# Patient Record
Sex: FEMALE | Race: WHITE | NOT HISPANIC OR LATINO | Employment: FULL TIME | URBAN - METROPOLITAN AREA
[De-identification: names, ages, dates, MRNs, and addresses within clinical notes are randomized per-mention and may not be internally consistent; named-entity substitution may affect disease eponyms.]

---

## 2017-02-01 ENCOUNTER — ALLSCRIPTS OFFICE VISIT (OUTPATIENT)
Dept: OTHER | Facility: OTHER | Age: 52
End: 2017-02-01

## 2017-02-01 LAB
BILIRUB UR QL STRIP: NEGATIVE
CLARITY UR: NORMAL
COLOR UR: YELLOW
GLUCOSE (HISTORICAL): NEGATIVE
HGB UR QL STRIP.AUTO: NEGATIVE
KETONES UR STRIP-MCNC: NORMAL MG/DL
LEUKOCYTE ESTERASE UR QL STRIP: NEGATIVE
NITRITE UR QL STRIP: NEGATIVE
PH UR STRIP.AUTO: 5.5 [PH]
PROT UR STRIP-MCNC: NEGATIVE MG/DL
SP GR UR STRIP.AUTO: 1.01
UROBILINOGEN UR QL STRIP.AUTO: 0.2

## 2017-02-04 LAB — CULTURE RESULT (HISTORICAL): NORMAL

## 2017-02-06 ENCOUNTER — GENERIC CONVERSION - ENCOUNTER (OUTPATIENT)
Dept: OTHER | Facility: OTHER | Age: 52
End: 2017-02-06

## 2017-04-10 ENCOUNTER — ALLSCRIPTS OFFICE VISIT (OUTPATIENT)
Dept: OTHER | Facility: OTHER | Age: 52
End: 2017-04-10

## 2018-01-10 NOTE — RESULT NOTES
Verified Results  (1) CBC/PLT/DIFF [id number] 05:20PM Bobbi Torres Order Number: [id number]     Test Name Result Flag Reference   WBC COUNT 6 20 Thousand/uL  4 31-10 16   RBC COUNT 4 44 Million/uL  3 81-5 12   HEMOGLOBIN 13 4 g/dL  11 5-15 4   HEMATOCRIT 38 9 %  34 8-46  1   MCV 88 fL  82-98   MCH 30 2 pg  26 8-34 3   MCHC 34 4 g/dL  31 4-37 4   RDW 13 7 %  11 6-15 1   MPV 12 0 fL  8 9-12 7   PLATELET COUNT 901 Thousands/uL  149-390   nRBC AUTOMATED 0 /100 WBCs     NEUTROPHILS RELATIVE PERCENT 52 %  43-75   LYMPHOCYTES RELATIVE PERCENT 38 %  14-44   MONOCYTES RELATIVE PERCENT 9 %  4-12   EOSINOPHILS RELATIVE PERCENT 1 %  0-6   BASOPHILS RELATIVE PERCENT 0 %  0-1   NEUTROPHILS ABSOLUTE COUNT 3 23 Thousands/?L  1 85-7 62   LYMPHOCYTES ABSOLUTE COUNT 2 34 Thousands/?L  0 60-4 47   MONOCYTES ABSOLUTE COUNT 0 55 Thousand/?L  0 17-1 22   EOSINOPHILS ABSOLUTE COUNT 0 06 Thousand/?L  0 00-0 61   BASOPHILS ABSOLUTE COUNT 0 02 Thousands/?L  0 00-0 10   - Patient Instructions: This bloodwork is non-fasting  Please drink two glasses of water morning of bloodwork  (1) COMPREHENSIVE METABOLIC PANEL [id number] 37:54CM Bobbi Torres Order Number: [id number]     Test Name Result Flag Reference   GLUCOSE,RANDM 88 mg/dL     If the patient is fasting, the ADA then defines impaired fasting glucose as > 100 mg/dL and diabetes as > or equal to 123 mg/dL     SODIUM 138 mmol/L  136-145   POTASSIUM 3 9 mmol/L  3 5-5 3   CHLORIDE 107 mmol/L  100-108   CARBON DIOXIDE 20 mmol/L L 21-32   ANION GAP (CALC) 11 mmol/L  4-13   BLOOD UREA NITROGEN 13 mg/dL  5-25   CREATININE 0 64 mg/dL  0 60-1 30   Standardized to IDMS reference method   CALCIUM 9 3 mg/dL  8 3-10 1   BILI, TOTAL 1 10 mg/dL H 0 20-1 00   ALK PHOSPHATAS 48 U/L     ALT (SGPT) 20 U/L  12-78   AST(SGOT) 15 U/L  5-45   ALBUMIN 4 1 g/dL  3 5-5 0   TOTAL PROTEIN 7 2 g/dL  6 4-8 2   eGFR Non-African American      >60 0 ml/min/1 73sq m National Kidney Disease Education Program recommendations are as follows:  GFR calculation is accurate only with a steady state creatinine  Chronic Kidney disease less than 60 ml/min/1 73 sq  meters  Kidney failure less than 15 ml/min/1 73 sq  meters

## 2018-01-12 NOTE — RESULT NOTES
Verified Results  (Q) CULTURE, URINE, SPECIAL [id number] 12:00AM Paolo Ayala     Test Name Result Flag Reference   CULTURE, URINE, SPECIAL      CULTURE, URINE, SPECIAL         MICRO NUMBER:      [id number]    TEST STATUS:       FINAL    SPECIMEN SOURCE:   URINE    SPECIMEN QUALITY:  ADEQUATE    RESULT:            No Growth

## 2018-01-13 VITALS
OXYGEN SATURATION: 98 % | DIASTOLIC BLOOD PRESSURE: 62 MMHG | BODY MASS INDEX: 32.09 KG/M2 | RESPIRATION RATE: 16 BRPM | HEART RATE: 79 BPM | TEMPERATURE: 98.1 F | WEIGHT: 178.31 LBS | SYSTOLIC BLOOD PRESSURE: 110 MMHG

## 2018-01-13 VITALS
SYSTOLIC BLOOD PRESSURE: 110 MMHG | TEMPERATURE: 98.8 F | HEART RATE: 67 BPM | BODY MASS INDEX: 32.39 KG/M2 | DIASTOLIC BLOOD PRESSURE: 70 MMHG | WEIGHT: 176 LBS | OXYGEN SATURATION: 99 % | HEIGHT: 62 IN

## 2018-03-08 ENCOUNTER — OFFICE VISIT (OUTPATIENT)
Dept: FAMILY MEDICINE CLINIC | Facility: CLINIC | Age: 53
End: 2018-03-08
Payer: COMMERCIAL

## 2018-03-08 VITALS
HEIGHT: 62 IN | TEMPERATURE: 98 F | WEIGHT: 176.5 LBS | SYSTOLIC BLOOD PRESSURE: 116 MMHG | BODY MASS INDEX: 32.48 KG/M2 | RESPIRATION RATE: 16 BRPM | DIASTOLIC BLOOD PRESSURE: 78 MMHG | OXYGEN SATURATION: 98 % | HEART RATE: 74 BPM

## 2018-03-08 DIAGNOSIS — R10.32 LEFT LOWER QUADRANT PAIN: Primary | ICD-10-CM

## 2018-03-08 DIAGNOSIS — H91.13 PRESBYCUSIS OF BOTH EARS: ICD-10-CM

## 2018-03-08 DIAGNOSIS — J32.9 CHRONIC SINUSITIS, UNSPECIFIED LOCATION: ICD-10-CM

## 2018-03-08 PROBLEM — F43.21 SITUATIONAL DEPRESSION: Status: ACTIVE | Noted: 2017-04-10

## 2018-03-08 PROBLEM — R30.0 DIFFICULT OR PAINFUL URINATION: Status: ACTIVE | Noted: 2017-02-01

## 2018-03-08 PROBLEM — F32.A DEPRESSION: Status: ACTIVE | Noted: 2017-10-25

## 2018-03-08 PROCEDURE — 99214 OFFICE O/P EST MOD 30 MIN: CPT | Performed by: FAMILY MEDICINE

## 2018-03-08 RX ORDER — EPINEPHRINE 0.3 MG/.3ML
INJECTION SUBCUTANEOUS
COMMUNITY
Start: 2014-07-11 | End: 2018-08-15 | Stop reason: SDUPTHER

## 2018-03-08 RX ORDER — SERTRALINE HYDROCHLORIDE 25 MG/1
50 TABLET, FILM COATED ORAL
COMMUNITY
Start: 2011-11-15 | End: 2018-10-25 | Stop reason: SDUPTHER

## 2018-03-08 RX ORDER — LORAZEPAM 0.5 MG/1
0.5 TABLET ORAL AS NEEDED
COMMUNITY
Start: 2015-05-19 | End: 2020-09-02 | Stop reason: SDUPTHER

## 2018-03-08 RX ORDER — TRIAMCINOLONE ACETONIDE 55 UG/1
SPRAY, METERED NASAL
COMMUNITY
Start: 2008-04-17 | End: 2018-03-08 | Stop reason: ALTCHOICE

## 2018-03-08 RX ORDER — MULTIVIT-MIN/IRON/FOLIC ACID/K 18-600-40
CAPSULE ORAL
COMMUNITY
End: 2019-04-29

## 2018-03-08 RX ORDER — SUMATRIPTAN 100 MG/1
1 TABLET, FILM COATED ORAL
COMMUNITY
Start: 2015-05-17 | End: 2018-03-25 | Stop reason: SDUPTHER

## 2018-03-08 RX ORDER — FLUTICASONE PROPIONATE 50 MCG
2 SPRAY, SUSPENSION (ML) NASAL DAILY
COMMUNITY
Start: 2011-10-25 | End: 2018-03-08 | Stop reason: SDUPTHER

## 2018-03-08 RX ORDER — FLUTICASONE PROPIONATE 50 MCG
2 SPRAY, SUSPENSION (ML) NASAL DAILY
Qty: 16 G | Refills: 0 | Status: SHIPPED | OUTPATIENT
Start: 2018-03-08 | End: 2018-03-25 | Stop reason: SDUPTHER

## 2018-03-08 NOTE — PROGRESS NOTES
Assessment/Plan:    No problem-specific Assessment & Plan notes found for this encounter  Diagnoses and all orders for this visit:    Left lower quadrant pain  Comments:  Check CBC and CMP  Check CT abdomen and pelvis with contrast  Orders:  -     CT abdomen pelvis w contrast; Future  -     CBC and differential; Future  -     Comprehensive metabolic panel; Future    Chronic sinusitis, unspecified location  -     fluticasone (FLONASE) 50 mcg/act nasal spray; 2 sprays into each nostril daily    Presbycusis of both ears  Comments:  Mild at this time  Consider hearing evaluation if symptoms get worse    Other orders  -     SUMAtriptan (IMITREX) 100 mg tablet; Take 1 tablet by mouth  -     sertraline (ZOLOFT) 25 mg tablet; Take 50 mg by mouth    -     LORazepam (ATIVAN) 0 5 mg tablet; Take by mouth  -     Discontinue: fluticasone (FLONASE) 50 mcg/act nasal spray; 2 sprays into each nostril daily  -     EPINEPHrine (EPIPEN) 0 3 mg/0 3 mL SOAJ; Inject as directed  -     Cholecalciferol (VITAMIN D) 2000 units CAPS; Take by mouth          Subjective:      Patient ID: Praveen Carroll is a 45 y o  female  Patient presents with 2 complaints  First her primary complaint is abdominal pain  She states that she has had intermittent recurrent left lower quadrant abdominal pain over the past several years  Her most recent bout was last night  She described it as severe at the time and she came close to going to the emergency room  The pain is somewhat improved and almost gone today  She has spoken to her gynecologist in the past about this and had an ultrasound approximately 2 years ago which was negative  She has a remote history of a uterine polyp which was removed during an ablation procedure  She has no change in urine or bowel habit  Her colonoscopy was 4 years ago and was normal except for several benign polyps  Also complains of decreased hearing bilaterally left worse than right    This has been gradually progressive        The following portions of the patient's history were reviewed and updated as appropriate: allergies, current medications, past family history, past medical history, past social history, past surgical history and problem list     Review of Systems   Constitutional: Negative  Respiratory: Negative  Cardiovascular: Negative  Gastrointestinal: Positive for abdominal pain  Genitourinary: Negative  Musculoskeletal: Negative  Psychiatric/Behavioral: Negative  Objective:      /78 (BP Location: Left arm, Patient Position: Sitting, Cuff Size: Large)   Pulse 74   Temp 98 °F (36 7 °C) (Tympanic)   Resp 16   Ht 5' 2 4" (1 585 m)   Wt 80 1 kg (176 lb 8 oz)   LMP 01/15/2018   SpO2 98%   BMI 31 87 kg/m²          Physical Exam   Constitutional: She is oriented to person, place, and time  She appears well-developed and well-nourished  No distress  HENT:   Head: Normocephalic and atraumatic  Eyes: Conjunctivae are normal  Pupils are equal, round, and reactive to light  Right eye exhibits no discharge  Neck: Normal range of motion  No thyromegaly present  Cardiovascular: Normal rate and regular rhythm  Pulmonary/Chest: Effort normal and breath sounds normal  No respiratory distress  Abdominal: Soft  Bowel sounds are normal  There is tenderness  Mild left lower quadrant tenderness with negative rebound negative guarding and negative rigidity   Lymphadenopathy:     She has no cervical adenopathy  Neurological: She is alert and oriented to person, place, and time  Skin: Skin is warm and dry  She is not diaphoretic  Psychiatric: She has a normal mood and affect  Her behavior is normal  Judgment and thought content normal    Nursing note and vitals reviewed

## 2018-03-09 LAB
ALBUMIN SERPL-MCNC: 4.2 G/DL (ref 3.6–5.1)
ALBUMIN/GLOB SERPL: 1.6 (CALC) (ref 1–2.5)
ALP SERPL-CCNC: 44 U/L (ref 33–130)
ALT SERPL-CCNC: 10 U/L (ref 6–29)
AST SERPL-CCNC: 13 U/L (ref 10–35)
BASOPHILS # BLD AUTO: 41 CELLS/UL (ref 0–200)
BASOPHILS NFR BLD AUTO: 0.7 %
BILIRUB SERPL-MCNC: 0.9 MG/DL (ref 0.2–1.2)
BUN SERPL-MCNC: 16 MG/DL (ref 7–25)
BUN/CREAT SERPL: ABNORMAL (CALC) (ref 6–22)
CALCIUM SERPL-MCNC: 9 MG/DL (ref 8.6–10.4)
CHLORIDE SERPL-SCNC: 108 MMOL/L (ref 98–110)
CO2 SERPL-SCNC: 19 MMOL/L (ref 20–31)
CREAT SERPL-MCNC: 0.6 MG/DL (ref 0.5–1.05)
EOSINOPHIL # BLD AUTO: 112 CELLS/UL (ref 15–500)
EOSINOPHIL NFR BLD AUTO: 1.9 %
ERYTHROCYTE [DISTWIDTH] IN BLOOD BY AUTOMATED COUNT: 13 % (ref 11–15)
GLOBULIN SER CALC-MCNC: 2.6 G/DL (CALC) (ref 1.9–3.7)
GLUCOSE SERPL-MCNC: 104 MG/DL (ref 65–99)
HCT VFR BLD AUTO: 38.2 % (ref 35–45)
HGB BLD-MCNC: 12.8 G/DL (ref 11.7–15.5)
LYMPHOCYTES # BLD AUTO: 2159 CELLS/UL (ref 850–3900)
LYMPHOCYTES NFR BLD AUTO: 36.6 %
MCH RBC QN AUTO: 30 PG (ref 27–33)
MCHC RBC AUTO-ENTMCNC: 33.5 G/DL (ref 32–36)
MCV RBC AUTO: 89.5 FL (ref 80–100)
MONOCYTES # BLD AUTO: 537 CELLS/UL (ref 200–950)
MONOCYTES NFR BLD AUTO: 9.1 %
NEUTROPHILS # BLD AUTO: 3050 CELLS/UL (ref 1500–7800)
NEUTROPHILS NFR BLD AUTO: 51.7 %
PLATELET # BLD AUTO: 262 THOUSAND/UL (ref 140–400)
PMV BLD REES-ECKER: 12.8 FL (ref 7.5–12.5)
POTASSIUM SERPL-SCNC: 3.9 MMOL/L (ref 3.5–5.3)
PROT SERPL-MCNC: 6.8 G/DL (ref 6.1–8.1)
RBC # BLD AUTO: 4.27 MILLION/UL (ref 3.8–5.1)
SL AMB EGFR AFRICAN AMERICAN: 120 ML/MIN/1.73M2
SL AMB EGFR NON AFRICAN AMERICAN: 103 ML/MIN/1.73M2
SODIUM SERPL-SCNC: 138 MMOL/L (ref 135–146)
WBC # BLD AUTO: 5.9 THOUSAND/UL (ref 3.8–10.8)

## 2018-03-14 ENCOUNTER — HOSPITAL ENCOUNTER (OUTPATIENT)
Dept: CT IMAGING | Facility: HOSPITAL | Age: 53
Discharge: HOME | End: 2018-03-14
Payer: COMMERCIAL

## 2018-03-14 DIAGNOSIS — R10.32 LEFT LOWER QUADRANT PAIN: ICD-10-CM

## 2018-03-14 PROCEDURE — 74177 CT ABD & PELVIS W/CONTRAST: CPT

## 2018-03-14 RX ADMIN — IOHEXOL 100 ML: 350 INJECTION, SOLUTION INTRAVENOUS at 19:37

## 2018-03-19 ENCOUNTER — TELEPHONE (OUTPATIENT)
Dept: FAMILY MEDICINE CLINIC | Facility: CLINIC | Age: 53
End: 2018-03-19

## 2018-03-19 NOTE — TELEPHONE ENCOUNTER
Pt called inquiring about her CT results pt was informed that they are still in  process and we will call her with her results once the come in

## 2018-03-25 DIAGNOSIS — G43.909 MIGRAINE WITHOUT STATUS MIGRAINOSUS, NOT INTRACTABLE, UNSPECIFIED MIGRAINE TYPE: Primary | ICD-10-CM

## 2018-03-25 DIAGNOSIS — J32.9 CHRONIC SINUSITIS, UNSPECIFIED LOCATION: ICD-10-CM

## 2018-03-26 RX ORDER — SUMATRIPTAN 100 MG/1
TABLET, FILM COATED ORAL
Qty: 27 TABLET | Refills: 1 | Status: SHIPPED | OUTPATIENT
Start: 2018-03-26 | End: 2021-11-26 | Stop reason: SDUPTHER

## 2018-03-26 RX ORDER — FLUTICASONE PROPIONATE 50 MCG
SPRAY, SUSPENSION (ML) NASAL
Qty: 16 G | Refills: 5 | Status: SHIPPED | OUTPATIENT
Start: 2018-03-26 | End: 2019-01-14 | Stop reason: SDUPTHER

## 2018-04-03 ENCOUNTER — OFFICE VISIT (OUTPATIENT)
Dept: FAMILY MEDICINE CLINIC | Facility: CLINIC | Age: 53
End: 2018-04-03
Payer: COMMERCIAL

## 2018-04-03 VITALS
SYSTOLIC BLOOD PRESSURE: 110 MMHG | OXYGEN SATURATION: 98 % | HEIGHT: 62 IN | RESPIRATION RATE: 16 BRPM | BODY MASS INDEX: 32.61 KG/M2 | WEIGHT: 177.2 LBS | DIASTOLIC BLOOD PRESSURE: 64 MMHG | HEART RATE: 74 BPM | TEMPERATURE: 98 F

## 2018-04-03 DIAGNOSIS — R10.9 CHRONIC ABDOMINAL PAIN: Primary | ICD-10-CM

## 2018-04-03 DIAGNOSIS — G89.29 CHRONIC ABDOMINAL PAIN: Primary | ICD-10-CM

## 2018-04-03 PROCEDURE — 99213 OFFICE O/P EST LOW 20 MIN: CPT | Performed by: FAMILY MEDICINE

## 2018-04-04 NOTE — ASSESSMENT & PLAN NOTE
Reviewed past history as well as most recent CT scan  No is evidence of any serious medical pathology  Discussed possibility of irritable bowel syndrome versus ectopic endometriosis  Reassured patient that in either of these 2 cases her health is not at risk and that further workup/diagnostics would be determined by her level of symptoms  At this time she would prefer to just watch her symptoms and call if are getting worse  In that case referral to Gastroenterology may be indicated

## 2018-04-04 NOTE — PROGRESS NOTES
Assessment/Plan:    Chronic abdominal pain  Reviewed past history as well as most recent CT scan  No is evidence of any serious medical pathology  Discussed possibility of irritable bowel syndrome versus ectopic endometriosis  Reassured patient that in either of these 2 cases her health is not at risk and that further workup/diagnostics would be determined by her level of symptoms  At this time she would prefer to just watch her symptoms and call if are getting worse  In that case referral to Gastroenterology may be indicated  Diagnoses and all orders for this visit:    Chronic abdominal pain          Subjective:      Patient ID: Kiko Martinez is a 45 y o  female  Patient return to the office to discuss her chronic abdominal pain and her recent CT scan  The CT scan was unremarkable showed no pathology  Reviewing her past history she has had multiple pelvic ultrasounds and a normal colonoscopy 4 years ago  The symptoms began prior to her colonoscopy  They are intermittent and generally tolerable  Recent episode which prompted her most recent visit was more severe  She has had no symptoms since her last office visit  The following portions of the patient's history were reviewed and updated as appropriate: allergies, current medications, past family history, past medical history, past social history, past surgical history and problem list     Review of Systems   Gastrointestinal: Positive for abdominal pain  Negative for constipation, diarrhea and nausea  Objective:      /64 (BP Location: Left arm, Patient Position: Sitting, Cuff Size: Adult)   Pulse 74   Temp 98 °F (36 7 °C) (Tympanic)   Resp 16   Ht 5' 1 5" (1 562 m)   Wt 80 4 kg (177 lb 3 2 oz)   SpO2 98%   BMI 32 94 kg/m²          Physical Exam   HENT:   Head: Normocephalic and atraumatic  Cardiovascular: Normal rate  Pulmonary/Chest: Effort normal and breath sounds normal    Abdominal: Soft   Bowel sounds are normal    Nursing note and vitals reviewed

## 2018-05-10 ENCOUNTER — TRANSCRIBE ORDERS (OUTPATIENT)
Dept: ADMINISTRATIVE | Facility: HOSPITAL | Age: 53
End: 2018-05-10

## 2018-05-10 DIAGNOSIS — R10.2 ADNEXAL TENDERNESS, RIGHT: Primary | ICD-10-CM

## 2018-05-10 LAB — MISCELLANEOUS LAB TEST RESULT (HISTORICAL): NORMAL

## 2018-05-10 PROCEDURE — 87491 CHLMYD TRACH DNA AMP PROBE: CPT | Performed by: OBSTETRICS & GYNECOLOGY

## 2018-05-10 PROCEDURE — 87591 N.GONORRHOEAE DNA AMP PROB: CPT | Performed by: OBSTETRICS & GYNECOLOGY

## 2018-05-11 LAB
CANDIDA ANTIGEN DETECTION (HISTORICAL): NORMAL
GARDNERELLA VAGINALIS (HISTORICAL): NORMAL
TRICHOMONAS (HISTORICAL): NORMAL

## 2018-05-12 ENCOUNTER — HOSPITAL ENCOUNTER (OUTPATIENT)
Dept: ULTRASOUND IMAGING | Facility: HOSPITAL | Age: 53
Discharge: HOME | End: 2018-05-12
Payer: COMMERCIAL

## 2018-05-12 DIAGNOSIS — R10.2 ADNEXAL TENDERNESS, RIGHT: ICD-10-CM

## 2018-05-12 PROCEDURE — 76830 TRANSVAGINAL US NON-OB: CPT

## 2018-05-12 PROCEDURE — 76856 US EXAM PELVIC COMPLETE: CPT

## 2018-05-15 ENCOUNTER — LAB REQUISITION (OUTPATIENT)
Dept: LAB | Facility: HOSPITAL | Age: 53
End: 2018-05-15
Payer: COMMERCIAL

## 2018-05-15 DIAGNOSIS — N72 INFLAMMATORY DISEASE OF UTERINE CERVIX: ICD-10-CM

## 2018-05-15 LAB — MISCELLANEOUS LAB TEST RESULT (HISTORICAL): NORMAL

## 2018-05-16 LAB
CHLAMYDIA DNA CVX QL NAA+PROBE: NORMAL
N GONORRHOEA DNA GENITAL QL NAA+PROBE: NORMAL

## 2018-08-15 ENCOUNTER — OFFICE VISIT (OUTPATIENT)
Dept: FAMILY MEDICINE CLINIC | Facility: CLINIC | Age: 53
End: 2018-08-15
Payer: COMMERCIAL

## 2018-08-15 ENCOUNTER — TRANSCRIBE ORDERS (OUTPATIENT)
Dept: ADMINISTRATIVE | Facility: HOSPITAL | Age: 53
End: 2018-08-15

## 2018-08-15 ENCOUNTER — APPOINTMENT (OUTPATIENT)
Dept: RADIOLOGY | Facility: MEDICAL CENTER | Age: 53
End: 2018-08-15
Payer: COMMERCIAL

## 2018-08-15 ENCOUNTER — TELEPHONE (OUTPATIENT)
Dept: FAMILY MEDICINE CLINIC | Facility: CLINIC | Age: 53
End: 2018-08-15

## 2018-08-15 VITALS
DIASTOLIC BLOOD PRESSURE: 80 MMHG | BODY MASS INDEX: 32.44 KG/M2 | OXYGEN SATURATION: 99 % | WEIGHT: 176.3 LBS | RESPIRATION RATE: 18 BRPM | HEIGHT: 62 IN | SYSTOLIC BLOOD PRESSURE: 130 MMHG | HEART RATE: 92 BPM | TEMPERATURE: 98.6 F

## 2018-08-15 DIAGNOSIS — T78.2XXD ANAPHYLAXIS, SUBSEQUENT ENCOUNTER: ICD-10-CM
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
## 2018-08-15 DIAGNOSIS — M54.42 LEFT-SIDED LOW BACK PAIN WITH LEFT-SIDED SCIATICA, UNSPECIFIED CHRONICITY: Primary | ICD-10-CM

## 2018-08-15 DIAGNOSIS — M54.42 LEFT-SIDED LOW BACK PAIN WITH LEFT-SIDED SCIATICA, UNSPECIFIED CHRONICITY: ICD-10-CM

## 2018-08-15 PROCEDURE — 72100 X-RAY EXAM L-S SPINE 2/3 VWS: CPT

## 2018-08-15 PROCEDURE — 99214 OFFICE O/P EST MOD 30 MIN: CPT | Performed by: PHYSICIAN ASSISTANT

## 2018-08-15 RX ORDER — CYCLOBENZAPRINE HCL 10 MG
10 TABLET ORAL
Qty: 30 TABLET | Refills: 0 | Status: SHIPPED | OUTPATIENT
Start: 2018-08-15 | End: 2018-10-25

## 2018-08-15 RX ORDER — EPINEPHRINE 0.3 MG/.3ML
0.3 INJECTION SUBCUTANEOUS ONCE
Qty: 2 EACH | Refills: 1 | Status: SHIPPED | OUTPATIENT
Start: 2018-08-15 | End: 2021-10-25

## 2018-08-15 RX ORDER — MELOXICAM 15 MG/1
15 TABLET ORAL DAILY
Qty: 30 TABLET | Refills: 0 | Status: SHIPPED | OUTPATIENT
Start: 2018-08-15 | End: 2018-10-25

## 2018-08-15 NOTE — TELEPHONE ENCOUNTER
Calling the pharmacy to verify all pt's rx's were received  I called and spoke directly to [facility]   gave a verbal for al l3 of pt's rx's and reviewed them as Stefan Emmanuel prescribed  If epi pen is not cover for insurance will  call to ask if can dispense generic  I called and informed pt I gave a verbal for all three of pt's rx's

## 2018-08-15 NOTE — PROGRESS NOTES
Assessment/Plan:      Diagnoses and all orders for this visit:    Left-sided low back pain with left-sided sciatica, unspecified chronicity  -     Cancel: XR spine lumbar 2 or 3 views injury; Future  -     meloxicam (MOBIC) 15 mg tablet; Take 1 tablet (15 mg total) by mouth daily For low back pain  -     Ambulatory referral to Physical Therapy; Future  -     XR spine lumbar 2 or 3 views injury; Future  -     cyclobenzaprine (FLEXERIL) 10 mg tablet; Take 1 tablet (10 mg total) by mouth daily at bedtime To relax back muscles    Fall from one level to another as cause of accidental injury  -     Ambulatory referral to Physical Therapy; Future  -     XR spine lumbar 2 or 3 views injury; Future  -     cyclobenzaprine (FLEXERIL) 10 mg tablet; Take 1 tablet (10 mg total) by mouth daily at bedtime To relax back muscles    Anaphylaxis, subsequent encounter  -     EPINEPHrine (EPIPEN) 0 3 mg/0 3 mL SOAJ; Inject 0 3 mL (0 3 mg total) into a muscle once for 1 dose        20-year-old female here today for new complaint of left-sided lower back pain radiating into her left leg for 3 weeks  She states that she actually fell while vacationing a few weeks ago accidentally falling into a beach chair and through the straps hitting her left lower back  She states that the pain persisted and actually worsened and now for the past 3 days is radiating into her left lower extremity  She has had no prior back issues  Due to injury itself I have advised that we send her for an x-ray of the lumbar spine to rule out acute injury  I will start her on meloxicam and cyclobenzaprine muscle relaxer  She will take meloxicam in the morning with food and cyclobenzaprine at night before bed with side effects discussed of each  I would like her to consider starting PT sooner than later and she will schedule  We will call her with x-ray results when available and follow up with her at that time        EpiPen also refilled at her  Request     Chief Complaint   Patient presents with    lower back pain     patient states that she felt when she was at the beach for vacation back on july the 22th       Subjective:     Patient ID: Shayy Ford is a 52 y o  female     54y/o female here today for left low back pain going into left leg x 2-3 weeks  States pain started when she fell through beach chair hitting her back  Since then pain persists, just got back yesterday from her vacation, had 8 hour car ride  No hx of back issues  Starts in left low back, goes into left lateral hip into left anterior thigh down into toes  Some tingling in toes when sitting a while  She put heating pad on once  Advil intermittent  Shooting pain into leg start 3 days  She also needs epi pen refilled - bee sting anaphylaxis  Review of Systems   Constitutional: Negative  Gastrointestinal: Negative  Genitourinary: Negative  Musculoskeletal:        As in HPI   Skin: Negative  Neurological:        As in HPI         The following portions of the patient's history were reviewed and updated as appropriate: allergies, current medications, past family history, past medical history, past social history, past surgical history and problem list       Objective:     Physical Exam   Constitutional: She is oriented to person, place, and time  She appears well-developed and well-nourished  No distress  Musculoskeletal:     Gait normal     Spine and lower extremities appear normal to inspection without redness, swelling, ecchymosis or rash  There is some tenderness to palpation along the left lower lumbar spine with some hypertonicity into the upper buttock region  No hip or lower extremity tenderness  Full range of motion of hip on left as well as her cervical spine  Forward flexion provokes most of the spinal pain otherwise relatively normal range of motion of lumbar spine in all directions  Strength of lower extremities is intact and symmetric     Neurological: She is alert and oriented to person, place, and time  No sensory deficit  Coordination and gait normal    Reflex Scores:       Patellar reflexes are 1+ on the right side and 1+ on the left side  Skin: Skin is intact  Psychiatric: She has a normal mood and affect  Vitals reviewed        Vitals:    08/15/18 1056   BP: 130/80   BP Location: Left arm   Patient Position: Sitting   Cuff Size: Large   Pulse: 92   Resp: 18   Temp: 98 6 °F (37 °C)   TempSrc: Tympanic   SpO2: 99%   Weight: 80 kg (176 lb 4 8 oz)   Height: 5' 2 21" (1 58 m)

## 2018-08-15 NOTE — TELEPHONE ENCOUNTER
Pt left a message at 2:07 pm on the message line  She was at the pharmacy and her rx 's were not yet received  It looks like they are still transmittign messaged LK

## 2018-08-18 ENCOUNTER — TELEPHONE (OUTPATIENT)
Dept: FAMILY MEDICINE CLINIC | Facility: CLINIC | Age: 53
End: 2018-08-18

## 2018-08-18 NOTE — TELEPHONE ENCOUNTER
Patient called back, Details given about xray  I closed task by mistake  She states that she is still having some pain but it is not as bad with the medication  Patient also states that she starts physical therapy on Wednesday the 22nd

## 2018-08-22 ENCOUNTER — EVALUATION (OUTPATIENT)
Dept: PHYSICAL THERAPY | Facility: CLINIC | Age: 53
End: 2018-08-22
Payer: COMMERCIAL
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
## 2018-08-22 DIAGNOSIS — M54.42 LEFT-SIDED LOW BACK PAIN WITH LEFT-SIDED SCIATICA, UNSPECIFIED CHRONICITY: Primary | ICD-10-CM

## 2018-08-22 PROCEDURE — G8991 OTHER PT/OT GOAL STATUS: HCPCS

## 2018-08-22 PROCEDURE — G8990 OTHER PT/OT CURRENT STATUS: HCPCS

## 2018-08-22 PROCEDURE — 97161 PT EVAL LOW COMPLEX 20 MIN: CPT

## 2018-08-22 NOTE — PROGRESS NOTES
PT Evaluation     Today's date: 2018  Patient name: Flora Sosa  : 1965  MRN: [id number]  Referring provider: Jay Gowers  Dx:   Encounter Diagnosis     ICD-10-CM    1  Left-sided low back pain with left-sided sciatica, unspecified chronicity M54 42 Ambulatory referral to Physical Therapy   2  Fall from one level to another as cause of accidental injury [address] Ambulatory referral to Physical Therapy                  ADDENDUM: Patient called to cancel remaining appointments, stating she is feeling better and would like to continue with HEP  Discharge from PT at this time  -AN, PT 18  Assessment  Impairments: abnormal or restricted ROM, activity intolerance, impaired physical strength, lacks appropriate home exercise program and pain with function  Functional limitations: pain with walking community distances and sitting for long periods of time, difficulty/pain with sleeping  Assessment details: Flora Sosa is a 52 y o  female presenting to PT with pain, radicular symptoms, decreased hip range of motion, decreased strength, and decreased tolerance to activity  Patient would benefit from skilled PT services to address these impairments and to maximize function in order to improve quality of life  Thank you for the referral   Understanding of Dx/Px/POC: good   Prognosis: good    Goals  STG  1) B/L hip ROM will improve 50% in 4 weeks  2) B/L hip strength will improve 1/2 grade in 4 weeks  LTG  1) B/L hip strength will improve to 5/5 in 8 weeks  2) Lumbar and hip ROM will improve to WNL in 8 weeks  3) Patient will not have radicular symptoms in 8 weeks  4) Patient will be independent in HEP within 8 weeks      Plan  Patient would benefit from: skilled physical therapy  Planned modality interventions: TENS and thermotherapy: hydrocollator packs  Planned therapy interventions: abdominal trunk stabilization, home exercise program, flexibility, functional ROM exercises, therapeutic exercise, therapeutic activities, stretching, strengthening, patient education, neuromuscular re-education, manual therapy, joint mobilization and postural training  Frequency: 2x week  Duration in weeks: 8  Treatment plan discussed with: patient        Subjective Evaluation    History of Present Illness  Date of onset: 2018  Mechanism of injury: Patient presents with c/o LBP which began a few weeks ago when she was attempting to sit down into a low beach chair, and missed the chair, landing on the metal part of the seat  Last week she went to [place] (8 hour drive), and when she woke up the following day she had a shooting pain down her LLE, and was unable to bear weight on that side  She has been taking a muscle relaxant at night and an anti-inflammatory medication, which seem to help the pain  This morning she states was the first time she was able to get up out of bed without having to stretch and felt fine walking around right away  She has a desk job, and has a [place], so she alternates between standing and sitting, however since this pain began she has spent less time standing as it is more painful  Quality of life: good    Pain  Current pain ratin  At best pain ratin  At worst pain rating: 10  Quality: needle-like, radiating and dull ache  Relieving factors: medications, change in position, heat and support  Aggravating factors: sitting, standing and walking  Progression: no change      Diagnostic Tests  Abnormal x-ray: DJD L4-L5, L5-S1    Treatments  Current treatment: medication  Patient Goals  Patient goals for therapy: decreased pain, increased motion, return to sport/leisure activities and increased strength          Objective     Special Questions  Negative for bladder dysfunction, bowel dysfunction and saddle (S4) numbness    Postural Observations  Seated posture: fair  Standing posture: fair  Correction of posture: makes symptoms better        Palpation   Left   No palpable tenderness to the erector spinae, lumbar paraspinals and quadratus lumborum  Right   No palpable tenderness to the erector spinae, lumbar paraspinals and quadratus lumborum  Tenderness     Left Hip   No tenderness in the PSIS  Right Hip   No tenderness in the PSIS  Neurological Testing     Additional Neurological Details  Normal LE neuro screen    Active Range of Motion     Lumbar   Normal active range of motion    Additional Active Range of Motion Details  Increased localized discomfort with repeated extension    Strength/Myotome Testing     Left Hip   Planes of Motion   Flexion: 4-  Extension: 4  Abduction: 4-  Adduction: 4    Right Hip   Planes of Motion   Flexion: 4+  Extension: 4  Abduction: 4  Adduction: 4    Left Knee   Flexion: 4+  Extension: 4+    Right Knee   Flexion: 4+  Extension: 4+    Left Ankle/Foot   Dorsiflexion: 4+  Plantar flexion: 4+    Right Ankle/Foot   Dorsiflexion: 4+  Plantar flexion: 4+    Muscle Activation     Additional Muscle Activation Details  Good TA activation    Tests       Thoracic   Positive slump  Lumbar   Positive prone instability  and slumped  Negative repeated flexion and repeated extension  Left   Positive crossed SLR  Negative passive SLR         Flowsheet Rows      Most Recent Value   PT/OT G-Codes   Current Score  52   Projected Score  69   FOTO information reviewed  Yes   Assessment Type  Evaluation   G code set  Other PT/OT Primary   Other PT Primary Current Status ()  CK   Other PT Primary Goal Status ()  CJ        Precautions: Iodine allergy, bee allergy    Daily Treatment Diary       Manuals             B/L piriformis stretch             L hip distraction                                       Exercise Diary              Cardio warmup             LB stretch pball rollout             HS stretch             Piriformis stretch             LTR                          SKTC             PPT             PPT w/ march             Supine ball crush             Glute bridges             Clam shells             Supine SLR                          Mini squats                                                                                                        Modalities             MHP prn

## 2018-08-29 ENCOUNTER — APPOINTMENT (OUTPATIENT)
Dept: PHYSICAL THERAPY | Facility: CLINIC | Age: 53
End: 2018-08-29
Payer: COMMERCIAL

## 2018-08-30 ENCOUNTER — APPOINTMENT (OUTPATIENT)
Dept: PHYSICAL THERAPY | Facility: CLINIC | Age: 53
End: 2018-08-30
Payer: COMMERCIAL

## 2018-09-18 ENCOUNTER — TRANSCRIBE ORDERS (OUTPATIENT)
Dept: ADMINISTRATIVE | Facility: HOSPITAL | Age: 53
End: 2018-09-18

## 2018-09-18 DIAGNOSIS — R10.2 PELVIC PAIN: Primary | ICD-10-CM

## 2018-09-19 ENCOUNTER — HOSPITAL ENCOUNTER (OUTPATIENT)
Dept: ULTRASOUND IMAGING | Facility: HOSPITAL | Age: 53
Discharge: HOME | End: 2018-09-19
Payer: COMMERCIAL

## 2018-09-19 DIAGNOSIS — R10.2 PELVIC PAIN: ICD-10-CM

## 2018-09-19 PROCEDURE — 76856 US EXAM PELVIC COMPLETE: CPT

## 2018-09-19 PROCEDURE — 76830 TRANSVAGINAL US NON-OB: CPT

## 2018-09-20 ENCOUNTER — TELEPHONE (OUTPATIENT)
Dept: GYNECOLOGY | Facility: CLINIC | Age: 53
End: 2018-09-20

## 2018-09-20 DIAGNOSIS — F32.9 REACTIVE DEPRESSION: Primary | ICD-10-CM

## 2018-09-20 RX ORDER — SERTRALINE HYDROCHLORIDE 25 MG/1
25 TABLET, FILM COATED ORAL DAILY
Qty: 90 TABLET | Refills: 0 | Status: SHIPPED | OUTPATIENT
Start: 2018-09-20 | End: 2018-10-25

## 2018-09-20 RX ORDER — SERTRALINE HYDROCHLORIDE 25 MG/1
25 TABLET, FILM COATED ORAL DAILY
Qty: 30 TABLET | Refills: 0 | Status: SHIPPED | OUTPATIENT
Start: 2018-09-20 | End: 2018-10-25

## 2018-09-20 NOTE — TELEPHONE ENCOUNTER
Tory Marshall calling to request a RF of Zoloft  Dr Andreina Valencia was the original prescribing provider  Pt is out of medication and [facility]  I advised pt to also reach out to her PCP in case Dr Cathy Contreras is not comfortable filling this medication

## 2018-10-19 ENCOUNTER — OFFICE VISIT (OUTPATIENT)
Dept: FAMILY MEDICINE CLINIC | Facility: CLINIC | Age: 53
End: 2018-10-19
Payer: COMMERCIAL

## 2018-10-19 VITALS
BODY MASS INDEX: 32.85 KG/M2 | HEIGHT: 62 IN | DIASTOLIC BLOOD PRESSURE: 92 MMHG | RESPIRATION RATE: 17 BRPM | HEART RATE: 73 BPM | WEIGHT: 178.5 LBS | OXYGEN SATURATION: 98 % | TEMPERATURE: 98.8 F | SYSTOLIC BLOOD PRESSURE: 134 MMHG

## 2018-10-19 DIAGNOSIS — T78.40XA ACUTE ALLERGIC REACTION, INITIAL ENCOUNTER: Primary | ICD-10-CM

## 2018-10-19 PROCEDURE — 3008F BODY MASS INDEX DOCD: CPT | Performed by: PHYSICIAN ASSISTANT

## 2018-10-19 PROCEDURE — 96372 THER/PROPH/DIAG INJ SC/IM: CPT | Performed by: PHYSICIAN ASSISTANT

## 2018-10-19 PROCEDURE — 99214 OFFICE O/P EST MOD 30 MIN: CPT | Performed by: PHYSICIAN ASSISTANT

## 2018-10-19 RX ORDER — DIPHENHYDRAMINE HCL 25 MG
25 CAPSULE ORAL EVERY 6 HOURS
COMMUNITY
Start: 2018-10-18 | End: 2019-02-28

## 2018-10-19 RX ORDER — TRIAMCINOLONE ACETONIDE 40 MG/ML
60 INJECTION, SUSPENSION INTRA-ARTICULAR; INTRAMUSCULAR ONCE
Status: COMPLETED | OUTPATIENT
Start: 2018-10-19 | End: 2018-10-19

## 2018-10-19 RX ORDER — PREDNISONE 20 MG/1
TABLET ORAL
Qty: 18 TABLET | Refills: 0 | Status: SHIPPED | OUTPATIENT
Start: 2018-10-19 | End: 2019-02-28

## 2018-10-19 RX ADMIN — TRIAMCINOLONE ACETONIDE 60 MG: 40 INJECTION, SUSPENSION INTRA-ARTICULAR; INTRAMUSCULAR at 17:21

## 2018-10-19 NOTE — PROGRESS NOTES
Assessment/Plan:      Diagnoses and all orders for this visit:    Acute allergic reaction, initial encounter  -     predniSONE 20 mg tablet; Take 3 tabs PO daily x 3 days, then 2 tabs PO daily x 3 days, then 1 tab PO daily x 3 days  -     triamcinolone acetonide (KENALOG-40) 40 mg/mL injection 60 mg; Inject 1 5 mL (60 mg total) into a muscle once     Other orders  -     diphenhydrAMINE (BENADRYL) 25 mg capsule; Take 25 mg by mouth every 6 (six) hours        Patient is a 57-year-old female presenting today for ER follow-up to allergic reaction to the flu vaccine she received through her employer yesterday  She states after receiving immunization, about 30-45 minutes later she started feeling heaviness in her chest, lip swelling and feeling like her throat was closing  She also started with a blotchy rash on bilateral arms  She was given oral Benadryl at [address] then EpiPen but then was taken by ambulance to [facility] for further evaluation when she did not respond  They gave her IV Benadryl which her symptoms slowly reversed  She has been taking oral Benadryl x2 since being discharged yesterday and states that she is starting to feel little tight in her chest and her lip still feel a little swollen  Her exam today is unremarkable, airway patent and oxygen level normal   However with patient's symptoms I will have her complete a 9 day prednisone taper to ensure the reaction   Resolves  Patient was advised to call or follow up with any concerns, ER precautions were discussed should symptoms continue to get worse and is non responsive to the prednisone  She also can continue Benadryl at night as I explained to patient potential side effects of prednisone as it may cause jitteriness or insomnia  She also was advised to take it with food  She will start this tomorrow, intramuscular Kenalog was administered today 60 mg with patient's verbal permission      I discussed case with Dr Kyle Gonzalez As it is difficult to tell what exactly she had the reaction to  She received Fluzone and she states that she has received the flu vaccine in years past as well without any issues  Dr Kiki Shen  Had suggested I consider reaching out to infectious Disease to determine how to assess this reaction and if she can have any other type of flu immunization in the future  I told patient once I hear something back from Infectious Disease I will let her know  Chief Complaint   Patient presents with    Follow-up     Er follow from alergic reaction from flu shot, heavyness in chest, lips and face swell       Subjective:     Patient ID: Omar Ward is a 52 y o  female     54y/o female here today for f/u to ER visit for allergic reaction to flu vaccine  She received fluzone through her employer at [facility] [employer] [facility] 57 and about a half hour later started with chest tightness, then progressed to feeling like someone choking her, lips swelling  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 and at that time started with hives, was given 25mg benadryl and then epi pen  Ambulance took her to ER since not responding  Given IV benadryl  D/c improvement  Had taken benadryl orallly x 2 since yesterday episode  States started feeling like someone choking her at 2pm today  Sick last night vomiting and diarrhea this AM      No nausea currently  Still some diarrhea  She does feel some lip swelling and some chest tightness  She states she had flu vaccine in past and was fine, never had reaction  Review of Systems   Constitutional: Negative  Respiratory:        As in HPI   Gastrointestinal:        As in HPI   Skin:        As in HPI   Neurological: Negative            The following portions of the patient's history were reviewed and updated as appropriate: allergies, current medications, past family history, past medical history, past social history, past surgical history and problem list       Objective:     Physical Exam Constitutional: She is oriented to person, place, and time  She appears well-developed and well-nourished  HENT:   Head: Normocephalic  Mouth/Throat: Oropharynx is clear and moist and mucous membranes are normal  No oral lesions  No uvula swelling  No posterior oropharyngeal edema or posterior oropharyngeal erythema  Airway patent  No tongue swelling   Neck: Trachea normal and phonation normal  Neck supple  Cardiovascular: Normal rate, regular rhythm and normal heart sounds  Pulmonary/Chest: Effort normal and breath sounds normal    Lymphadenopathy:     She has no cervical adenopathy  Neurological: She is alert and oriented to person, place, and time  Skin: Skin is intact  No rash noted  Psychiatric: She has a normal mood and affect  Vitals reviewed        Vitals:    10/19/18 1625   BP: 134/92   BP Location: Left arm   Patient Position: Sitting   Cuff Size: Adult   Pulse: 73   Resp: 17   Temp: 98 8 °F (37 1 °C)   TempSrc: Tympanic   SpO2: 98%   Weight: 81 kg (178 lb 8 oz)   Height: 5' 2" (1 575 m)

## 2018-10-20 ENCOUNTER — TELEPHONE (OUTPATIENT)
Dept: FAMILY MEDICINE CLINIC | Facility: CLINIC | Age: 53
End: 2018-10-20

## 2018-10-20 NOTE — TELEPHONE ENCOUNTER
Pt was here yesterday and put on pred, can she take ibuprofen or imitrex with it for a headache   Please call back at [phone]

## 2018-10-25 ENCOUNTER — OFFICE VISIT (OUTPATIENT)
Dept: GYNECOLOGY | Facility: CLINIC | Age: 53
End: 2018-10-25
Payer: COMMERCIAL

## 2018-10-25 VITALS
WEIGHT: 180 LBS | HEART RATE: 80 BPM | BODY MASS INDEX: 33.13 KG/M2 | SYSTOLIC BLOOD PRESSURE: 120 MMHG | HEIGHT: 62 IN | RESPIRATION RATE: 15 BRPM | DIASTOLIC BLOOD PRESSURE: 84 MMHG

## 2018-10-25 DIAGNOSIS — Z11.51 SCREENING FOR HUMAN PAPILLOMAVIRUS: ICD-10-CM

## 2018-10-25 DIAGNOSIS — N92.6 IRREGULAR PERIODS: ICD-10-CM

## 2018-10-25 DIAGNOSIS — Z12.31 ENCOUNTER FOR SCREENING MAMMOGRAM FOR MALIGNANT NEOPLASM OF BREAST: ICD-10-CM

## 2018-10-25 DIAGNOSIS — F32.9 REACTIVE DEPRESSION: ICD-10-CM

## 2018-10-25 DIAGNOSIS — Z01.419 ENCOUNTER FOR ANNUAL ROUTINE GYNECOLOGICAL EXAMINATION: Primary | ICD-10-CM

## 2018-10-25 PROCEDURE — 99396 PREV VISIT EST AGE 40-64: CPT | Performed by: OBSTETRICS & GYNECOLOGY

## 2018-10-25 PROCEDURE — 87624 HPV HI-RISK TYP POOLED RSLT: CPT | Performed by: OBSTETRICS & GYNECOLOGY

## 2018-10-25 PROCEDURE — G0145 SCR C/V CYTO,THINLAYER,RESCR: HCPCS | Performed by: OBSTETRICS & GYNECOLOGY

## 2018-10-25 RX ORDER — SERTRALINE HYDROCHLORIDE 25 MG/1
25 TABLET, FILM COATED ORAL 2 TIMES DAILY
Qty: 180 TABLET | Refills: 4 | Status: SHIPPED | OUTPATIENT
Start: 2018-10-25 | End: 2019-02-05 | Stop reason: SDUPTHER

## 2018-10-29 ENCOUNTER — TELEPHONE (OUTPATIENT)
Dept: FAMILY MEDICINE CLINIC | Facility: CLINIC | Age: 53
End: 2018-10-29

## 2018-10-29 DIAGNOSIS — Z88.7 ALLERGY TO INFLUENZA VACCINE: Primary | ICD-10-CM

## 2018-10-29 LAB
HPV HR 12 DNA CVX QL NAA+PROBE: POSITIVE
HPV16 DNA CVX QL NAA+PROBE: NEGATIVE
HPV18 DNA CVX QL NAA+PROBE: NEGATIVE

## 2018-10-29 NOTE — TELEPHONE ENCOUNTER
I called and spoke to Yoanna Jameson she was given Sandhya's message  She is feeling much better yesterday was her last days of the steroids so she did finish them  Yes, She will call her family allergist whom her daughter see's  I  Did advise pt I would ask if you can please enter the physician referral  Pt stated the doctor is off of [place] the number is [phone] so we can fax it to their office so they are aware you referred them to their office

## 2018-10-29 NOTE — PROGRESS NOTES
Assessment/Plan:    1  Annual GYN exam    2  Perimenopause -- pt will call if she has any IMB  She will have a f/u u/f and return to the office for review of menstrual calendar in 6 months  3   Mild Stress incontinence -- not bad enough to treat  Diagnoses and all orders for this visit:    Encounter for annual routine gynecological examination  -     Liquid-based pap, screening  -     HPV High Risk; Future  -     HPV High Risk    Screening for human papillomavirus  -     Liquid-based pap, screening  -     HPV High Risk; Future  -     HPV High Risk    Reactive depression  -     sertraline (ZOLOFT) 25 mg tablet; Take 1 tablet (25 mg total) by mouth 2 (two) times a day    Encounter for screening mammogram for malignant neoplasm of breast  -     Mammo screening bilateral w 3d & cad; Future    Irregular periods  -     US pelvis complete w transvaginal; Future          Subjective:      Patient ID: Jessika Ivan is a 52 y o  female  The patient is a 52-year-old who presents for an annual exam   She brought in her menstrual calendar for review  In the past year her periods have varied from 28-89 days in length  They are averaging around 45 to 50 days  The periods are light  She had an ultrasound of the pelvis last month which showed an endometrial thickness of 3 mm  She reports that she has night sweats that occur just before her period  She has no bleeding in between the cycles  The night sweats are tolerable  We discussed hormone replacement in detail  If her symptoms become worse, she will consider this  She denies any breast problems  She has some mild stress incontinence when she does jumping jacks  She does not feel these are bad enough to treat  She does not wear a pad  The patient denies any vaginal dryness or dyspareunia  The patient has been on Zoloft and would like to continue  She has been taking 25 mg b i d           The following portions of the patient's history were reviewed and updated as appropriate: allergies, current medications, past family history, past medical history, past social history, past surgical history and problem list     Review of Systems   Constitutional: Negative  Respiratory: Negative for shortness of breath  Cardiovascular: Negative for chest pain  Gastrointestinal: Negative  Genitourinary: Negative  Skin: Negative  Psychiatric/Behavioral: Negative for agitation, behavioral problems and confusion  Objective:      /84 (Cuff Size: Adult)   Pulse 80   Resp 15   Ht 5' 2" (1 575 m)   Wt 81 6 kg (180 lb)   LMP 09/28/2018 Comment: irregular  BMI 32 92 kg/m²          Physical Exam   Constitutional: She appears well-developed and well-nourished  No distress  HENT:   Head: Normocephalic  Pulmonary/Chest: Effort normal  No respiratory distress  Abdominal: She exhibits no distension and no mass  There is no tenderness  There is no rebound and no guarding  Genitourinary: Rectum normal and uterus normal  No breast swelling, tenderness, discharge or bleeding  No labial fusion  There is no rash, tenderness, lesion or injury on the right labia  There is no rash, tenderness, lesion or injury on the left labia  Uterus is not deviated, not enlarged, not fixed and not tender  Cervix exhibits no motion tenderness, no discharge and no friability  Right adnexum displays no mass, no tenderness and no fullness  Left adnexum displays no mass, no tenderness and no fullness  No erythema, tenderness or bleeding in the vagina  No foreign body in the vagina  No signs of injury around the vagina  No vaginal discharge found  Lymphadenopathy:        Right axillary: No pectoral and no lateral adenopathy present  Left axillary: No pectoral and no lateral adenopathy present  Skin: Skin is warm, dry and intact  She is not diaphoretic  No cyanosis  Nails show no clubbing  Psychiatric: She has a normal mood and affect   Her speech is normal and behavior is normal

## 2018-10-30 NOTE — TELEPHONE ENCOUNTER
I did fax [facility] doctor referral to allergist  Michael Kidd  Today 10/30/2018 at 8:55am to their number [phone]

## 2018-10-31 ENCOUNTER — TELEPHONE (OUTPATIENT)
Dept: FAMILY MEDICINE CLINIC | Facility: CLINIC | Age: 53
End: 2018-10-31

## 2018-10-31 NOTE — TELEPHONE ENCOUNTER
Pt called into the office, and stated that She had reaction to the flu shot, and today she came home having flu sxs with vomiting, and migraine headache pt did state its 2 weeks since she had the flu shot  Please advise  Thank you!

## 2018-10-31 NOTE — TELEPHONE ENCOUNTER
Vomiting is not a flu-like symptom  Is she having a high fever above 101? Severe body aches, fatigue? Runny nose, sneezing, sore throat, cough? If she has concerns she should be evaluated

## 2018-11-01 LAB
LAB AP GYN PRIMARY INTERPRETATION: NORMAL
Lab: NORMAL

## 2018-11-01 NOTE — TELEPHONE ENCOUNTER
I did offer her an appt, that was my first advice with her, she stated she didn't wanted to be seen

## 2018-11-01 NOTE — TELEPHONE ENCOUNTER
Called pt chavo, asking her if she had any other sxs besides her migraine headaches, and vomiting  Thank you!

## 2018-11-01 NOTE — TELEPHONE ENCOUNTER
Pt  called back here symptoms are the following:  Fever 99 9 , vomiting , nausea, sore throat, runny nose, congested

## 2018-11-01 NOTE — TELEPHONE ENCOUNTER
Spoke with pt  Sounds viral  Discussed her questions and concerns  Will see how she does over next few days with rest, fluids, BRAT, antihistamine, mucinex  I told her to f/u with me in office or call with concerns or persistent sxs

## 2018-11-01 NOTE — TELEPHONE ENCOUNTER
Ok, difficult to tell with sxs if it is viral, bacterial, strep, etc? If pt would like, please offer her an appt for evaluation if pt concerned  Typically, influenza is a very high fever 102-103, severe body aches and fatigue too   Nausea/vomiting not typically sxs of influenza

## 2019-01-14 DIAGNOSIS — J32.9 CHRONIC SINUSITIS, UNSPECIFIED LOCATION: ICD-10-CM

## 2019-01-15 RX ORDER — FLUTICASONE PROPIONATE 50 MCG
SPRAY, SUSPENSION (ML) NASAL
Qty: 16 G | Refills: 5 | Status: SHIPPED | OUTPATIENT
Start: 2019-01-15 | End: 2021-01-19

## 2019-02-04 DIAGNOSIS — F32.9 REACTIVE DEPRESSION: ICD-10-CM

## 2019-02-04 RX ORDER — SERTRALINE HYDROCHLORIDE 25 MG/1
25 TABLET, FILM COATED ORAL 2 TIMES DAILY
Qty: 180 TABLET | Refills: 0 | Status: CANCELLED | OUTPATIENT
Start: 2019-02-04

## 2019-02-04 NOTE — TELEPHONE ENCOUNTER
Patient called and requested a medication refill for sertraline (ZOLOFT) 25 mg tablet-take 1 tablet (25 mg) by mouth 2 times a day #180    She would like this called into [facility]

## 2019-02-05 NOTE — TELEPHONE ENCOUNTER
From: Gabe Pedro  Sent: 2/4/2019 8:47 PM EST  Subject: Medication Renewal Request    Manisha Velazquez would like a refill of the following medications:     sertraline (ZOLOFT) 25 mg tablet Delia Esposito MD]    [facility]    Comment:

## 2019-02-06 RX ORDER — SERTRALINE HYDROCHLORIDE 25 MG/1
25 TABLET, FILM COATED ORAL 2 TIMES DAILY
Qty: 180 TABLET | Refills: 4 | Status: SHIPPED | OUTPATIENT
Start: 2019-02-06 | End: 2020-08-24 | Stop reason: SDUPTHER

## 2019-02-06 RX ORDER — SERTRALINE HYDROCHLORIDE 25 MG/1
25 TABLET, FILM COATED ORAL 2 TIMES DAILY
Qty: 180 TABLET | Refills: 4 | Status: SHIPPED | OUTPATIENT
Start: 2019-02-06 | End: 2019-02-28

## 2019-02-28 ENCOUNTER — OFFICE VISIT (OUTPATIENT)
Dept: FAMILY MEDICINE CLINIC | Facility: CLINIC | Age: 54
End: 2019-02-28
Payer: COMMERCIAL

## 2019-02-28 VITALS
SYSTOLIC BLOOD PRESSURE: 120 MMHG | BODY MASS INDEX: 33.86 KG/M2 | DIASTOLIC BLOOD PRESSURE: 80 MMHG | TEMPERATURE: 98.3 F | OXYGEN SATURATION: 98 % | HEIGHT: 62 IN | HEART RATE: 72 BPM | RESPIRATION RATE: 16 BRPM | WEIGHT: 184 LBS

## 2019-02-28 DIAGNOSIS — J20.9 ACUTE BRONCHITIS, UNSPECIFIED ORGANISM: ICD-10-CM

## 2019-02-28 DIAGNOSIS — J01.90 ACUTE NON-RECURRENT SINUSITIS, UNSPECIFIED LOCATION: Primary | ICD-10-CM

## 2019-02-28 DIAGNOSIS — R63.5 WEIGHT GAIN: ICD-10-CM

## 2019-02-28 DIAGNOSIS — E55.9 VITAMIN D DEFICIENCY: ICD-10-CM

## 2019-02-28 DIAGNOSIS — E66.9 OBESITY (BMI 30.0-34.9): ICD-10-CM

## 2019-02-28 DIAGNOSIS — R53.83 FATIGUE, UNSPECIFIED TYPE: ICD-10-CM

## 2019-02-28 PROBLEM — R30.0 DIFFICULT OR PAINFUL URINATION: Status: RESOLVED | Noted: 2017-02-01 | Resolved: 2019-02-28

## 2019-02-28 PROBLEM — F43.21 SITUATIONAL DEPRESSION: Status: RESOLVED | Noted: 2017-04-10 | Resolved: 2019-02-28

## 2019-02-28 PROCEDURE — 3008F BODY MASS INDEX DOCD: CPT | Performed by: PHYSICIAN ASSISTANT

## 2019-02-28 PROCEDURE — 1036F TOBACCO NON-USER: CPT | Performed by: PHYSICIAN ASSISTANT

## 2019-02-28 PROCEDURE — 99214 OFFICE O/P EST MOD 30 MIN: CPT | Performed by: PHYSICIAN ASSISTANT

## 2019-02-28 RX ORDER — LEVOFLOXACIN 500 MG/1
500 TABLET, FILM COATED ORAL EVERY 24 HOURS
Qty: 10 TABLET | Refills: 0 | Status: SHIPPED | OUTPATIENT
Start: 2019-02-28 | End: 2019-03-10

## 2019-02-28 NOTE — PROGRESS NOTES
Assessment/Plan:      Diagnoses and all orders for this visit:    Acute non-recurrent sinusitis, unspecified location  -     levofloxacin (LEVAQUIN) 500 mg tablet; Take 1 tablet (500 mg total) by mouth every 24 hours for 10 days    Acute bronchitis, unspecified organism  -     levofloxacin (LEVAQUIN) 500 mg tablet; Take 1 tablet (500 mg total) by mouth every 24 hours for 10 days    Fatigue, unspecified type  -     CBC and differential  -     Comprehensive metabolic panel  -     TSH, 3rd generation with Free T4 reflex  -     Vitamin D 25 hydroxy  -     Vitamin B12  -     Lyme Antibody Profile with reflex to WB  -     Demetrius Barr Virus Antibody Panel    Vitamin D deficiency  -     CBC and differential  -     Comprehensive metabolic panel  -     TSH, 3rd generation with Free T4 reflex  -     Vitamin D 25 hydroxy  -     Vitamin B12  -     Lyme Antibody Profile with reflex to WB  -     Demetrius Barr Virus Antibody Panel    Obesity (BMI 30 0-34 9)  -     CBC and differential  -     Comprehensive metabolic panel  -     TSH, 3rd generation with Free T4 reflex  -     Vitamin D 25 hydroxy  -     Vitamin B12  -     Lyme Antibody Profile with reflex to WB  -     Demetrius Barr Virus Antibody Panel    Weight gain  -     CBC and differential  -     Comprehensive metabolic panel  -     TSH, 3rd generation with Free T4 reflex      patient is a 57-year-old female presenting today for persistent respiratory symptoms as described in HPI  I will treat her with a course of Levaquin once a day  She will continue Flonase and Mucinex  She declines an inhaler at this time  Rest and hydration, salt water gargles and humidification or steam advised  Patient also discussed today that she is still not feeling well since she had the flu vaccine reaction back in October  She states that she has been feeling off, extremely fatigued, at time sleeping more than what she would as well as weight gain and sluggish    We will check blood work today including CBC, CMP and thyroid as well as vitamin-D, vitamin B12, Lyme and Demetrius-Rojo panel  We will call her with this result  Healthy diet, hydration with at least 60 oz of water a day as well as multivitamin, vitamin-D and vitamin-B complex were all recommended  We will determine if any further evaluation or treatment is necessary based on test results  Chief Complaint   Patient presents with    Cold Like Symptoms     congested,cough x 12 days/Pt wants to do BW       Subjective:     Patient ID: Tariq Sumner is a 52 y o  female     52y/o female here today for acute URI sxs past 10 days  States she hasnt been feeling well since her flu vaccine and reaction back in October  She states she is sleeping more and drained during the day  She currently reports dry cough, laryngitis, PND, chest congestion, slight SOB with stairs  She has been taking mucinex  No fevers, no night sweats  No abd pain, no N/V  Review of Systems   Constitutional: Positive for activity change, chills and fatigue  Negative for fever  HENT: Negative for congestion, ear pain, sinus pressure and sore throat  Respiratory:        As in HPI   Cardiovascular: Negative  Gastrointestinal: Negative  Genitourinary: Negative  Neurological: Negative for dizziness, light-headedness and headaches  Psychiatric/Behavioral: Negative  The following portions of the patient's history were reviewed and updated as appropriate: allergies, current medications, past family history, past medical history, past social history, past surgical history and problem list       Objective:     Physical Exam   Constitutional: She is oriented to person, place, and time  She appears well-developed  No distress  Appearing mildly ill   HENT:   Head: Normocephalic  Right Ear: Tympanic membrane and ear canal normal    Left Ear: Tympanic membrane and ear canal normal    Nose: Mucosal edema present     Mouth/Throat: Oropharynx is clear and moist    Minimal PND   Neck: Neck supple  Cardiovascular: Normal rate, regular rhythm, normal heart sounds and normal pulses  No LE edema   Pulmonary/Chest: Effort normal and breath sounds normal    Intermittent dry cough throughout appt   Lymphadenopathy:     She has no cervical adenopathy  Neurological: She is alert and oriented to person, place, and time  Psychiatric: She has a normal mood and affect  Her speech is normal and behavior is normal    Vitals reviewed        Vitals:    02/28/19 1422   BP: 120/80   BP Location: Left arm   Patient Position: Sitting   Cuff Size: Adult   Pulse: 72   Resp: 16   Temp: 98 3 °F (36 8 °C)   TempSrc: Tympanic   SpO2: 98%   Weight: 83 5 kg (184 lb)   Height: 5' 2 21" (1 58 m)

## 2019-03-01 LAB
25(OH)D3 SERPL-MCNC: 22 NG/ML (ref 30–100)
ALBUMIN SERPL-MCNC: 4.3 G/DL (ref 3.6–5.1)
ALBUMIN/GLOB SERPL: 1.7 (CALC) (ref 1–2.5)
ALP SERPL-CCNC: 52 U/L (ref 33–130)
ALT SERPL-CCNC: 15 U/L (ref 6–29)
AST SERPL-CCNC: 16 U/L (ref 10–35)
B BURGDOR AB SER IA-ACNC: <0.9 INDEX
BASOPHILS # BLD AUTO: 41 CELLS/UL (ref 0–200)
BASOPHILS NFR BLD AUTO: 0.6 %
BILIRUB SERPL-MCNC: 0.6 MG/DL (ref 0.2–1.2)
BUN SERPL-MCNC: 13 MG/DL (ref 7–25)
BUN/CREAT SERPL: NORMAL (CALC) (ref 6–22)
CALCIUM SERPL-MCNC: 9.5 MG/DL (ref 8.6–10.4)
CHLORIDE SERPL-SCNC: 104 MMOL/L (ref 98–110)
CO2 SERPL-SCNC: 28 MMOL/L (ref 20–32)
CREAT SERPL-MCNC: 0.75 MG/DL (ref 0.5–1.05)
EBV NA IGG SER IA-ACNC: 184 U/ML
EBV VCA IGG SER IA-ACNC: 23.7 U/ML
EBV VCA IGM SER IA-ACNC: <36 U/ML
EOSINOPHIL # BLD AUTO: 69 CELLS/UL (ref 15–500)
EOSINOPHIL NFR BLD AUTO: 1 %
ERYTHROCYTE [DISTWIDTH] IN BLOOD BY AUTOMATED COUNT: 12.6 % (ref 11–15)
GLOBULIN SER CALC-MCNC: 2.6 G/DL (CALC) (ref 1.9–3.7)
GLUCOSE SERPL-MCNC: 88 MG/DL (ref 65–99)
HCT VFR BLD AUTO: 39.1 % (ref 35–45)
HGB BLD-MCNC: 13 G/DL (ref 11.7–15.5)
LYMPHOCYTES # BLD AUTO: 2056 CELLS/UL (ref 850–3900)
LYMPHOCYTES NFR BLD AUTO: 29.8 %
MCH RBC QN AUTO: 30.2 PG (ref 27–33)
MCHC RBC AUTO-ENTMCNC: 33.2 G/DL (ref 32–36)
MCV RBC AUTO: 90.7 FL (ref 80–100)
MONOCYTES # BLD AUTO: 607 CELLS/UL (ref 200–950)
MONOCYTES NFR BLD AUTO: 8.8 %
NEUTROPHILS # BLD AUTO: 4126 CELLS/UL (ref 1500–7800)
NEUTROPHILS NFR BLD AUTO: 59.8 %
PLATELET # BLD AUTO: 301 THOUSAND/UL (ref 140–400)
PMV BLD REES-ECKER: 11.9 FL (ref 7.5–12.5)
POTASSIUM SERPL-SCNC: 5 MMOL/L (ref 3.5–5.3)
PROT SERPL-MCNC: 6.9 G/DL (ref 6.1–8.1)
RBC # BLD AUTO: 4.31 MILLION/UL (ref 3.8–5.1)
SL AMB EGFR AFRICAN AMERICAN: 104 ML/MIN/1.73M2
SL AMB EGFR NON AFRICAN AMERICAN: 90 ML/MIN/1.73M2
SODIUM SERPL-SCNC: 139 MMOL/L (ref 135–146)
TSH SERPL-ACNC: 2.66 MIU/L
VIT B12 SERPL-MCNC: 372 PG/ML (ref 200–1100)
WBC # BLD AUTO: 6.9 THOUSAND/UL (ref 3.8–10.8)

## 2019-03-07 ENCOUNTER — TELEPHONE (OUTPATIENT)
Dept: FAMILY MEDICINE CLINIC | Facility: CLINIC | Age: 54
End: 2019-03-07

## 2019-03-07 NOTE — TELEPHONE ENCOUNTER
Pt called office and details and recommendations were given  Pt states she already has been taking a Vitamin D 2000 units and is uncertain why it's low  Pt states she will start a multi vitamin but would like to know if you want her to increase her Vitamin D intake  Pt states we are able to leave detailed messages on her cellphone if she doesn't answer

## 2019-03-07 NOTE — TELEPHONE ENCOUNTER
----- Message from Janet Rouse PA-C sent at 3/6/2019  1:25 PM EST -----  Please call patient and let her know that overall her blood work looked good  Possibly an exposure to mono virus in the past but no active disease  No Lyme disease no anemia  The only abnormal was that her vitamin-D was little low at 22 and goal should be above 30 this could certainly explain some of the fatigue  I would recommend she start 2000 units of vitamin-D a day in addition to a daily multivitamin

## 2019-03-14 ENCOUNTER — HOSPITAL ENCOUNTER (OUTPATIENT)
Dept: ULTRASOUND IMAGING | Facility: HOSPITAL | Age: 54
Discharge: HOME | End: 2019-03-14
Payer: COMMERCIAL

## 2019-03-14 DIAGNOSIS — N92.6 IRREGULAR PERIODS: ICD-10-CM

## 2019-03-14 PROCEDURE — 76830 TRANSVAGINAL US NON-OB: CPT

## 2019-03-14 PROCEDURE — 76856 US EXAM PELVIC COMPLETE: CPT

## 2019-03-20 ENCOUNTER — TELEPHONE (OUTPATIENT)
Dept: GYNECOLOGY | Facility: CLINIC | Age: 54
End: 2019-03-20

## 2019-03-20 NOTE — TELEPHONE ENCOUNTER
----- Message from Keon Abbott MD sent at 3/20/2019  3:06 PM EDT -----  Ask patient to come in to discuss her u/s results  Reassure her that there is nothing bad here, but I need to see her menstrual calendar before I can comment on the u/s

## 2019-03-27 ENCOUNTER — ANNUAL EXAM (OUTPATIENT)
Dept: GYNECOLOGY | Facility: CLINIC | Age: 54
End: 2019-03-27
Payer: COMMERCIAL

## 2019-03-27 VITALS
BODY MASS INDEX: 33.38 KG/M2 | RESPIRATION RATE: 17 BRPM | TEMPERATURE: 98.8 F | HEART RATE: 68 BPM | HEIGHT: 62 IN | WEIGHT: 181.4 LBS

## 2019-03-27 DIAGNOSIS — R10.2 PELVIC PAIN: Primary | ICD-10-CM

## 2019-03-27 DIAGNOSIS — R93.5 ABNORMAL ULTRASOUND OF UTERUS: ICD-10-CM

## 2019-03-27 PROCEDURE — 99213 OFFICE O/P EST LOW 20 MIN: CPT | Performed by: OBSTETRICS & GYNECOLOGY

## 2019-03-27 RX ORDER — LEVOCETIRIZINE DIHYDROCHLORIDE 5 MG/1
5 TABLET, FILM COATED ORAL EVERY EVENING
COMMUNITY

## 2019-03-27 NOTE — PROGRESS NOTES
Assessment/Plan:       Diagnoses and all orders for this visit:    Pelvic pain    Abnormal ultrasound of uterus    Other orders  -     levocetirizine (XYZAL) 5 MG tablet; Take 5 mg by mouth every evening          Subjective:      Patient ID: Praveen Carroll is a 52 y o  female  The patient is a 17-year-old who was seen 6 months ago for an annual exam   At that time she reported that her periods were irregular averaging about 45 days apart  The longest she has ever been without a period is 3 months  Blood work at that time showed that she was not premenopausal   She had an ultrasound of the pelvis which was normal except for the presence of a small fibroid  Her endometrium measured 3 mm  Since then the patient has had no vaginal bleeding at all  She is, however, having a lot of pelvic cramping, as if she was going to have a period  Her ultrasound now shows an ill-defined endometrium, small fibroids, and adenomyosis  The patient has had an endometrial ablation, which could explain the ill-defined endometrium, but this is a definite change from 6 months ago  She states she is having cramping constantly  It is severe enough that she needs to sleep with a heating pad  She had a colonoscopy last week and this was normal except for the presence of 3 polyps  She is having an occasional night sweat, but certainly not every day  There is a family history of esophageal cancer and breast cancer that metastasized to the ovaries  There is no family history of colon or endometrial cancer  The patient is concerned about the possibility of endometrial cancer developing get some time, but not being picked up in the early stages because of her ablation  She is questioning the possibility of a hysterectomy  The patient is aware that I do not do surgery, but will refer her to Dr Betsy Rivera for 2nd opinion  She is familiar with him, as he delivered her last child        The following portions of the patient's history were reviewed and updated as appropriate: allergies, current medications, past family history, past medical history, past social history, past surgical history and problem list     Review of Systems   Constitutional: Negative  Respiratory: Negative for shortness of breath  Cardiovascular: Negative for chest pain  Genitourinary: Positive for pelvic pain  Skin: Negative  Psychiatric/Behavioral: Negative for agitation, behavioral problems and confusion  Objective:      Pulse 68   Temp 98 8 °F (37 1 °C) (Tympanic)   Resp 17   Ht 5' 1 5" (1 562 m)   Wt 82 3 kg (181 lb 6 4 oz)   BMI 33 72 kg/m²          Physical Exam   Constitutional: She appears well-developed and well-nourished  No distress  HENT:   Head: Normocephalic  Eyes: No scleral icterus  Neck: Normal range of motion  Pulmonary/Chest: Effort normal    Skin: Skin is warm  No rash noted  She is not diaphoretic  No erythema  No pallor  Psychiatric: She has a normal mood and affect   Her behavior is normal  Judgment and thought content normal

## 2019-03-29 ENCOUNTER — TELEPHONE (OUTPATIENT)
Dept: GYNECOLOGY | Facility: CLINIC | Age: 54
End: 2019-03-29

## 2019-03-29 NOTE — TELEPHONE ENCOUNTER
----- Message from Manohar Lopez MD sent at 3/20/2019  3:06 PM EDT -----  Ask patient to come in to discuss her u/s results  Reassure her that there is nothing bad here, but I need to see her menstrual calendar before I can comment on the u/s

## 2019-03-29 NOTE — TELEPHONE ENCOUNTER
Zaid Edwards called because she has appt with Dr Becca Wakefield and they the to see the hormone bw that she done in may 2018  Zaid Edwards states that she can no longer see them in my chart so she thought that the office could not see them I let her know that they can be seen by dr Marychuy Garcia office

## 2019-04-04 ENCOUNTER — CONSULT (OUTPATIENT)
Dept: GYNECOLOGY | Facility: CLINIC | Age: 54
End: 2019-04-04
Payer: COMMERCIAL

## 2019-04-04 DIAGNOSIS — N80.0 ADENOMYOSIS: Primary | ICD-10-CM

## 2019-04-04 DIAGNOSIS — R10.2 PELVIC PAIN: ICD-10-CM

## 2019-04-04 DIAGNOSIS — N93.9 ABNORMAL UTERINE BLEEDING (AUB): ICD-10-CM

## 2019-04-04 PROCEDURE — 99215 OFFICE O/P EST HI 40 MIN: CPT | Performed by: OBSTETRICS & GYNECOLOGY

## 2019-04-24 PROBLEM — N80.0 ADENOMYOSIS: Status: ACTIVE | Noted: 2019-04-24

## 2019-04-24 PROBLEM — N80.03 ADENOMYOSIS: Status: ACTIVE | Noted: 2019-04-24

## 2019-04-29 RX ORDER — IBUPROFEN 200 MG
400 TABLET ORAL EVERY 6 HOURS PRN
COMMUNITY
End: 2019-05-13 | Stop reason: HOSPADM

## 2019-04-30 ENCOUNTER — APPOINTMENT (OUTPATIENT)
Dept: LAB | Facility: HOSPITAL | Age: 54
End: 2019-04-30
Attending: OBSTETRICS & GYNECOLOGY
Payer: COMMERCIAL

## 2019-04-30 ENCOUNTER — TRANSCRIBE ORDERS (OUTPATIENT)
Dept: ADMINISTRATIVE | Facility: HOSPITAL | Age: 54
End: 2019-04-30

## 2019-04-30 DIAGNOSIS — N80.0 ADENOMYOSIS: ICD-10-CM

## 2019-04-30 LAB
ABO GROUP BLD: NORMAL
BASOPHILS # BLD AUTO: 0.04 THOUSANDS/ΜL (ref 0–0.1)
BASOPHILS NFR BLD AUTO: 1 % (ref 0–1)
BLD GP AB SCN SERPL QL: NEGATIVE
EOSINOPHIL # BLD AUTO: 0.1 THOUSAND/ΜL (ref 0–0.61)
EOSINOPHIL NFR BLD AUTO: 2 % (ref 0–6)
ERYTHROCYTE [DISTWIDTH] IN BLOOD BY AUTOMATED COUNT: 13.2 % (ref 11.6–15.1)
EST. AVERAGE GLUCOSE BLD GHB EST-MCNC: 111 MG/DL
HBA1C MFR BLD: 5.5 % (ref 4.2–6.3)
HCT VFR BLD AUTO: 41.8 % (ref 34.8–46.1)
HGB BLD-MCNC: 13.6 G/DL (ref 11.5–15.4)
IMM GRANULOCYTES # BLD AUTO: 0.01 THOUSAND/UL (ref 0–0.2)
IMM GRANULOCYTES NFR BLD AUTO: 0 % (ref 0–2)
LYMPHOCYTES # BLD AUTO: 1.94 THOUSANDS/ΜL (ref 0.6–4.47)
LYMPHOCYTES NFR BLD AUTO: 35 % (ref 14–44)
MCH RBC QN AUTO: 30 PG (ref 26.8–34.3)
MCHC RBC AUTO-ENTMCNC: 32.5 G/DL (ref 31.4–37.4)
MCV RBC AUTO: 92 FL (ref 82–98)
MONOCYTES # BLD AUTO: 0.45 THOUSAND/ΜL (ref 0.17–1.22)
MONOCYTES NFR BLD AUTO: 8 % (ref 4–12)
NEUTROPHILS # BLD AUTO: 2.94 THOUSANDS/ΜL (ref 1.85–7.62)
NEUTS SEG NFR BLD AUTO: 54 % (ref 43–75)
NRBC BLD AUTO-RTO: 0 /100 WBCS
PLATELET # BLD AUTO: 240 THOUSANDS/UL (ref 149–390)
PMV BLD AUTO: 11.6 FL (ref 8.9–12.7)
RBC # BLD AUTO: 4.53 MILLION/UL (ref 3.81–5.12)
RH BLD: NEGATIVE
SPECIMEN EXPIRATION DATE: NORMAL
WBC # BLD AUTO: 5.48 THOUSAND/UL (ref 4.31–10.16)

## 2019-04-30 PROCEDURE — 86900 BLOOD TYPING SEROLOGIC ABO: CPT

## 2019-04-30 PROCEDURE — 86850 RBC ANTIBODY SCREEN: CPT

## 2019-04-30 PROCEDURE — 36415 COLL VENOUS BLD VENIPUNCTURE: CPT

## 2019-04-30 PROCEDURE — 83036 HEMOGLOBIN GLYCOSYLATED A1C: CPT

## 2019-04-30 PROCEDURE — 86901 BLOOD TYPING SEROLOGIC RH(D): CPT

## 2019-04-30 PROCEDURE — 85025 COMPLETE CBC W/AUTO DIFF WBC: CPT

## 2019-05-01 ENCOUNTER — PREP FOR PROCEDURE (OUTPATIENT)
Dept: GYNECOLOGY | Facility: CLINIC | Age: 54
End: 2019-05-01

## 2019-05-01 RX ORDER — CEFAZOLIN SODIUM 1 G/50ML
1000 SOLUTION INTRAVENOUS ONCE
Status: CANCELLED | OUTPATIENT
Start: 2019-05-01 | End: 2019-05-01

## 2019-05-08 PROCEDURE — NC001 PR NO CHARGE: Performed by: OBSTETRICS & GYNECOLOGY

## 2019-05-10 ENCOUNTER — ANESTHESIA EVENT (OUTPATIENT)
Dept: PERIOP | Facility: HOSPITAL | Age: 54
End: 2019-05-10
Payer: COMMERCIAL

## 2019-05-12 ENCOUNTER — TELEPHONE (OUTPATIENT)
Dept: OTHER | Facility: OTHER | Age: 54
End: 2019-05-12

## 2019-05-13 ENCOUNTER — ANESTHESIA (OUTPATIENT)
Dept: PERIOP | Facility: HOSPITAL | Age: 54
End: 2019-05-13
Payer: COMMERCIAL

## 2019-05-13 ENCOUNTER — HOSPITAL ENCOUNTER (OUTPATIENT)
Facility: HOSPITAL | Age: 54
Setting detail: OUTPATIENT SURGERY
Discharge: HOME | End: 2019-05-13
Attending: OBSTETRICS & GYNECOLOGY | Admitting: OBSTETRICS & GYNECOLOGY
Payer: COMMERCIAL

## 2019-05-13 VITALS
RESPIRATION RATE: 14 BRPM | WEIGHT: 178 LBS | HEIGHT: 62 IN | DIASTOLIC BLOOD PRESSURE: 60 MMHG | BODY MASS INDEX: 32.76 KG/M2 | HEART RATE: 72 BPM | OXYGEN SATURATION: 97 % | SYSTOLIC BLOOD PRESSURE: 98 MMHG | TEMPERATURE: 98.4 F

## 2019-05-13 DIAGNOSIS — N80.0 ADENOMYOSIS: ICD-10-CM

## 2019-05-13 DIAGNOSIS — Z90.710 S/P HYSTERECTOMY: Primary | ICD-10-CM

## 2019-05-13 LAB — EXT PREGNANCY TEST URINE: NEGATIVE

## 2019-05-13 PROCEDURE — 88307 TISSUE EXAM BY PATHOLOGIST: CPT | Performed by: PATHOLOGY

## 2019-05-13 PROCEDURE — 58571 TLH W/T/O 250 G OR LESS: CPT | Performed by: OBSTETRICS & GYNECOLOGY

## 2019-05-13 PROCEDURE — 81025 URINE PREGNANCY TEST: CPT | Performed by: ANESTHESIOLOGY

## 2019-05-13 RX ORDER — IBUPROFEN 200 MG
600 TABLET ORAL EVERY 6 HOURS SCHEDULED
Refills: 0
Start: 2019-05-13 | End: 2021-01-19

## 2019-05-13 RX ORDER — ALBUTEROL SULFATE 2.5 MG/3ML
2.5 SOLUTION RESPIRATORY (INHALATION) ONCE AS NEEDED
Status: DISCONTINUED | OUTPATIENT
Start: 2019-05-13 | End: 2019-05-13 | Stop reason: HOSPADM

## 2019-05-13 RX ORDER — MAGNESIUM HYDROXIDE 1200 MG/15ML
LIQUID ORAL AS NEEDED
Status: DISCONTINUED | OUTPATIENT
Start: 2019-05-13 | End: 2019-05-13 | Stop reason: HOSPADM

## 2019-05-13 RX ORDER — OXYCODONE HYDROCHLORIDE 5 MG/1
5 TABLET ORAL EVERY 6 HOURS PRN
Qty: 10 TABLET | Refills: 0 | Status: SHIPPED | OUTPATIENT
Start: 2019-05-13 | End: 2019-05-24 | Stop reason: ALTCHOICE

## 2019-05-13 RX ORDER — DEXAMETHASONE SODIUM PHOSPHATE 10 MG/ML
INJECTION, SOLUTION INTRAMUSCULAR; INTRAVENOUS AS NEEDED
Status: DISCONTINUED | OUTPATIENT
Start: 2019-05-13 | End: 2019-05-13 | Stop reason: SURG

## 2019-05-13 RX ORDER — FENTANYL CITRATE/PF 50 MCG/ML
25 SYRINGE (ML) INJECTION
Status: DISCONTINUED | OUTPATIENT
Start: 2019-05-13 | End: 2019-05-13 | Stop reason: HOSPADM

## 2019-05-13 RX ORDER — SCOLOPAMINE TRANSDERMAL SYSTEM 1 MG/1
1 PATCH, EXTENDED RELEASE TRANSDERMAL ONCE
Status: DISCONTINUED | OUTPATIENT
Start: 2019-05-13 | End: 2019-05-13 | Stop reason: HOSPADM

## 2019-05-13 RX ORDER — SODIUM CHLORIDE 9 MG/ML
125 INJECTION, SOLUTION INTRAVENOUS CONTINUOUS
Status: DISCONTINUED | OUTPATIENT
Start: 2019-05-13 | End: 2019-05-13 | Stop reason: HOSPADM

## 2019-05-13 RX ORDER — ACETAMINOPHEN 325 MG/1
650 TABLET ORAL EVERY 6 HOURS SCHEDULED
Qty: 30 TABLET | Refills: 0
Start: 2019-05-13 | End: 2019-05-24 | Stop reason: ALTCHOICE

## 2019-05-13 RX ORDER — ONDANSETRON 2 MG/ML
4 INJECTION INTRAMUSCULAR; INTRAVENOUS ONCE AS NEEDED
Status: DISCONTINUED | OUTPATIENT
Start: 2019-05-13 | End: 2019-05-13 | Stop reason: HOSPADM

## 2019-05-13 RX ORDER — ONDANSETRON 2 MG/ML
INJECTION INTRAMUSCULAR; INTRAVENOUS AS NEEDED
Status: DISCONTINUED | OUTPATIENT
Start: 2019-05-13 | End: 2019-05-13 | Stop reason: SURG

## 2019-05-13 RX ORDER — ACETAMINOPHEN 325 MG/1
650 TABLET ORAL EVERY 6 HOURS
Status: DISCONTINUED | OUTPATIENT
Start: 2019-05-13 | End: 2019-05-13 | Stop reason: HOSPADM

## 2019-05-13 RX ORDER — NEOSTIGMINE METHYLSULFATE 1 MG/ML
INJECTION INTRAVENOUS AS NEEDED
Status: DISCONTINUED | OUTPATIENT
Start: 2019-05-13 | End: 2019-05-13 | Stop reason: SURG

## 2019-05-13 RX ORDER — GLYCOPYRROLATE 0.2 MG/ML
INJECTION INTRAMUSCULAR; INTRAVENOUS AS NEEDED
Status: DISCONTINUED | OUTPATIENT
Start: 2019-05-13 | End: 2019-05-13 | Stop reason: SURG

## 2019-05-13 RX ORDER — MIDAZOLAM HYDROCHLORIDE 1 MG/ML
INJECTION INTRAMUSCULAR; INTRAVENOUS AS NEEDED
Status: DISCONTINUED | OUTPATIENT
Start: 2019-05-13 | End: 2019-05-13 | Stop reason: SURG

## 2019-05-13 RX ORDER — LIDOCAINE HYDROCHLORIDE 20 MG/ML
INJECTION, SOLUTION INFILTRATION; PERINEURAL AS NEEDED
Status: DISCONTINUED | OUTPATIENT
Start: 2019-05-13 | End: 2019-05-13 | Stop reason: SURG

## 2019-05-13 RX ORDER — ROCURONIUM BROMIDE 10 MG/ML
INJECTION, SOLUTION INTRAVENOUS AS NEEDED
Status: DISCONTINUED | OUTPATIENT
Start: 2019-05-13 | End: 2019-05-13 | Stop reason: SURG

## 2019-05-13 RX ORDER — OXYCODONE HYDROCHLORIDE 5 MG/1
5 TABLET ORAL EVERY 6 HOURS PRN
Status: DISCONTINUED | OUTPATIENT
Start: 2019-05-13 | End: 2019-05-13 | Stop reason: HOSPADM

## 2019-05-13 RX ORDER — KETOROLAC TROMETHAMINE 30 MG/ML
INJECTION, SOLUTION INTRAMUSCULAR; INTRAVENOUS AS NEEDED
Status: DISCONTINUED | OUTPATIENT
Start: 2019-05-13 | End: 2019-05-13 | Stop reason: SURG

## 2019-05-13 RX ORDER — HYDROMORPHONE HYDROCHLORIDE 2 MG/ML
INJECTION, SOLUTION INTRAMUSCULAR; INTRAVENOUS; SUBCUTANEOUS AS NEEDED
Status: DISCONTINUED | OUTPATIENT
Start: 2019-05-13 | End: 2019-05-13 | Stop reason: SURG

## 2019-05-13 RX ORDER — FENTANYL CITRATE 50 UG/ML
INJECTION, SOLUTION INTRAMUSCULAR; INTRAVENOUS AS NEEDED
Status: DISCONTINUED | OUTPATIENT
Start: 2019-05-13 | End: 2019-05-13 | Stop reason: SURG

## 2019-05-13 RX ORDER — CEFAZOLIN SODIUM 2 G/50ML
SOLUTION INTRAVENOUS AS NEEDED
Status: DISCONTINUED | OUTPATIENT
Start: 2019-05-13 | End: 2019-05-13 | Stop reason: SURG

## 2019-05-13 RX ORDER — PROPOFOL 10 MG/ML
INJECTION, EMULSION INTRAVENOUS AS NEEDED
Status: DISCONTINUED | OUTPATIENT
Start: 2019-05-13 | End: 2019-05-13 | Stop reason: SURG

## 2019-05-13 RX ORDER — ATROPINE SULFATE 1 MG/ML
INJECTION, SOLUTION INTRAMUSCULAR; INTRAVENOUS; SUBCUTANEOUS AS NEEDED
Status: DISCONTINUED | OUTPATIENT
Start: 2019-05-13 | End: 2019-05-13 | Stop reason: SURG

## 2019-05-13 RX ADMIN — LIDOCAINE HYDROCHLORIDE 2 ML: 20 INJECTION, SOLUTION INFILTRATION; PERINEURAL at 07:32

## 2019-05-13 RX ADMIN — CEFAZOLIN SODIUM 2000 MG: 2 SOLUTION INTRAVENOUS at 07:28

## 2019-05-13 RX ADMIN — HYDROMORPHONE HYDROCHLORIDE 1 MG: 2 INJECTION, SOLUTION INTRAMUSCULAR; INTRAVENOUS; SUBCUTANEOUS at 10:12

## 2019-05-13 RX ADMIN — SODIUM CHLORIDE 125 ML/HR: 0.9 INJECTION, SOLUTION INTRAVENOUS at 10:28

## 2019-05-13 RX ADMIN — FENTANYL CITRATE 50 MCG: 50 INJECTION, SOLUTION INTRAMUSCULAR; INTRAVENOUS at 07:49

## 2019-05-13 RX ADMIN — ATROPINE SULFATE 0.5 MG: 1 INJECTION, SOLUTION INTRAMUSCULAR; INTRAVENOUS; SUBCUTANEOUS at 07:47

## 2019-05-13 RX ADMIN — MIDAZOLAM 2 MG: 1 INJECTION INTRAMUSCULAR; INTRAVENOUS at 07:28

## 2019-05-13 RX ADMIN — NEOSTIGMINE METHYLSULFATE 3 MG: 1 INJECTION, SOLUTION INTRAVENOUS at 09:53

## 2019-05-13 RX ADMIN — KETOROLAC TROMETHAMINE 30 MG: 30 INJECTION, SOLUTION INTRAMUSCULAR at 10:06

## 2019-05-13 RX ADMIN — SODIUM CHLORIDE: 0.9 INJECTION, SOLUTION INTRAVENOUS at 08:33

## 2019-05-13 RX ADMIN — SCOPALAMINE 1 PATCH: 1 PATCH, EXTENDED RELEASE TRANSDERMAL at 06:40

## 2019-05-13 RX ADMIN — HYDROMORPHONE HYDROCHLORIDE 0.5 MG: 2 INJECTION, SOLUTION INTRAMUSCULAR; INTRAVENOUS; SUBCUTANEOUS at 09:18

## 2019-05-13 RX ADMIN — FENTANYL CITRATE 150 MCG: 50 INJECTION, SOLUTION INTRAMUSCULAR; INTRAVENOUS at 07:32

## 2019-05-13 RX ADMIN — DEXAMETHASONE SODIUM PHOSPHATE 8 MG: 10 INJECTION, SOLUTION INTRAMUSCULAR; INTRAVENOUS at 07:42

## 2019-05-13 RX ADMIN — HYDROMORPHONE HYDROCHLORIDE 0.5 MG: 2 INJECTION, SOLUTION INTRAMUSCULAR; INTRAVENOUS; SUBCUTANEOUS at 09:44

## 2019-05-13 RX ADMIN — GLYCOPYRROLATE 0.4 MG: 0.2 INJECTION INTRAMUSCULAR; INTRAVENOUS at 09:53

## 2019-05-13 RX ADMIN — PROPOFOL 200 MG: 10 INJECTION, EMULSION INTRAVENOUS at 07:32

## 2019-05-13 RX ADMIN — ROCURONIUM BROMIDE 20 MG: 10 INJECTION, SOLUTION INTRAVENOUS at 08:49

## 2019-05-13 RX ADMIN — SODIUM CHLORIDE 125 ML/HR: 0.9 INJECTION, SOLUTION INTRAVENOUS at 07:02

## 2019-05-13 RX ADMIN — ONDANSETRON HYDROCHLORIDE 4 MG: 2 INJECTION, SOLUTION INTRAVENOUS at 09:45

## 2019-05-13 RX ADMIN — ROCURONIUM BROMIDE 50 MG: 10 INJECTION, SOLUTION INTRAVENOUS at 07:32

## 2019-05-15 ENCOUNTER — DOCUMENTATION (OUTPATIENT)
Dept: GYNECOLOGY | Facility: CLINIC | Age: 54
End: 2019-05-15

## 2019-05-24 ENCOUNTER — OFFICE VISIT (OUTPATIENT)
Dept: GYNECOLOGY | Facility: CLINIC | Age: 54
End: 2019-05-24

## 2019-05-24 VITALS
WEIGHT: 181 LBS | DIASTOLIC BLOOD PRESSURE: 80 MMHG | HEIGHT: 62 IN | SYSTOLIC BLOOD PRESSURE: 118 MMHG | BODY MASS INDEX: 33.31 KG/M2

## 2019-05-24 DIAGNOSIS — Z48.89 POSTOPERATIVE VISIT: Primary | ICD-10-CM

## 2019-05-24 PROCEDURE — 99024 POSTOP FOLLOW-UP VISIT: CPT | Performed by: OBSTETRICS & GYNECOLOGY

## 2019-05-28 DIAGNOSIS — T81.49XA WOUND INFECTION AFTER SURGERY: Primary | ICD-10-CM

## 2019-05-28 RX ORDER — CEPHALEXIN 500 MG/1
500 CAPSULE ORAL 4 TIMES DAILY
Qty: 28 CAPSULE | Refills: 0 | Status: SHIPPED | OUTPATIENT
Start: 2019-05-28 | End: 2019-06-04

## 2019-06-11 ENCOUNTER — OFFICE VISIT (OUTPATIENT)
Dept: GYNECOLOGY | Facility: CLINIC | Age: 54
End: 2019-06-11

## 2019-06-11 VITALS
SYSTOLIC BLOOD PRESSURE: 122 MMHG | HEART RATE: 83 BPM | DIASTOLIC BLOOD PRESSURE: 82 MMHG | WEIGHT: 183.5 LBS | BODY MASS INDEX: 33.77 KG/M2 | HEIGHT: 62 IN

## 2019-06-11 DIAGNOSIS — Z48.89 POSTOPERATIVE VISIT: Primary | ICD-10-CM

## 2019-06-11 PROCEDURE — 99024 POSTOP FOLLOW-UP VISIT: CPT | Performed by: OBSTETRICS & GYNECOLOGY

## 2019-06-21 ENCOUNTER — OFFICE VISIT (OUTPATIENT)
Dept: GYNECOLOGY | Facility: CLINIC | Age: 54
End: 2019-06-21

## 2019-06-21 VITALS
HEART RATE: 65 BPM | SYSTOLIC BLOOD PRESSURE: 110 MMHG | BODY MASS INDEX: 34.23 KG/M2 | WEIGHT: 186 LBS | DIASTOLIC BLOOD PRESSURE: 74 MMHG | HEIGHT: 62 IN

## 2019-06-21 DIAGNOSIS — Z48.89 POSTOPERATIVE VISIT: Primary | ICD-10-CM

## 2019-06-21 PROCEDURE — 99024 POSTOP FOLLOW-UP VISIT: CPT | Performed by: OBSTETRICS & GYNECOLOGY

## 2019-09-20 ENCOUNTER — HOSPITAL ENCOUNTER (EMERGENCY)
Facility: HOSPITAL | Age: 54
Discharge: HOME | End: 2019-09-20
Attending: EMERGENCY MEDICINE | Admitting: EMERGENCY MEDICINE
Payer: COMMERCIAL

## 2019-09-20 ENCOUNTER — APPOINTMENT (EMERGENCY)
Dept: CT IMAGING | Facility: HOSPITAL | Age: 54
End: 2019-09-20
Payer: COMMERCIAL

## 2019-09-20 VITALS
WEIGHT: 182.98 LBS | HEART RATE: 69 BPM | BODY MASS INDEX: 33.47 KG/M2 | RESPIRATION RATE: 18 BRPM | SYSTOLIC BLOOD PRESSURE: 132 MMHG | DIASTOLIC BLOOD PRESSURE: 76 MMHG | TEMPERATURE: 97.7 F | OXYGEN SATURATION: 99 %

## 2019-09-20 DIAGNOSIS — R10.9 ABDOMINAL PAIN: Primary | ICD-10-CM

## 2019-09-20 LAB
ALBUMIN SERPL BCP-MCNC: 3.7 G/DL (ref 3.5–5)
ALP SERPL-CCNC: 48 U/L (ref 46–116)
ALT SERPL W P-5'-P-CCNC: 21 U/L (ref 12–78)
ANION GAP SERPL CALCULATED.3IONS-SCNC: 8 MMOL/L (ref 4–13)
AST SERPL W P-5'-P-CCNC: 30 U/L (ref 5–45)
BACTERIA UR QL AUTO: ABNORMAL /HPF
BASOPHILS # BLD AUTO: 0.01 THOUSANDS/ΜL (ref 0–0.1)
BASOPHILS NFR BLD AUTO: 0 % (ref 0–1)
BILIRUB SERPL-MCNC: 0.94 MG/DL (ref 0.2–1)
BILIRUB UR QL STRIP: NEGATIVE
BUN SERPL-MCNC: 15 MG/DL (ref 5–25)
CALCIUM SERPL-MCNC: 9.1 MG/DL (ref 8.3–10.1)
CHLORIDE SERPL-SCNC: 103 MMOL/L (ref 100–108)
CLARITY UR: ABNORMAL
CLARITY, POC: ABNORMAL
CO2 SERPL-SCNC: 26 MMOL/L (ref 21–32)
COLOR UR: YELLOW
COLOR, POC: YELLOW
CREAT SERPL-MCNC: 0.67 MG/DL (ref 0.6–1.3)
EOSINOPHIL # BLD AUTO: 0.01 THOUSAND/ΜL (ref 0–0.61)
EOSINOPHIL NFR BLD AUTO: 0 % (ref 0–6)
ERYTHROCYTE [DISTWIDTH] IN BLOOD BY AUTOMATED COUNT: 13.6 % (ref 11.6–15.1)
GFR SERPL CREATININE-BSD FRML MDRD: 99 ML/MIN/1.73SQ M
GLUCOSE SERPL-MCNC: 96 MG/DL (ref 65–140)
GLUCOSE UR STRIP-MCNC: NEGATIVE MG/DL
HCT VFR BLD AUTO: 40.8 % (ref 34.8–46.1)
HGB BLD-MCNC: 13.5 G/DL (ref 11.5–15.4)
HGB UR QL STRIP.AUTO: NEGATIVE
IMM GRANULOCYTES # BLD AUTO: 0.02 THOUSAND/UL (ref 0–0.2)
IMM GRANULOCYTES NFR BLD AUTO: 0 % (ref 0–2)
KETONES UR STRIP-MCNC: NEGATIVE MG/DL
LEUKOCYTE ESTERASE UR QL STRIP: NEGATIVE
LIPASE SERPL-CCNC: 131 U/L (ref 73–393)
LYMPHOCYTES # BLD AUTO: 1.48 THOUSANDS/ΜL (ref 0.6–4.47)
LYMPHOCYTES NFR BLD AUTO: 22 % (ref 14–44)
MCH RBC QN AUTO: 30.2 PG (ref 26.8–34.3)
MCHC RBC AUTO-ENTMCNC: 33.1 G/DL (ref 31.4–37.4)
MCV RBC AUTO: 91 FL (ref 82–98)
MONOCYTES # BLD AUTO: 0.59 THOUSAND/ΜL (ref 0.17–1.22)
MONOCYTES NFR BLD AUTO: 9 % (ref 4–12)
NEUTROPHILS # BLD AUTO: 4.71 THOUSANDS/ΜL (ref 1.85–7.62)
NEUTS SEG NFR BLD AUTO: 69 % (ref 43–75)
NITRITE UR QL STRIP: NEGATIVE
NON-SQ EPI CELLS URNS QL MICRO: ABNORMAL /HPF
NRBC BLD AUTO-RTO: 0 /100 WBCS
PH UR STRIP.AUTO: 8 [PH] (ref 4.5–8)
PLATELET # BLD AUTO: 262 THOUSANDS/UL (ref 149–390)
PMV BLD AUTO: 11.6 FL (ref 8.9–12.7)
POTASSIUM SERPL-SCNC: 4.4 MMOL/L (ref 3.5–5.3)
PROT SERPL-MCNC: 7.5 G/DL (ref 6.4–8.2)
PROT UR STRIP-MCNC: ABNORMAL MG/DL
RBC # BLD AUTO: 4.47 MILLION/UL (ref 3.81–5.12)
RBC #/AREA URNS AUTO: ABNORMAL /HPF
SODIUM SERPL-SCNC: 137 MMOL/L (ref 136–145)
SP GR UR STRIP.AUTO: 1.01 (ref 1–1.03)
UROBILINOGEN UR QL STRIP.AUTO: 0.2 E.U./DL
WBC # BLD AUTO: 6.82 THOUSAND/UL (ref 4.31–10.16)
WBC #/AREA URNS AUTO: ABNORMAL /HPF

## 2019-09-20 PROCEDURE — 99284 EMERGENCY DEPT VISIT MOD MDM: CPT

## 2019-09-20 PROCEDURE — 85025 COMPLETE CBC W/AUTO DIFF WBC: CPT | Performed by: EMERGENCY MEDICINE

## 2019-09-20 PROCEDURE — 99284 EMERGENCY DEPT VISIT MOD MDM: CPT | Performed by: EMERGENCY MEDICINE

## 2019-09-20 PROCEDURE — 96374 THER/PROPH/DIAG INJ IV PUSH: CPT

## 2019-09-20 PROCEDURE — 83690 ASSAY OF LIPASE: CPT | Performed by: EMERGENCY MEDICINE

## 2019-09-20 PROCEDURE — NS001 PR NO SIGNATURE OR ATTESTATION: Performed by: OBSTETRICS & GYNECOLOGY

## 2019-09-20 PROCEDURE — 74177 CT ABD & PELVIS W/CONTRAST: CPT

## 2019-09-20 PROCEDURE — 80053 COMPREHEN METABOLIC PANEL: CPT | Performed by: EMERGENCY MEDICINE

## 2019-09-20 PROCEDURE — 96361 HYDRATE IV INFUSION ADD-ON: CPT

## 2019-09-20 PROCEDURE — 36415 COLL VENOUS BLD VENIPUNCTURE: CPT | Performed by: EMERGENCY MEDICINE

## 2019-09-20 PROCEDURE — 81001 URINALYSIS AUTO W/SCOPE: CPT

## 2019-09-20 RX ORDER — ONDANSETRON 4 MG/1
4 TABLET, ORALLY DISINTEGRATING ORAL EVERY 6 HOURS PRN
Qty: 10 TABLET | Refills: 0 | Status: SHIPPED | OUTPATIENT
Start: 2019-09-20 | End: 2020-02-06

## 2019-09-20 RX ORDER — ONDANSETRON 2 MG/ML
4 INJECTION INTRAMUSCULAR; INTRAVENOUS ONCE
Status: COMPLETED | OUTPATIENT
Start: 2019-09-20 | End: 2019-09-20

## 2019-09-20 RX ADMIN — IOHEXOL 100 ML: 350 INJECTION, SOLUTION INTRAVENOUS at 11:58

## 2019-09-20 RX ADMIN — SODIUM CHLORIDE 1000 ML: 0.9 INJECTION, SOLUTION INTRAVENOUS at 11:03

## 2019-09-20 RX ADMIN — ONDANSETRON 4 MG: 2 INJECTION INTRAMUSCULAR; INTRAVENOUS at 11:08
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
## 2019-09-25 ENCOUNTER — OFFICE VISIT (OUTPATIENT)
Dept: FAMILY MEDICINE CLINIC | Facility: CLINIC | Age: 54
End: 2019-09-25
Payer: COMMERCIAL

## 2019-09-25 VITALS
BODY MASS INDEX: 34.23 KG/M2 | TEMPERATURE: 97.8 F | DIASTOLIC BLOOD PRESSURE: 82 MMHG | RESPIRATION RATE: 17 BRPM | HEIGHT: 62 IN | SYSTOLIC BLOOD PRESSURE: 128 MMHG | HEART RATE: 67 BPM | WEIGHT: 186 LBS | OXYGEN SATURATION: 98 %

## 2019-09-25 DIAGNOSIS — R10.12 LEFT UPPER QUADRANT PAIN: Primary | ICD-10-CM

## 2019-09-25 PROCEDURE — 99213 OFFICE O/P EST LOW 20 MIN: CPT | Performed by: PHYSICIAN ASSISTANT

## 2019-09-25 PROCEDURE — 3008F BODY MASS INDEX DOCD: CPT | Performed by: PHYSICIAN ASSISTANT

## 2019-09-25 NOTE — PROGRESS NOTES
Assessment/Plan:    1  Left upper quadrant pain  Reviewed lab work which was unremarkable  Reviewed CT scan  Other than a hiatal hernia it was unremarkable  Discussed possible causes of abdominal pain including the hiatal hernia, gastritis, reflux, viral syndrome  Discussed treating empirically with PPI  Patient was agreeable  Start omeprazole once daily  Recommended bland/brat diet  Avoid greasy and fried foods  Avoid acidic food  Monitor for triggers and irritants  Plenty of fluids, stay hydrated  Avoid caffeine and alcohol  Avoid NSAIDs  Tylenol as needed  If no improvement recommended seeing a gastroenterologist   Patient was agreeable  ER precaution  Follow up here as needed  No problem-specific Assessment & Plan notes found for this encounter  Patient Active Problem List   Diagnosis    Anxiety    Migraine headache    Depression    Vitamin D deficiency    Chronic abdominal pain    Adenomyosis     Subjective:      Patient ID: Christina Sutton is a 62 y o  female  Patient is here to follow-up on an ER visit from 09/20  States she developed abdominal pain that day  States he came out of nowhere and was severe  It has been constant since  Has varied from a 4/10 to a 10/10 pain  States it is generalized over her abdomen, but worse over her left upper quadrant  Describes it as a sharp and achy pain  Denies radiation  Denies provocation  She states it started out of the blue at work  Nothing makes it better or worse  She has tried taking antacids and Tylenol with no relief  States she was also nauseous the 1st day and vomited several times  She was nauseous over the weekend as well, but no vomiting  Admits to bloating, and abdomen feeling full  She states the ER did give her Zofran which helped  Denies hematemesis  Denies constipation or diarrhea  Denies hematochezia or melena  Denies fever  Denies cold or flu-like symptoms    She does admit to getting reflux occasionally  Denies indigestion  Patient was concerned and what brought her to the ER was her recent abdominal surgery  She states she had a hysterectomy in May  In the 1980s she did have an appendectomy  Several months after she had a small bowel obstruction  She states her surgeon for the hysterectomy did tell her she had a lot of adhesions in her abdomen  She states the ER told her her abdominal pain was due to a viral infection  The following portions of the patient's history were reviewed and updated as appropriate: allergies, current medications, past family history, past medical history, past social history, past surgical history and problem list     Review of Systems   Constitutional: Positive for appetite change (Decreased)  Negative for chills, fatigue, fever and unexpected weight change  HENT: Negative for congestion, ear pain, postnasal drip, rhinorrhea, sinus pressure, sinus pain and sore throat  Respiratory: Negative for cough, chest tightness, shortness of breath and wheezing  Cardiovascular: Negative for chest pain, palpitations and leg swelling  Gastrointestinal: Positive for abdominal distention, abdominal pain, nausea and vomiting  Negative for anal bleeding, blood in stool, constipation, diarrhea and rectal pain  Endocrine: Negative for cold intolerance, heat intolerance, polydipsia, polyphagia and polyuria  Genitourinary: Negative for difficulty urinating, dysuria, frequency and urgency  Musculoskeletal: Negative for arthralgias and myalgias  Skin: Negative for color change, pallor and rash  Allergic/Immunologic: Negative for environmental allergies  Neurological: Negative for dizziness, light-headedness and headaches  Hematological: Negative for adenopathy           Objective:      /82 (BP Location: Left arm, Patient Position: Sitting, Cuff Size: Standard)   Pulse 67   Temp 97 8 °F (36 6 °C) (Tympanic)   Resp 17   Ht 5' 2" (1 575 m)   Wt 84 4 kg (186 lb)   SpO2 98%   BMI 34 02 kg/m²          Physical Exam   Constitutional: She is oriented to person, place, and time  She appears well-developed and well-nourished  HENT:   Head: Normocephalic and atraumatic  Right Ear: Hearing, tympanic membrane, external ear and ear canal normal    Left Ear: Hearing, tympanic membrane, external ear and ear canal normal    Nose: Nose normal    Mouth/Throat: Uvula is midline, oropharynx is clear and moist and mucous membranes are normal    Eyes: Pupils are equal, round, and reactive to light  Conjunctivae and EOM are normal    Neck: Normal range of motion and full passive range of motion without pain  Neck supple  No thyroid mass and no thyromegaly present  Cardiovascular: Normal rate, regular rhythm, S1 normal, S2 normal, normal heart sounds, intact distal pulses and normal pulses  Pulmonary/Chest: Effort normal and breath sounds normal    Abdominal: Soft  Normal appearance and bowel sounds are normal  She exhibits no mass  There is no hepatosplenomegaly  There is generalized tenderness and tenderness in the left upper quadrant  There is no CVA tenderness  No hernia  Well-healed large abdominal scar right lower quadrant  Five small well-healed scars scattered over abdomen  Musculoskeletal: Normal range of motion  Lymphadenopathy:     She has no cervical adenopathy  Neurological: She is alert and oriented to person, place, and time  She has normal strength and normal reflexes  Skin: Skin is warm, dry and intact  No rash noted  Psychiatric: She has a normal mood and affect  Her speech is normal and behavior is normal  Judgment and thought content normal  Cognition and memory are normal    Nursing note and vitals reviewed        Current Outpatient Medications on File Prior to Visit   Medication Sig Dispense Refill    Cholecalciferol (VITAMIN D PO) Take 2,000 Units by mouth daily      fluticasone (FLONASE) 50 mcg/act nasal spray USE 2 SPRAYS IN EACH NOSTRIL DAILY (Patient taking differently: USE 2 SPRAYS IN EACH NOSTRIL DAILY IN AM) 16 g 5    ibuprofen (MOTRIN) 200 mg tablet Take 3 tablets (600 mg total) by mouth every 6 (six) hours For 3 days, then as needed  0    levocetirizine (XYZAL) 5 MG tablet Take 5 mg by mouth every evening      LORazepam (ATIVAN) 0 5 mg tablet Take 0 5 mg by mouth as needed       ondansetron (ZOFRAN-ODT) 4 mg disintegrating tablet Take 1 tablet (4 mg total) by mouth every 6 (six) hours as needed for nausea or vomiting 10 tablet 0    sertraline (ZOLOFT) 25 mg tablet Take 1 tablet (25 mg total) by mouth 2 (two) times a day (Patient taking differently: Take 50 mg by mouth daily at bedtime ) 180 tablet 4    SUMAtriptan (IMITREX) 100 mg tablet TAKE 1 TABLET AT ONSET OF MIGRAINE HEADACHE  MAY REPEAT IN 2 HOURS IF NEEDED 27 tablet 1    EPINEPHrine (EPIPEN) 0 3 mg/0 3 mL SOAJ Inject 0 3 mL (0 3 mg total) into a muscle once for 1 dose 2 each 1     No current facility-administered medications on file prior to visit

## 2019-11-15 ENCOUNTER — OFFICE VISIT (OUTPATIENT)
Dept: FAMILY MEDICINE CLINIC | Facility: CLINIC | Age: 54
End: 2019-11-15
Payer: COMMERCIAL

## 2019-11-15 VITALS
TEMPERATURE: 98 F | WEIGHT: 188 LBS | BODY MASS INDEX: 34.6 KG/M2 | RESPIRATION RATE: 16 BRPM | DIASTOLIC BLOOD PRESSURE: 90 MMHG | SYSTOLIC BLOOD PRESSURE: 130 MMHG | HEIGHT: 62 IN | OXYGEN SATURATION: 97 % | HEART RATE: 74 BPM

## 2019-11-15 DIAGNOSIS — E04.1 THYROID NODULE: Primary | ICD-10-CM

## 2019-11-15 PROCEDURE — 1036F TOBACCO NON-USER: CPT | Performed by: FAMILY MEDICINE

## 2019-11-15 PROCEDURE — 99214 OFFICE O/P EST MOD 30 MIN: CPT | Performed by: FAMILY MEDICINE

## 2019-11-15 NOTE — PROGRESS NOTES
[facility]      NAME: Gege Funes  AGE: 62 y o  SEX: female  : 1965   MRN: [id number]    DATE: 11/15/2019  TIME: 4:21 PM    Assessment and Plan     Problem List Items Addressed This Visit     None      Visit Diagnoses     Thyroid nodule    -  Primary    Relevant Orders    US thyroid              Return to office in:  P r n  Chief Complaint     Chief Complaint   Patient presents with    Follow-up     discuss US for stroke and thyroid       History of Present Illness     Patient had an ultrasound screening done at work for thyroid and carotid arteries  Carotid arteries were normal but did note a 7 mm x 10 mm thyroid nodule on the left lobe of the thyroid  Patient is asymptomatic  Thyroid blood work done within the last year was normal       The following portions of the patient's history were reviewed and updated as appropriate: allergies, current medications, past family history, past medical history, past social history, past surgical history and problem list     Review of Systems   Review of Systems   Constitutional: Negative  HENT: Negative  Negative for congestion, ear pain, hearing loss, nosebleeds, sore throat and trouble swallowing  Eyes: Negative  Respiratory: Negative for apnea, cough, chest tightness, shortness of breath and wheezing  Cardiovascular: Negative  Gastrointestinal: Negative for abdominal pain, blood in stool, constipation, diarrhea, nausea and vomiting  Endocrine: Negative  Genitourinary: Negative for difficulty urinating, dysuria, frequency, hematuria and urgency  Musculoskeletal: Negative for arthralgias, joint swelling and myalgias  Skin: Negative for rash  Neurological: Negative for dizziness, syncope, light-headedness, numbness and headaches  Hematological: Negative  Psychiatric/Behavioral: Negative for confusion, dysphoric mood and sleep disturbance  The patient is not nervous/anxious          Active Problem List Patient Active Problem List   Diagnosis    Anxiety    Migraine headache    Depression    Vitamin D deficiency    Chronic abdominal pain    Adenomyosis       Objective   /90 (BP Location: Left arm, Patient Position: Sitting, Cuff Size: Adult)   Pulse 74   Temp 98 °F (36 7 °C) (Tympanic)   Resp 16   Ht 5' 1 81" (1 57 m)   Wt 85 3 kg (188 lb)   SpO2 97%   BMI 34 60 kg/m²     Physical Exam   Neck: No thyromegaly (Small left thyroid nodule) present  Current Medications     Current Outpatient Medications:     Cholecalciferol (VITAMIN D PO), Take 2,000 Units by mouth daily, Disp: , Rfl:     fluticasone (FLONASE) 50 mcg/act nasal spray, USE 2 SPRAYS IN EACH NOSTRIL DAILY (Patient taking differently: USE 2 SPRAYS IN EACH NOSTRIL DAILY IN AM), Disp: 16 g, Rfl: 5    ibuprofen (MOTRIN) 200 mg tablet, Take 3 tablets (600 mg total) by mouth every 6 (six) hours For 3 days, then as needed, Disp: , Rfl: 0    levocetirizine (XYZAL) 5 MG tablet, Take 5 mg by mouth every evening, Disp: , Rfl:     LORazepam (ATIVAN) 0 5 mg tablet, Take 0 5 mg by mouth as needed , Disp: , Rfl:     ondansetron (ZOFRAN-ODT) 4 mg disintegrating tablet, Take 1 tablet (4 mg total) by mouth every 6 (six) hours as needed for nausea or vomiting, Disp: 10 tablet, Rfl: 0    sertraline (ZOLOFT) 25 mg tablet, Take 1 tablet (25 mg total) by mouth 2 (two) times a day (Patient taking differently: Take 50 mg by mouth daily at bedtime ), Disp: 180 tablet, Rfl: 4    SUMAtriptan (IMITREX) 100 mg tablet, TAKE 1 TABLET AT ONSET OF MIGRAINE HEADACHE   MAY REPEAT IN 2 HOURS IF NEEDED, Disp: 27 tablet, Rfl: 1    EPINEPHrine (EPIPEN) 0 3 mg/0 3 mL SOAJ, Inject 0 3 mL (0 3 mg total) into a muscle once for 1 dose, Disp: 2 each, Rfl: 1    Health Maintenance     Health Maintenance   Topic Date Due    Hepatitis C Screening  1965    HIV Screening  07/19/1978    BMI: Followup Plan  07/19/1981    DTaP,Tdap,and Td Vaccines (2 - Td) 07/02/2018    MAMMOGRAM  12/01/2018    Influenza Vaccine  07/01/2019    BMI: Adult  09/25/2020    CRC Screening: Colonoscopy  03/19/2024    Pneumococcal Vaccine: 65+ Years (1 of 2 - PCV13) 07/19/2028    Pneumococcal Vaccine: Pediatrics (0 to 5 Years) and At-Risk Patients (6 to 59 Years)  Aged Out    HIB Vaccine  Aged Out    Hepatitis B Vaccine  Aged Out    IPV Vaccine  Aged Out    Hepatitis A Vaccine  Aged Out    Meningococcal ACWY Vaccine  Aged Out    HPV Vaccine  Aged [employer] History   Administered Date(s) Administered    INFLUENZA 01/01/2007, 11/01/2015, 10/18/2018    Influenza Quadrivalent, 6-35 Months IM 11/01/2015    Influenza TIV (IM) 11/01/2016    Tdap 07/02/2008       Reji Gusman DO  [facility]

## 2019-11-19 ENCOUNTER — HOSPITAL ENCOUNTER (OUTPATIENT)
Dept: ULTRASOUND IMAGING | Facility: MEDICAL CENTER | Age: 54
Discharge: HOME | End: 2019-11-19
Payer: COMMERCIAL

## 2019-11-19 DIAGNOSIS — E04.1 THYROID NODULE: ICD-10-CM

## 2019-11-19 PROCEDURE — 76536 US EXAM OF HEAD AND NECK: CPT

## 2020-02-06 ENCOUNTER — TELEPHONE (OUTPATIENT)
Dept: FAMILY MEDICINE CLINIC | Facility: CLINIC | Age: 55
End: 2020-02-06

## 2020-02-06 ENCOUNTER — OFFICE VISIT (OUTPATIENT)
Dept: URGENT CARE | Facility: CLINIC | Age: 55
End: 2020-02-06
Payer: COMMERCIAL

## 2020-02-06 VITALS
WEIGHT: 180 LBS | TEMPERATURE: 98.6 F | BODY MASS INDEX: 33.13 KG/M2 | RESPIRATION RATE: 17 BRPM | HEART RATE: 97 BPM | OXYGEN SATURATION: 97 % | HEIGHT: 62 IN

## 2020-02-06 DIAGNOSIS — K52.9 NONINFECTIOUS GASTROENTERITIS, UNSPECIFIED TYPE: Primary | ICD-10-CM

## 2020-02-06 DIAGNOSIS — R11.0 NAUSEA: ICD-10-CM

## 2020-02-06 PROCEDURE — G0382 LEV 3 HOSP TYPE B ED VISIT: HCPCS | Performed by: NURSE PRACTITIONER

## 2020-02-06 RX ORDER — ONDANSETRON 4 MG/1
4 TABLET, ORALLY DISINTEGRATING ORAL EVERY 8 HOURS PRN
Qty: 12 TABLET | Refills: 0 | Status: SHIPPED | OUTPATIENT
Start: 2020-02-06 | End: 2021-01-14

## 2020-02-06 NOTE — TELEPHONE ENCOUNTER
Patient called this morning complaining about diarrhea an d vomiting I told her she can come and see a doctor but she refused I told her to drink a lot of fluid

## 2020-02-07 NOTE — TELEPHONE ENCOUNTER
Pt called back wanting to know if we could call something into the pharmacy for her she can't vomiting she can't even keep ice chips down  She has had diarrhea since Tuesday and vomiting since yesterday  I did tell her we may not called anything without her being seen first  Selvin Rojas she is too sick to even come in

## 2020-02-07 NOTE — PATIENT INSTRUCTIONS
Go to the ER if worse,   Take zofran as directed  Increase fluids if unable to keep food and fluids down in the next 24 hours go to the ER  Take immodium over the counter as directed  Pains worse go to the ER

## 2020-02-07 NOTE — PROGRESS NOTES
NAME: Winston Hansen is a 62 y o  female  : 1965    MRN: [id number]    Pulse 97   Temp 98 6 °F (37 °C) (Tympanic)   Resp 17   Ht 5' 2" (1 575 m)   Wt 81 6 kg (180 lb)   SpO2 97%   BMI 32 92 kg/m²     Assessment and Plan   Noninfectious gastroenteritis, unspecified type [K52 9]  1  Noninfectious gastroenteritis, unspecified type  ondansetron (ZOFRAN-ODT) 4 mg disintegrating tablet   2  Nausea  ondansetron (ZOFRAN-ODT) 4 mg disintegrating tablet       Curly Ledezma was seen today for nausea  Diagnoses and all orders for this visit:    Noninfectious gastroenteritis, unspecified type  -     ondansetron (ZOFRAN-ODT) 4 mg disintegrating tablet; Take 1 tablet (4 mg total) by mouth every 8 (eight) hours as needed for nausea or vomiting for up to 12 doses    Nausea  -     ondansetron (ZOFRAN-ODT) 4 mg disintegrating tablet; Take 1 tablet (4 mg total) by mouth every 8 (eight) hours as needed for nausea or vomiting for up to 12 doses        Patient Instructions   Patient Instructions   Go to the ER if worse,   Take zofran as directed  Increase fluids if unable to keep food and fluids down in the next 24 hours go to the ER  Take immodium over the counter as directed  Pains worse go to the ER  Proceed to ER if symptoms worsen  Chief Complaint     Chief Complaint   Patient presents with    Nausea     Patient started on Tuesday with a low graded fever, nauses and diarrhea, Patient did take Advil for the fever  Has not been able to keep any fluids or food down  History of Present Illness     Patient here today with nausea and has upset stomach for the past few days and has had low grade fevers, she feels nauseated and feels worse, today she has had vomiting and diarrhea and has upset stomach at this time, epigastric pain intermittent, had chicken soup from [facility]  She has had a flu shot this season       Nausea   Associated symptoms include chest pain, congestion, coughing, fatigue, a fever and nausea  Pertinent negatives include no abdominal pain or sore throat  Review of Systems   Review of Systems   Constitutional: Positive for fatigue and fever  HENT: Positive for congestion and postnasal drip  Negative for sore throat  Respiratory: Positive for cough  Cardiovascular: Positive for chest pain  Gastrointestinal: Positive for nausea  Negative for abdominal pain  Current Medications       Current Outpatient Medications:     Cholecalciferol (VITAMIN D PO), Take 2,000 Units by mouth daily, Disp: , Rfl:     fluticasone (FLONASE) 50 mcg/act nasal spray, USE 2 SPRAYS IN EACH NOSTRIL DAILY (Patient taking differently: USE 2 SPRAYS IN EACH NOSTRIL DAILY IN AM), Disp: 16 g, Rfl: 5    levocetirizine (XYZAL) 5 MG tablet, Take 5 mg by mouth every evening, Disp: , Rfl:     LORazepam (ATIVAN) 0 5 mg tablet, Take 0 5 mg by mouth as needed , Disp: , Rfl:     sertraline (ZOLOFT) 25 mg tablet, Take 1 tablet (25 mg total) by mouth 2 (two) times a day (Patient taking differently: Take 50 mg by mouth daily at bedtime ), Disp: 180 tablet, Rfl: 4    SUMAtriptan (IMITREX) 100 mg tablet, TAKE 1 TABLET AT ONSET OF MIGRAINE HEADACHE   MAY REPEAT IN 2 HOURS IF NEEDED, Disp: 27 tablet, Rfl: 1    EPINEPHrine (EPIPEN) 0 3 mg/0 3 mL SOAJ, Inject 0 3 mL (0 3 mg total) into a muscle once for 1 dose, Disp: 2 each, Rfl: 1    ibuprofen (MOTRIN) 200 mg tablet, Take 3 tablets (600 mg total) by mouth every 6 (six) hours For 3 days, then as needed, Disp: , Rfl: 0    ondansetron (ZOFRAN-ODT) 4 mg disintegrating tablet, Take 1 tablet (4 mg total) by mouth every 8 (eight) hours as needed for nausea or vomiting for up to 12 doses, Disp: 12 tablet, Rfl: 0    Current Allergies     Allergies as of 02/06/2020 - Reviewed 02/06/2020   Allergen Reaction Noted    Bee venom Anaphylaxis 08/11/2015    Eggs or egg-derived products Anaphylaxis 04/29/2019    Fluzone [flu virus vaccine] Anaphylaxis, Shortness Of Breath, Diarrhea, and Throat Swelling 10/19/2018    Iodine Tachycardia 09/16/2002    Shellfish-derived products Diarrhea and Vomiting 08/11/2015    Ciprofloxacin  06/11/2019              Past Medical History:   Diagnosis Date    Abnormal bleeding in menstrual cycle     Adenomyosis     Allergic reaction     LAST ASSESSED: 11/20/14    Allergic rhinitis     LAST ASSESSED: 11/20/14    Anxiety     Burn     Left upper, inner arm--from cooking  Almost healed    Depression     Endometriosis     Fibroid uterus     [place] (hard of hearing)     Slightly    Irregular heart beat     Irritable bowel syndrome     Migraines     MVP (mitral valve prolapse)     Pelvic pain     PONV (postoperative nausea and vomiting)     Snores     Stress incontinence     Occasional       Past Surgical History:   Procedure Laterality Date    ABDOMINAL SURGERY      laparotomy secondary to SBO age 21   [facility] APPENDECTOMY      COLONOSCOPY      DILATION AND CURETTAGE OF UTERUS      EGD      ENDOMETRIAL ABLATION      KNEE ARTHROSCOPY Right     NASAL SEPTUM SURGERY      [place] LAPAROSCOPY W TOT HYSTERECTUTERUS <=250 GRAM  W TUBE/OVARY N/A 5/13/2019    Procedure: LAP TOTAL HYSTERECTOMY, B/L SALPINGECTOMY, EXTENSIVE ADHESIOLYSIS, CYSTOSCOPY;  Surgeon: Amalia Escamilla DO;  Location: [facility];  Service: Gynecology    WISDOM TOOTH EXTRACTION         Family History   Problem Relation Age of Onset    Valvular heart disease Mother     Heart murmur Mother     Anxiety disorder Mother     Heart attack Maternal Grandmother     Hypertension Paternal Grandfather          Medications have been verified      The following portions of the patient's history were reviewed and updated as appropriate: allergies, current medications, past family history, past medical history, past social history, past surgical history and problem list     Objective   Pulse 97   Temp 98 6 °F (37 °C) (Tympanic)   Resp 17   Ht 5' 2" (1 575 m)   Wt 81 6 kg (180 lb)   SpO2 97%   BMI 32 92 kg/m²      Physical Exam     Physical Exam   Constitutional: She appears well-developed and well-nourished  No distress  Cardiovascular: Normal rate, regular rhythm, normal heart sounds and normal pulses  Pulmonary/Chest: Effort normal and breath sounds normal  No stridor  She has no decreased breath sounds  Abdominal: Soft  Normal appearance and bowel sounds are normal  There is no tenderness  No hernia         Deejay Cleverly, CRNP

## 2020-03-27 ENCOUNTER — TELEPHONE (OUTPATIENT)
Dept: FAMILY MEDICINE CLINIC | Facility: CLINIC | Age: 55
End: 2020-03-27

## 2020-03-28 ENCOUNTER — TELEMEDICINE (OUTPATIENT)
Dept: FAMILY MEDICINE CLINIC | Facility: CLINIC | Age: 55
End: 2020-03-28
Payer: COMMERCIAL

## 2020-03-28 DIAGNOSIS — J06.9 VIRAL UPPER RESPIRATORY TRACT INFECTION: Primary | ICD-10-CM

## 2020-03-28 PROCEDURE — 99213 OFFICE O/P EST LOW 20 MIN: CPT | Performed by: FAMILY MEDICINE

## 2020-03-28 NOTE — PROGRESS NOTES
Virtual Regular Visit    Problem List Items Addressed This Visit     None        1) URI: 3 day hx of fever (tmax 101,  Currently 100)  No cough  Pt is congested  No SOB  No recent travel  Patient states she does not feel sick,  Just a little "blah"  Recommend self quarantine ( for at least 1 week) , rest, fluids  Should symptoms worsen, please call Back         Reason for visit is uri sxs    Encounter provider Kalpesh Garcia DO    Provider located at [place] [id number] [facility] [address] [facility] [address] [phone]      Recent Visits  Date Type Provider Dept   03/27/20 Telephone Debo Wade 12 recent visits within past 7 days and meeting all other requirements     Today's Visits  Date Type Provider Dept   03/28/20 [place]medicine [place] [id number] [address] today's visits and meeting all other requirements     Future Appointments  Date Type Provider Dept   03/28/20 Telemedicine Kalpesh Garcia DO [facility]   Showing future appointments within next 150 days and meeting all other requirements        The patient was identified by name and date of birth  Nikita Covarrubias was informed that this is a telemedicine visit and that the visit is being conducted through [address] [place] [address] and patient was informed that this is not a secure, HIPAA-complaint platform  she agrees to proceed     My office door was closed  No one else was in the room  She acknowledged consent and understanding of privacy and security of the video platform  The patient has agreed to participate and understands they can discontinue the visit at any time  Patient is aware this is a billable service  Subjective  Nikita Covarrubias is a 62 y o  female 3 day hx of fever (tmax 101,  Currently 100)  No cough  Pt is congested  No SOB  No recent travel         Past Medical History:   Diagnosis Date    Abnormal bleeding in menstrual cycle     Adenomyosis  Allergic reaction     LAST ASSESSED: 11/20/14    Allergic rhinitis     LAST ASSESSED: 11/20/14    Anxiety     Burn     Left upper, inner arm--from cooking   Almost healed    Depression     Endometriosis     Fibroid uterus     [place] (hard of hearing[place]     Slightly    Irregular heart beat     Irritable bowel syndrome     Migraines     MVP (mitral valve prolapse)     Pelvic pain     PONV (postoperative nausea and vomiting)     Snores     Stress incontinence     Occasional       Past Surgical History:   Procedure Laterality Date    ABDOMINAL SURGERY      laparotomy secondary to SBO age 21   [facility] APPENDE[facility]OMY      COLONOSCOPY      DILATION AND CURETTAGE OF UTERUS      EGD      ENDOMETRIAL ABLATION      KNEE ARTHROSCOPY Right     NASAL SEPTUM SURGERY      [place] LAPAROSCOPY W TOT HYSTERECTUTERUS <=250 GRAM  W TUBE/OVARY N/A 5/13/2019    Procedure: LAP TOTAL HYSTERECTOMY, B/L SALPINGECTOMY, EXTENSIVE ADHESIOLYSIS, CYSTOSCOPY;  Surgeon: Geovany Moncada DO;  Location: [facility];  Service: Gynecology    WISDOM TOOTH EXTRACTION         Current Outpatient Medications   Medication Sig Dispense Refill    Cholecalciferol (VITAMIN D PO) Take 2,000 Units by mouth daily      EPINEPHrine (EPIPEN) 0 3 mg/0 3 mL SOAJ Inject 0 3 mL (0 3 mg total) into a muscle once for 1 dose 2 each 1    fluticasone (FLONASE) 50 mcg/act nasal spray USE 2 SPRAYS IN EACH NOSTRIL DAILY (Patient taking differently: USE 2 SPRAYS IN EACH NOSTRIL DAILY IN AM) 16 g 5    ibuprofen (MOTRIN) 200 mg tablet Take 3 tablets (600 mg total) by mouth every 6 (six) hours For 3 days, then as needed  0    levocetirizine (XYZAL) 5 MG tablet Take 5 mg by mouth every evening      LORazepam (ATIVAN) 0 5 mg tablet Take 0 5 mg by mouth as needed       ondansetron (ZOFRAN-ODT) 4 mg disintegrating tablet Take 1 tablet (4 mg total) by mouth every 8 (eight) hours as needed for nausea or vomiting for up to 12 doses 12 tablet 0    sertraline (ZOLOFT) 25 mg tablet Take 1 tablet (25 mg total) by mouth 2 (two) times a day (Patient taking differently: Take 50 mg by mouth daily at bedtime ) 180 tablet 4    SUMAtriptan (IMITREX) 100 mg tablet TAKE 1 TABLET AT ONSET OF MIGRAINE HEADACHE  MAY REPEAT IN 2 HOURS IF NEEDED 27 tablet 1     No current facility-administered medications for this visit  Allergies   Allergen Reactions    Bee Venom Anaphylaxis    Eggs Or Egg-Derived Products Anaphylaxis     Egg whites- tested at allergist    Fluzone [Flu Virus Vaccine] Anaphylaxis, Shortness Of Breath, Diarrhea and Throat Swelling     10/18/18 given at employer    Iodine Tachycardia     IV contrast dye caused tachycardia    Shellfish-Derived Products Diarrhea and Vomiting    Ciprofloxacin        Review of Systems   Constitutional: Negative for chills and fever  HENT: Positive for congestion and postnasal drip  Respiratory: Positive for cough  Negative for shortness of breath  Cardiovascular: Negative  Physical Exam     I spent  12 minutes with the patient during this visit

## 2020-03-30 ENCOUNTER — TELEPHONE (OUTPATIENT)
Dept: FAMILY MEDICINE CLINIC | Facility: CLINIC | Age: 55
End: 2020-03-30

## 2020-03-30 DIAGNOSIS — J01.00 ACUTE NON-RECURRENT MAXILLARY SINUSITIS: Primary | ICD-10-CM

## 2020-03-30 RX ORDER — AMOXICILLIN 875 MG/1
875 TABLET, COATED ORAL 2 TIMES DAILY
Qty: 20 TABLET | Refills: 0 | Status: SHIPPED | OUTPATIENT
Start: 2020-03-30 | End: 2020-04-09

## 2020-03-30 NOTE — TELEPHONE ENCOUNTER
Call patient  I will send a prescription for an antibiotic to [facility] for her     Call back if further problems

## 2020-03-30 NOTE — TELEPHONE ENCOUNTER
Pt had a virtual visit on Saturday with you called back this am with left side ear pain nasal congestion  Fever 100 3 for 6 days now wondering if she now has a sinus infection please advise

## 2020-06-12 ENCOUNTER — ANNUAL EXAM (OUTPATIENT)
Dept: GYNECOLOGY | Facility: CLINIC | Age: 55
End: 2020-06-12
Payer: COMMERCIAL

## 2020-06-12 VITALS
BODY MASS INDEX: 33.68 KG/M2 | HEART RATE: 71 BPM | WEIGHT: 183 LBS | HEIGHT: 62 IN | DIASTOLIC BLOOD PRESSURE: 86 MMHG | SYSTOLIC BLOOD PRESSURE: 128 MMHG

## 2020-06-12 DIAGNOSIS — Z01.419 ENCOUNTER FOR GYNECOLOGICAL EXAMINATION WITHOUT ABNORMAL FINDING: ICD-10-CM

## 2020-06-12 DIAGNOSIS — N95.2 ATROPHIC VAGINITIS: Primary | ICD-10-CM

## 2020-06-12 DIAGNOSIS — Z13.820 SCREENING FOR OSTEOPOROSIS: ICD-10-CM

## 2020-06-12 PROCEDURE — 3008F BODY MASS INDEX DOCD: CPT | Performed by: FAMILY MEDICINE

## 2020-06-12 PROCEDURE — S0612 ANNUAL GYNECOLOGICAL EXAMINA: HCPCS | Performed by: OBSTETRICS & GYNECOLOGY

## 2020-06-12 PROCEDURE — 3008F BODY MASS INDEX DOCD: CPT | Performed by: OBSTETRICS & GYNECOLOGY

## 2020-06-12 PROCEDURE — 76977 US BONE DENSITY MEASURE: CPT | Performed by: OBSTETRICS & GYNECOLOGY

## 2020-06-12 RX ORDER — ESTRADIOL 0.1 MG/G
CREAM VAGINAL
Qty: 42.5 G | Refills: 3 | Status: SHIPPED | OUTPATIENT
Start: 2020-06-12 | End: 2021-01-14

## 2020-08-07 ENCOUNTER — APPOINTMENT (OUTPATIENT)
Dept: RADIOLOGY | Facility: CLINIC | Age: 55
End: 2020-08-07
Payer: COMMERCIAL

## 2020-08-07 ENCOUNTER — OFFICE VISIT (OUTPATIENT)
Dept: URGENT CARE | Facility: CLINIC | Age: 55
End: 2020-08-07
Payer: COMMERCIAL

## 2020-08-07 VITALS
DIASTOLIC BLOOD PRESSURE: 88 MMHG | SYSTOLIC BLOOD PRESSURE: 142 MMHG | HEART RATE: 65 BPM | TEMPERATURE: 97 F | RESPIRATION RATE: 18 BRPM | OXYGEN SATURATION: 98 %

## 2020-08-07 DIAGNOSIS — S65.500A: ICD-10-CM

## 2020-08-07 DIAGNOSIS — S61.210A LACERATION OF RIGHT INDEX FINGER WITHOUT FOREIGN BODY, NAIL DAMAGE STATUS UNSPECIFIED, INITIAL ENCOUNTER: ICD-10-CM

## 2020-08-07 DIAGNOSIS — S67.190A CRUSHING INJURY OF RIGHT INDEX FINGER, INITIAL ENCOUNTER: Primary | ICD-10-CM

## 2020-08-07 PROCEDURE — G0381 LEV 2 HOSP TYPE B ED VISIT: HCPCS | Performed by: FAMILY MEDICINE

## 2020-08-07 PROCEDURE — 90471 IMMUNIZATION ADMIN: CPT | Performed by: FAMILY MEDICINE

## 2020-08-07 PROCEDURE — 90715 TDAP VACCINE 7 YRS/> IM: CPT

## 2020-08-07 PROCEDURE — 12001 RPR S/N/AX/GEN/TRNK 2.5CM/<: CPT | Performed by: PHYSICIAN ASSISTANT

## 2020-08-07 PROCEDURE — 73140 X-RAY EXAM OF FINGER(S): CPT

## 2020-08-07 NOTE — PATIENT INSTRUCTIONS
Elevate, cold compresses over dressing, Tylenol and / or Ibuprofen as needed for pain  Skin Adhesive Care   WHAT YOU NEED TO KNOW:   Skin adhesive is medical glue used to close wounds  It is a substitute for staples and stitches  Skin adhesive wound closures take less time and do not require anesthesia  You have less pain and a lower risk of infection than with staples or stitches  Skin adhesive will fall off after the wound is healed  DISCHARGE INSTRUCTIONS:   Self-care:   · Keep your wound clean and dry  for 1 to 5 days  You can shower 24 hours after the skin adhesive is applied  Lightly pat your wound dry after you shower  · Do not soak  your wound in water, such as in a bath or hot tub  · Do not scrub  your wound or pick at the adhesive  This can make your wound reopen  · Do not apply ointments  to your wound  These include antibiotic and other ointments that contain petroleum jelly  These products will remove skin adhesive and reopen your wound  Follow up with your healthcare provider as directed:  Write down your questions so you remember to ask them during your visits  Contact your healthcare provider if:   · You have a fever  · Your wound is red and warm to touch  · You have questions or concerns about your condition or care  Return to the emergency department if:   · Your wound has fluid draining from it  · Your wound opens  © 2017 [address] Information is for End User's use only and may not be sold, redistributed or otherwise used for commercial purposes  All illustrations and images included in CareNotes® are the copyrighted property of A D A M , Inc  or [employer] Luke  The above information is an  only  It is not intended as medical advice for individual conditions or treatments  Talk to your doctor, nurse or pharmacist before following any medical regimen to see if it is safe and effective for you

## 2020-08-07 NOTE — PROGRESS NOTES
[facility]    NAME: Praveen Carroll is a 60 y o  female  : 1965    MRN: [id number]  DATE: 2020  TIME: 3:16 PM    Assessment and Plan   Crushing injury of right index finger, initial encounter [S67 190A]  1  Crushing injury of right index finger, initial encounter  XR finger right second digit-index   2  Laceration of right index finger without foreign body, nail damage status unspecified, initial encounter  TDAP VACCINE GREATER THAN OR EQUAL TO 8YO IM    Splint     Nurse administered Tdap vaccine  X-ray right index finger:  No acute bony changes  Nurse applied static stop finger splint and clean dressing  Patient Instructions   Patient Instructions   Elevate, cold compresses over dressing, Tylenol and / or Ibuprofen as needed for pain  Skin Adhesive Care   WHAT YOU NEED TO KNOW:   Skin adhesive is medical glue used to close wounds  It is a substitute for staples and stitches  Skin adhesive wound closures take less time and do not require anesthesia  You have less pain and a lower risk of infection than with staples or stitches  Skin adhesive will fall off after the wound is healed  DISCHARGE INSTRUCTIONS:   Self-care:   · Keep your wound clean and dry  for 1 to 5 days  You can shower 24 hours after the skin adhesive is applied  Lightly pat your wound dry after you shower  · Do not soak  your wound in water, such as in a bath or hot tub  · Do not scrub  your wound or pick at the adhesive  This can make your wound reopen  · Do not apply ointments  to your wound  These include antibiotic and other ointments that contain petroleum jelly  These products will remove skin adhesive and reopen your wound  Follow up with your healthcare provider as directed:  Write down your questions so you remember to ask them during your visits  Contact your healthcare provider if:   · You have a fever  · Your wound is red and warm to touch       · You have questions or concerns about your condition or care  Return to the emergency department if:   · Your wound has fluid draining from it  · Your wound opens  © 2017 [address]  Information is for End User's use only and may not be sold, redistributed or otherwise used for commercial purposes  All illustrations and images included in CareNotes® are the copyrighted property of A D A M , Inc  or [employer]igo Luke  The above information is an  only  It is not intended as medical advice for individual conditions or treatments  Talk to your doctor, nurse or pharmacist before following any medical regimen to see if it is safe and effective for you  Chief Complaint     Chief Complaint   Patient presents with    Laceration     Pt closed car door on right index finger, laceration present  Needs tetanus  History of Present Illness   Tariq Sumner presents to the clinic c/o  52-year-old right-hand-dominant female comes in with injury to right index finger  She and her daughter were shopping at [employer] and she smashed her finger in the car door  Open wound that has been bleeding  She wrapped it and did some pressure  Review of Systems   Review of Systems   Constitutional: Positive for activity change  Negative for appetite change, chills and fever  Respiratory: Negative  Cardiovascular: Negative  Musculoskeletal: Positive for arthralgias  Skin: Positive for color change and wound         Current Medications     Long-Term Medications   Medication Sig Dispense Refill    EPINEPHrine (EPIPEN) 0 3 mg/0 3 mL SOAJ Inject 0 3 mL (0 3 mg total) into a muscle once for 1 dose 2 each 1    estradiol (ESTRACE) 0 1 mg/g vaginal cream 1 gram vaginally M,W,F x 3 weeks then once weekly 42 5 g 3    fluticasone (FLONASE) 50 mcg/act nasal spray USE 2 SPRAYS IN EACH NOSTRIL DAILY (Patient taking differently: USE 2 SPRAYS IN EACH NOSTRIL DAILY IN AM) 16 g 5    ibuprofen (MOTRIN) 200 mg tablet Take 3 tablets (600 mg total) by mouth every 6 (six) hours For 3 days, then as needed  0    levocetirizine (XYZAL) 5 MG tablet Take 5 mg by mouth every evening      LORazepam (ATIVAN) 0 5 mg tablet Take 0 5 mg by mouth as needed       ondansetron (ZOFRAN-ODT) 4 mg disintegrating tablet Take 1 tablet (4 mg total) by mouth every 8 (eight) hours as needed for nausea or vomiting for up to 12 doses 12 tablet 0    sertraline (ZOLOFT) 25 mg tablet Take 1 tablet (25 mg total) by mouth 2 (two) times a day (Patient taking differently: Take 50 mg by mouth daily at bedtime ) 180 tablet 4    SUMAtriptan (IMITREX) 100 mg tablet TAKE 1 TABLET AT ONSET OF MIGRAINE HEADACHE  MAY REPEAT IN 2 HOURS IF NEEDED 27 tablet 1       Current Allergies     Allergies as of 08/07/2020 - Reviewed 08/07/2020   Allergen Reaction Noted    Bee venom Anaphylaxis 08/11/2015    Eggs or egg-derived products Anaphylaxis 04/29/2019    Fluzone [flu virus vaccine] Anaphylaxis, Shortness Of Breath, Diarrhea, and Throat Swelling 10/19/2018    Iodine Tachycardia 09/16/2002    Shellfish-derived products Diarrhea and Vomiting 08/11/2015    Ciprofloxacin  06/11/2019          The following portions of the patient's history were reviewed and updated as appropriate: allergies, current medications, past family history, past medical history, past social history, past surgical history and problem list   Past Medical History:   Diagnosis Date    Abnormal bleeding in menstrual cycle     Adenomyosis     Allergic reaction     LAST ASSESSED: 11/20/14    Allergic rhinitis     LAST ASSESSED: 11/20/14    Anxiety     Burn     Left upper, inner arm--from cooking   Almost healed    Depression     Diffuse cystic mastopathy     Diffuse cystic mastopathy     Endometriosis     Fibroid uterus     [place] (hard of hearing)     Slightly    HPV (human papilloma virus) infection October 2018    Irregular heart beat     Irregular menses 2017    Irritable bowel syndrome  Migraines     MVP (mitral valve prolapse)     Ovarian cyst March 2019    Pelvic pain     PONV (postoperative nausea and vomiting)     Snores     Stress incontinence     Occasional     Past Surgical History:   Procedure Laterality Date    ABDOMINAL SURGERY      laparotomy secondary to SBO age 21   [facility] APPENDECTOMY      COLONOSCOPY      DILATION AND CURETTAGE OF UTERUS      EGD      ENDOMETRIAL ABLATION      HYSTERECTOMY  May 13, 2019    HYSTEROSCOPY  2012    I had 2 completed in 2012    KNEE ARTHROSCOPY Right     NASAL SEPTUM SURGERY      [place] LAPAROSCOPY W TOT HYSTERECTUTERUS <=250 GRAM  W TUBE/OVARY N/A 5/13/2019    Procedure: LAP TOTAL HYSTERECTOMY, B/L SALPINGECTOMY, EXTENSIVE ADHESIOLYSIS, CYSTOSCOPY;  Surgeon: Malinda Branham DO;  Location[facility];  Service: Gynecology    WISDOM TOOTH EXTRACTION       Family History   Problem Relation Age of Onset    Valvular heart disease Mother     Heart murmur Mother     Anxiety disorder Mother     Migraines Mother     Heart attack Maternal Grandmother     Heart failure Maternal Grandmother     Hypertension Paternal Grandfather     Cancer Maternal Grandfather         Prostate Cancer    No Known Problems Daughter        Objective   /88   Pulse 65   Temp (!) 97 °F (36 1 °C) (Tympanic)   Resp 18   LMP 05/08/2019   SpO2 98%   Patient's last menstrual period was 05/08/2019  Physical Exam     Physical Exam  Vitals signs and nursing note reviewed  Constitutional:       General: She is not in acute distress  Appearance: Normal appearance  She is well-developed  She is not ill-appearing, toxic-appearing or diaphoretic  Comments: Holding right hand in guarded position  Daughter accompanies  Cardiovascular:      Rate and Rhythm: Normal rate  Pulmonary:      Effort: Pulmonary effort is normal  No respiratory distress  Musculoskeletal:         General: Swelling, tenderness and signs of injury present        Comments: Right index finger:  Swelling, limited range of motion distal aspect, TTP, laceration  Skin:     General: Skin is warm  Comments: Bleeding around distal aspect right finger, index  Approx  1 cm laceration lateral aspect distal RIF  No evidence tendonopathy  Neurological:      Mental Status: She is alert and oriented to person, place, and time  Psychiatric:         Mood and Affect: Mood normal          Behavior: Behavior normal          Laceration repair    Date/Time: 8/7/2020 3:09 PM  Performed by: Georgiana Crowell PA-C  Authorized by: Georgiana Crowell PA-C   Consent: Verbal consent obtained    Consent given by: patient  Patient understanding: patient states understanding of the procedure being performed  Patient identity confirmed: verbally with patient  Body area: upper extremity  Location details: right index finger  Laceration length: 1 cm  Foreign bodies: no foreign bodies  Tendon involvement: none  Nerve involvement: none  Vascular damage: no    Sedation:  Patient sedated: no      Wound Dehiscence:  Superficial Wound Dehiscence: simple closure      Procedure Details:  Irrigation solution: tap water  Irrigation method: tap  Amount of cleaning: standard  Debridement: none  Degree of undermining: none  Skin closure: glue  Approximation: close  Approximation difficulty: simple  Dressing: splint and pressure dressing  Patient tolerance: Patient tolerated the procedure well with no immediate complications

## 2020-08-24 DIAGNOSIS — F32.9 REACTIVE DEPRESSION: ICD-10-CM

## 2020-08-24 RX ORDER — SERTRALINE HYDROCHLORIDE 25 MG/1
25 TABLET, FILM COATED ORAL 2 TIMES DAILY
Qty: 60 TABLET | Refills: 0 | Status: SHIPPED | OUTPATIENT
Start: 2020-08-24 | End: 2020-09-02 | Stop reason: SDUPTHER

## 2020-08-24 NOTE — TELEPHONE ENCOUNTER
Future appt 9/2/20 via PHYS  Verified Sertraline dose w/ pt 25 mg b i d  States obgyn orginally prescribed would like use to take over med management and will f/u about dose @ future appt

## 2020-09-02 ENCOUNTER — OFFICE VISIT (OUTPATIENT)
Dept: FAMILY MEDICINE CLINIC | Facility: CLINIC | Age: 55
End: 2020-09-02
Payer: COMMERCIAL

## 2020-09-02 VITALS
OXYGEN SATURATION: 99 % | TEMPERATURE: 97.5 F | HEART RATE: 68 BPM | WEIGHT: 188.8 LBS | HEIGHT: 62 IN | DIASTOLIC BLOOD PRESSURE: 82 MMHG | SYSTOLIC BLOOD PRESSURE: 132 MMHG | BODY MASS INDEX: 34.74 KG/M2

## 2020-09-02 DIAGNOSIS — Z00.00 ANNUAL PHYSICAL EXAM: ICD-10-CM

## 2020-09-02 DIAGNOSIS — Z13.1 SCREENING FOR DIABETES MELLITUS: ICD-10-CM

## 2020-09-02 DIAGNOSIS — Z13.0 SCREENING FOR DEFICIENCY ANEMIA: ICD-10-CM

## 2020-09-02 DIAGNOSIS — Z11.59 ENCOUNTER FOR HEPATITIS C SCREENING TEST FOR LOW RISK PATIENT: ICD-10-CM

## 2020-09-02 DIAGNOSIS — Z12.5 SCREENING FOR PROSTATE CANCER: ICD-10-CM

## 2020-09-02 DIAGNOSIS — Z13.29 SCREENING FOR THYROID DISORDER: ICD-10-CM

## 2020-09-02 DIAGNOSIS — F41.9 ANXIETY: Primary | ICD-10-CM

## 2020-09-02 DIAGNOSIS — F32.9 REACTIVE DEPRESSION: ICD-10-CM

## 2020-09-02 DIAGNOSIS — Z13.6 SCREENING FOR CARDIOVASCULAR CONDITION: ICD-10-CM

## 2020-09-02 DIAGNOSIS — Z11.4 SCREENING FOR HIV (HUMAN IMMUNODEFICIENCY VIRUS): ICD-10-CM

## 2020-09-02 PROCEDURE — 99396 PREV VISIT EST AGE 40-64: CPT | Performed by: FAMILY MEDICINE

## 2020-09-02 RX ORDER — LORAZEPAM 0.5 MG/1
0.5 TABLET ORAL AS NEEDED
Qty: 30 TABLET | Refills: 0 | Status: SHIPPED | OUTPATIENT
Start: 2020-09-02

## 2020-09-02 RX ORDER — SERTRALINE HYDROCHLORIDE 25 MG/1
25 TABLET, FILM COATED ORAL DAILY
Qty: 30 TABLET | Refills: 1 | Status: SHIPPED | OUTPATIENT
Start: 2020-09-02 | End: 2020-09-20

## 2020-09-02 NOTE — PATIENT INSTRUCTIONS
Wellness Visit for Adults   AMBULATORY CARE:   A wellness visit  is when you see your healthcare provider to get screened for health problems  You can also get advice on how to stay healthy  Write down your questions so you remember to ask them  Ask your healthcare provider how often you should have a wellness visit  What happens at a wellness visit:  Your healthcare provider will ask about your health, and your family history of health problems  This includes high blood pressure, heart disease, and cancer  He or she will ask if you have symptoms that concern you, if you smoke, and about your mood  You may also be asked about your intake of medicines, supplements, food, and alcohol  Any of the following may be done:  · Your weight  will be checked  Your height may also be checked so your body mass index (BMI) can be calculated  Your BMI shows if you are at a healthy weight  · Your blood pressure  and heart rate will be checked  Your temperature may also be checked  · Blood and urine tests  may be done  Blood tests may be done to check your cholesterol levels  Abnormal cholesterol levels increase your risk for heart disease and stroke  You may also need a blood or urine test to check for diabetes if you are at increased risk  Urine tests may be done to look for signs of an infection or kidney disease  · A physical exam  includes checking your heartbeat and lungs with a stethoscope  Your healthcare provider may also check your skin to look for sun damage  · Screening tests  may be recommended  A screening test is done to check for diseases that may not cause symptoms  The screening tests you may need depend on your age, gender, family history, and lifestyle habits  For example, colorectal screening may be recommended if you are 48years old or older  Screening tests you need if you are a woman:   · A Pap smear  is used to screen for cervical cancer   Pap smears are usually done every 3 to 5 years depending on your age  You may need them more often if you have had abnormal Pap smear test results in the past  Ask your healthcare provider how often you should have a Pap smear  · A mammogram  is an x-ray of your breasts to screen for breast cancer  Experts recommend mammograms every 2 years starting at age 48 years  You may need a mammogram at age 52 years or younger if you have an increased risk for breast cancer  Talk to your healthcare provider about when you should start having mammograms and how often you need them  Vaccines you may need:   · Get an influenza vaccine  every year  The influenza vaccine protects you from the flu  Several types of viruses cause the flu  The viruses change over time, so new vaccines are made each year  · Get a tetanus-diphtheria (Td) booster vaccine  every 10 years  This vaccine protects you against tetanus and diphtheria  Tetanus is a severe infection that may cause painful muscle spasms and lockjaw  Diphtheria is a severe bacterial infection that causes a thick covering in the back of your mouth and throat  · Get a human papillomavirus (HPV) vaccine  if you are female and aged 23 to 32 or male 23 to 24 and never received it  This vaccine protects you from HPV infection  HPV is the most common infection spread by sexual contact  HPV may also cause vaginal, penile, and anal cancers  · Get a pneumococcal vaccine  if you are aged 72 years or older  The pneumococcal vaccine is an injection given to protect you from pneumococcal disease  Pneumococcal disease is an infection caused by pneumococcal bacteria  The infection may cause pneumonia, meningitis, or an ear infection  · Get a shingles vaccine  if you are aged 61 or older, even if you have had shingles before  The shingles vaccine is an injection to protect you from the varicella-zoster virus  This is the same virus that causes chickenpox   Shingles is a painful rash that develops in people who had chickenpox or have been exposed to the virus  How to eat healthy:  My Plate is a model for planning healthy meals  It shows the types and amounts of foods that should go on your plate  Fruits and vegetables make up about half of your plate, and grains and protein make up the other half  A serving of dairy is included on the side of your plate  The amount of calories and serving sizes you need depends on your age, gender, weight, and height  Examples of healthy foods are listed below:  · Eat a variety of vegetables  such as dark green, red, and orange vegetables  You can also include canned vegetables low in sodium (salt) and frozen vegetables without added butter or sauces  · Eat a variety of fresh fruits , canned fruit in 100% juice, frozen fruit, and dried fruit  · Include whole grains  At least half of the grains you eat should be whole grains  Examples include whole-wheat bread, wheat pasta, brown rice, and whole-grain cereals such as oatmeal     · Eat a variety of protein foods such as seafood (fish and shellfish), lean meat, and poultry without skin (turkey and chicken)  Examples of lean meats include pork leg, shoulder, or tenderloin, and beef round, sirloin, tenderloin, and extra lean ground beef  Other protein foods include eggs and egg substitutes, beans, peas, soy products, nuts, and seeds  · Choose low-fat dairy products such as skim or 1% milk or low-fat yogurt, cheese, and cottage cheese  · Limit unhealthy fats  such as butter, hard margarine, and shortening  Exercise:  Exercise at least 30 minutes per day on most days of the week  Some examples of exercise include walking, biking, dancing, and swimming  You can also fit in more physical activity by taking the stairs instead of the elevator or parking farther away from stores  Include muscle strengthening activities 2 days each week  Regular exercise provides many health benefits   It helps you manage your weight, and decreases your risk for type 2 diabetes, heart disease, stroke, and high blood pressure  Exercise can also help improve your mood  Ask your healthcare provider about the best exercise plan for you  General health and safety guidelines:   · Do not smoke  Nicotine and other chemicals in cigarettes and cigars can cause lung damage  Ask your healthcare provider for information if you currently smoke and need help to quit  E-cigarettes or smokeless tobacco still contain nicotine  Talk to your healthcare provider before you use these products  · Limit alcohol  A drink of alcohol is 12 ounces of beer, 5 ounces of wine, or 1½ ounces of liquor  · Lose weight, if needed  Being overweight increases your risk of certain health conditions  These include heart disease, high blood pressure, type 2 diabetes, and certain types of cancer  · Protect your skin  Do not sunbathe or use tanning beds  Use sunscreen with a SPF 15 or higher  Apply sunscreen at least 15 minutes before you go outside  Reapply sunscreen every 2 hours  Wear protective clothing, hats, and sunglasses when you are outside  · Drive safely  Always wear your seatbelt  Make sure everyone in your car wears a seatbelt  A seatbelt can save your life if you are in an accident  Do not use your cell phone when you are driving  This could distract you and cause an accident  Pull over if you need to make a call or send a text message  · Practice safe sex  Use latex condoms if are sexually active and have more than one partner  Your healthcare provider may recommend screening tests for sexually transmitted infections (STIs)  · Wear helmets, lifejackets, and protective gear  Always wear a helmet when you ride a bike or motorcycle, go skiing, or play sports that could cause a head injury  Wear protective equipment when you play sports  Wear a lifejacket when you are on a boat or doing water sports    © 2017 [employer] Information is for End User's use only and may not be sold, redistributed or otherwise used for commercial purposes  All illustrations and images included in CareNotes® are the copyrighted property of [employer]  or [employer]  The above information is an  only  It is not intended as medical advice for individual conditions or treatments  Talk to your doctor, nurse or pharmacist before following any medical regimen to see if it is safe and effective for you  Weight Management   AMBULATORY CARE:   Why it is important to manage your weight:  Being overweight increases your risk of health conditions such as heart disease, high blood pressure, type 2 diabetes, and certain types of cancer  It can also increase your risk for osteoarthritis, sleep apnea, and other respiratory problems  Aim for a slow, steady weight loss  Even a small amount of weight loss can lower your risk of health problems  How to lose weight safely:  A safe and healthy way to lose weight is to eat fewer calories and get regular exercise  You can lose up about 1 pound a week by decreasing the number of calories you eat by 500 calories each day  You can decrease calories by eating smaller portion sizes or by cutting out high-calorie foods  Read labels to find out how many calories are in the foods you eat  You can also burn calories with exercise such as walking, swimming, or biking  You will be more likely to keep weight off if you make these changes part of your lifestyle  Healthy meal plan for weight management:  A healthy meal plan includes a variety of foods, contains fewer calories, and helps you stay healthy  A healthy meal plan includes the following:  · Eat whole-grain foods more often  A healthy meal plan should contain fiber  Fiber is the part of grains, fruits, and vegetables that is not broken down by your body  Whole-grain foods are healthy and provide extra fiber in your diet   Some examples of whole-grain foods are whole-wheat breads and pastas, oatmeal, brown rice, and bulgur  · Eat a variety of vegetables every day  Include dark, leafy greens such as spinach, kale, moustapha greens, and mustard greens  Eat yellow and orange vegetables such as carrots, sweet potatoes, and winter squash  · Eat a variety of fruits every day  Choose fresh or canned fruit (canned in its own juice or light syrup) instead of juice  Fruit juice has very little or no fiber  · Eat low-fat dairy foods  Drink fat-free (skim) milk or 1% milk  Eat fat-free yogurt and low-fat cottage cheese  Try low-fat cheeses such as mozzarella and other reduced-fat cheeses  · Choose meat and other protein foods that are low in fat  Choose beans or other legumes such as split peas or lentils  Choose fish, skinless poultry (chicken or turkey), or lean cuts of red meat (beef or pork)  Before you cook meat or poultry, cut off any visible fat  · Use less fat and oil  Try baking foods instead of frying them  Add less fat, such as margarine, sour cream, regular salad dressing and mayonnaise to foods  Eat fewer high-fat foods  Some examples of high-fat foods include french fries, doughnuts, ice cream, and cakes  · Eat fewer sweets  Limit foods and drinks that are high in sugar  This includes candy, cookies, regular soda, and sweetened drinks  Ways to decrease calories:   · Eat smaller portions  ¨ Use a small plate with smaller servings  ¨ Do not eat second helpings  ¨ When you eat at a restaurant, ask for a box and place half of your meal in the box before you eat  ¨ Share an entrée with someone else  · Replace high-calorie snacks with healthy, low-calorie snacks  ¨ Choose fresh fruit, vegetables, fat-free rice cakes, or air-popped popcorn instead of potato chips, nuts, or chocolate  ¨ Choose water or calorie-free drinks instead of soda or sweetened drinks  · Eat regular meals  Skipping meals can lead to overeating later in the day   Eat a healthy snack in place of a meal if you do not have time to eat a regular meal      · Do not shop for groceries when you are hungry  You may be more likely to make unhealthy food choices  Take a grocery list of healthy foods and shop after you have eaten  Exercise:  Exercise at least 30 minutes per day on most days of the week  Some examples of exercise include walking, biking, dancing, and swimming  You can also fit in more physical activity by taking the stairs instead of the elevator or parking farther away from stores  Ask your healthcare provider about the best exercise plan for you  Other things to consider as you try to lose weight:   · Be aware of situations that may give you the urge to overeat, such as eating while watching television  Find ways to avoid these situations  For example, read a book, go for a walk, or do crafts  · Meet with a weight loss support group or friends who are also trying to lose weight  This may help you stay motivated to continue working on your weight loss goals  © 2017 [employer] Information is for End User's use only and may not be sold, redistributed or otherwise used for commercial purposes  All illustrations and images included in CareNotes® are the copyrighted property of [employer]  or [employer]  The above information is an  only  It is not intended as medical advice for individual conditions or treatments  Talk to your doctor, nurse or pharmacist before following any medical regimen to see if it is safe and effective for you
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
## 2020-09-09 ENCOUNTER — AMB VIDEO VISIT (OUTPATIENT)
Dept: OTHER | Facility: HOSPITAL | Age: 55
End: 2020-09-09

## 2020-09-09 PROCEDURE — EVISIT: Performed by: FAMILY MEDICINE

## 2020-09-10 NOTE — CARE ANYWHERE EVISITS
Visit Summary for Praful Parker - Gender: Female - Date of Birth: [id number]  Date: [id number] - Duration: 3 minutes  Patient: Praful Keith  Provider: Merritt Alfonso    Patient Contact Information  Address  [address] [facility] [address]      Visit Topics  Ear and chest congestion  [Added By: Self - 2020-09-09]    Triage Questions   What is your current physical address in the event of a medical emergency? Answer []  Are you allergic to any medications? Answer []  Are you now or could you be pregnant? Answer []  Do you have any immune system compromise or chronic lung   disease? Answer []  Do you have any vulnerable family members in the home (infant, pregnant, cancer, elderly)? Answer []     Conversation Transcripts  [0A][0A] [Notification] You are connected with Merritt Alfonso, Family Physician [0A][Notification] Praful Parker is located in [place]  [0A][Notification] Praful Parker has shared health history  Nadia Reed  [0A][Notification] Merritt Alfonso has added   a diagnosis/procedure code  [0A][Notification] Merritt Alfonso has added a prescription  [0A]    Diagnosis  Acute sinusitis, unspecified    Procedures    Medications Prescribed    Augmentin  Dose : 1 tablet  Route : oral  Frequency : every 12 hours  Refills : 0  Instructions to the Pharmacist : Substitutions allowed      Provider Notes  [0A][0A] [0A]We strongly encourage you to share the following record of today's visit with your primary care physician  [0A][0A][0A][0A]Contact phone number:[0A][0A][0A][0A]Mode of Communication: Genia Maria [0A][0A]HPI: The patient has been having   sinus pain, post nasal drip, discolored mucous for more than a week  No fever, no swollen glands or sore throat  There is a moderate cough but no shortness of breath  The patient has been achy and tired    [0A][0A][0A][0A]PMH:   Anxiety/depression[0A][0A]PSH: Hysterectomy, bowel obstructions[0A][0A]Smoking HX: Nonsmoker[0A][0A]Meds: xyzal, flonase, mucinexSERTRALINE HCL 25 MG [id number] [0A]Allergies: Cipro[0A][0A][0A][0A]PE: [0A][0A]Gen: Patient is well   appearing and in no acute distress   [0A][0A]Eyes: Normal appearing[0A][0A]Nose: Congested[0A][0A]Sinuses: Sinus tenderness bilaterally[0A][0A]Pharynx: Normal appearing tonsils and posterior pharynx[0A][0A]Resp: No respiratory   distress[0A][0A][0A][0A]Assessment: Sinusitis   Diagnosis Code: acute sinusitis NOS J01 90[0A][0A]  [0A][0A]Plan:  [0A][0A]1  Medication as described below  [0A][0A]2  Discussed options and precautions[0A][0A]3  Recommend nasal saline, Neti Pot, plain   Mucinex[0A][0A]4  Sleeping with head/chest elevated can help with sinus drainage[0A][0A][0A][0A]Antibiotic indication: Conitnued symptoms over 1 week[0A][0A]Antibiotic choice: Augmentin 875mg 1 tab twice daily for 7 days[0A][0A][0A][0A]Follow   up:[0A][0A]1  Follow up in 5-7 days if not improving  Discussed precautions  [0A][0A]2  If there are any questions or problems with the prescription, call [phone] anytime for assistance  [0A][0A]3  Please re-connect for another online visit or see   an in-person provider should your symptoms worsen or not improving 5-7days  [0A][0A]4  Taking a probiotic (either in pill form or by eating yogurt that contains probiotics) while using antibiotics can help prevent some of the troublesome side effects   that antibiotics can sometimes cause [0A][0A]5  Please print a copy of this note and send it to your regular doctor, or take it to your next visit so it may be included in your medical record   [0A][0A][0A][0A]Patient voiced understanding and agrees to   plan [0A][0A][0A][0A]Please see your PCP on an annual basis[0A]    Electronically signed by: Jm Cheng(NPI [id number])

## 2020-09-14 ENCOUNTER — AMB VIDEO VISIT (OUTPATIENT)
Dept: OTHER | Facility: HOSPITAL | Age: 55
End: 2020-09-14

## 2020-09-14 PROCEDURE — EVISIT: Performed by: FAMILY MEDICINE

## 2020-09-15 NOTE — CARE ANYWHERE EVISITS
Visit Summary for Franki Andersen - Gender: Female - Date of Birth: [id number]  Date: [id number] - Duration: 3 minutes  Patient: Franki Andersen  Provider: Jamie De Los Santos    Patient Contact Information  Address  [address] [facility]; [address]      Visit Topics  Still not feeling well since last Wednesdayâs visit  Chest and ear congestion  [Added By: Self - 2020-09-14]    Triage Questions   What is your current physical address in the event of a medical emergency? Answer []  Are you allergic to any medications? Answer []  Are you now or could you be pregnant? Answer []  Do you have any immune system compromise or chronic lung   disease? Answer []  Do you have any vulnerable family members in the home (infant, pregnant, cancer, elderly)? Answer []     Conversation Transcripts  [0A][0A] [Notification] You are connected with Jamie De Los Santos, Family Physician [0A][Notification] Franki Andersen is located in [place]  [0A][Notification] Franki Andersen has shared health history  Andreea Deras  [0A][Notification] Jamie De Los Santos has added   a diagnosis/procedure code  [0A][Notification] Jamie De Los Santos has added a prescription [0A][Notification] Jamie De Los Santos has added a prescription [0A][Notification] Jamie De Los Santos has added a prescription  [0A]    Diagnosis  Acute sinusitis, unspecified    Procedures    Medications Prescribed    prednisone  Dose : 2 tablets  Route : oral  Frequency : every day  Refills : 0  Instructions to the Pharmacist : Substitutions allowed    doxycycline monohydrate  Dose : 1 tablet  Route : oral  Frequency : 2 times a day  Refills : 0  Instructions to the Pharmacist : Substitutions allowed    benzonatate  Dose : 1 capsule  Route : oral  Frequency : 3 times a day  Until directed to stop       Refills : 0  Instructions to the Pharmacist : Substitutions allowed      Provider Notes  [0A][0A] We strongly encourage you to share the following record of today's visit with your primary care physician  Contact phone number:Mode of Communication: VideoHPI: The patient has been having sinus pain, post nasal drip, ear pain discolored mucous   for more than a week  No fever, no swollen glands or sore throat  There is a moderate cough but no shortness of breath keeping awake at night  The patient has been achy and tired  PMH: Anxiety/depressionPSH: Hysterectomy, bowel obstructionsSmoking HX:   NonsmokerMeds: xyzal, flonase, mucinexSERTRALINE HCL 25 MG TABLETaugmentinESTRADIOL Allergies: CiproPE:Gen: Patient is well appearing and in no acute distressEyes: Normal appearingNose: CongestedSinuses: Sinus tenderness bilaterallyPharynx: Normal   appearing tonsils and posterior pharynxResp: No respiratory distressAssessment: Sinusitis Diagnosis Code: acute sinusitis NOS J01 90Plan:1  Medication as described below  2  Discussed options and precautions3  Recommend nasal saline, Neti Pot, plain   Mucinex4  Sleeping with head/chest elevated can help with sinus drainageAntibiotic indication: Conitnued symptoms over 2 weeksAntibiotic choice: Doxycyline 100mg 1 tab twice daily for 7 days, tessalon pearls 200mg 1 tab 3 times daily, prednisone 20mg 2   tabs daily for  5 daysFollow up:1  Follow up in 5-7 days if not improving  Discussed precautions  2  If there are any questions or problems with the prescription, call [phone] anytime for assistance  3  Please re-connect for another online visit or   see an in-person provider should your symptoms worsen or not improving 5-7days  4  Taking a probiotic (either in pill form or by eating yogurt that contains probiotics) while using antibiotics can help prevent some of the troublesome side effects that   antibiotics can sometimes cause  5  Please print a copy of this note and send it to your regular doctor, or take it to your next visit so it may be included in your medical record  Patient voiced understanding and agrees to plan  Please see your PCP on an   annual basis[0A]    Electronically signed by: Rob Cheng(NPI [id number])

## 2020-09-17 ENCOUNTER — TELEMEDICINE (OUTPATIENT)
Dept: FAMILY MEDICINE CLINIC | Facility: CLINIC | Age: 55
End: 2020-09-17
Payer: COMMERCIAL

## 2020-09-17 DIAGNOSIS — Z20.828 EXPOSURE TO SARS-ASSOCIATED CORONAVIRUS: ICD-10-CM

## 2020-09-17 DIAGNOSIS — Z20.828 EXPOSURE TO SARS-ASSOCIATED CORONAVIRUS: Primary | ICD-10-CM

## 2020-09-17 PROCEDURE — 99214 OFFICE O/P EST MOD 30 MIN: CPT | Performed by: FAMILY MEDICINE

## 2020-09-17 PROCEDURE — U0003 INFECTIOUS AGENT DETECTION BY NUCLEIC ACID (DNA OR RNA); SEVERE ACUTE RESPIRATORY SYNDROME CORONAVIRUS 2 (SARS-COV-2) (CORONAVIRUS DISEASE [COVID-19]), AMPLIFIED PROBE TECHNIQUE, MAKING USE OF HIGH THROUGHPUT TECHNOLOGIES AS DESCRIBED BY CMS-2020-01-R: HCPCS | Performed by: FAMILY MEDICINE
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
## 2020-09-18 DIAGNOSIS — F32.9 REACTIVE DEPRESSION: ICD-10-CM

## 2020-09-18 LAB — SARS-COV-2 RNA SPEC QL NAA+PROBE: NOT DETECTED

## 2020-09-20 RX ORDER — SERTRALINE HYDROCHLORIDE 25 MG/1
TABLET, FILM COATED ORAL
Qty: 60 TABLET | Refills: 0 | Status: SHIPPED | OUTPATIENT
Start: 2020-09-20 | End: 2020-12-14 | Stop reason: SDUPTHER

## 2020-09-21 ENCOUNTER — OFFICE VISIT (OUTPATIENT)
Dept: FAMILY MEDICINE CLINIC | Facility: CLINIC | Age: 55
End: 2020-09-21
Payer: COMMERCIAL

## 2020-09-21 VITALS
TEMPERATURE: 97 F | OXYGEN SATURATION: 98 % | HEIGHT: 62 IN | HEART RATE: 70 BPM | DIASTOLIC BLOOD PRESSURE: 80 MMHG | SYSTOLIC BLOOD PRESSURE: 130 MMHG | WEIGHT: 188.4 LBS | BODY MASS INDEX: 34.67 KG/M2

## 2020-09-21 DIAGNOSIS — J40 BRONCHITIS: Primary | ICD-10-CM

## 2020-09-21 PROCEDURE — 1036F TOBACCO NON-USER: CPT | Performed by: FAMILY MEDICINE

## 2020-09-21 PROCEDURE — 99213 OFFICE O/P EST LOW 20 MIN: CPT | Performed by: FAMILY MEDICINE

## 2020-09-21 RX ORDER — AZITHROMYCIN 250 MG/1
TABLET, FILM COATED ORAL
Qty: 6 TABLET | Refills: 0 | Status: SHIPPED | OUTPATIENT
Start: 2020-09-21 | End: 2020-09-26

## 2020-09-21 NOTE — PROGRESS NOTES
[facility]      NAME: Lynn Vargas  AGE: 60 y o  SEX: female  : 1965   MRN: [id number]    DATE: 2020  TIME: 6:10 PM    Assessment and Plan     Problem List Items Addressed This Visit     None      Visit Diagnoses     Bronchitis    -  Primary    Relevant Medications    azithromycin (ZITHROMAX) 250 mg tablet    fluticasone-salmeterol (Advair Diskus) 250-50 mcg/dose inhaler    Other Relevant Orders    XR chest pa & lateral              Return to office in:   P r n  Chief Complaint     Chief Complaint   Patient presents with    Follow-up     cough,        History of Present Illness     Patient complains of greater than 2 weeks of upper respiratory symptoms with cough chest congestion and occasional wheezing  Was initially treated with amoxicillin but before she finished that course she was seen via telemedicine and was switched to doxycycline and prednisone  She was unable to tell tolerate the prednisone and discontinued it but completed the course of doxycycline today  She still producing clear to yellow sputum on a daily basis with a persistent cough and some occasional wheezing with exertion  She denies fever chills or shortness of breath  She had a COVID test several days ago which was negative  The following portions of the patient's history were reviewed and updated as appropriate: allergies, current medications, past family history, past medical history, past social history, past surgical history and problem list     Review of Systems   Review of Systems   Constitutional: Negative  HENT: Positive for congestion and postnasal drip  Respiratory: Positive for cough and wheezing  Cardiovascular: Negative  Gastrointestinal: Negative  Genitourinary: Negative  Musculoskeletal: Negative  Psychiatric/Behavioral: Negative          Active Problem List     Patient Active Problem List   Diagnosis    Anxiety    Migraine headache    Depression    Vitamin D deficiency    Chronic abdominal pain    Adenomyosis       Objective   /80 (BP Location: Left arm, Patient Position: Sitting, Cuff Size: Adult)   Pulse 70   Temp (!) 97 °F (36 1 °C) (Tympanic)   Ht 5' 1 5" (1 562 m)   Wt 85 5 kg (188 lb 6 4 oz)   LMP 05/08/2019   SpO2 98%   Breastfeeding No   BMI 35 02 kg/m²     Physical Exam  Vitals signs and nursing note reviewed  Constitutional:       General: She is not in acute distress  Appearance: She is well-developed  She is not diaphoretic  HENT:      Head: Normocephalic and atraumatic  Eyes:      General:         Right eye: No discharge  Conjunctiva/sclera: Conjunctivae normal       Pupils: Pupils are equal, round, and reactive to light  Neck:      Musculoskeletal: Normal range of motion  Thyroid: No thyromegaly  Cardiovascular:      Rate and Rhythm: Normal rate and regular rhythm  Pulmonary:      Effort: Pulmonary effort is normal  No respiratory distress  Breath sounds: Rhonchi present  Lymphadenopathy:      Cervical: No cervical adenopathy  Skin:     General: Skin is warm and dry  Neurological:      Mental Status: She is alert and oriented to person, place, and time  Psychiatric:         Behavior: Behavior normal          Thought Content:  Thought content normal          Judgment: Judgment normal            Current Medications     Current Outpatient Medications:     Cholecalciferol (VITAMIN D PO), Take 2,000 Units by mouth daily, Disp: , Rfl:     EPINEPHrine (EPIPEN) 0 3 mg/0 3 mL SOAJ, Inject 0 3 mL (0 3 mg total) into a muscle once for 1 dose, Disp: 2 each, Rfl: 1    estradiol (ESTRACE) 0 1 mg/g vaginal cream, 1 gram vaginally M,W,F x 3 weeks then once weekly, Disp: 42 5 g, Rfl: 3    fluticasone (FLONASE) 50 mcg/act nasal spray, USE 2 SPRAYS IN EACH NOSTRIL DAILY (Patient taking differently: USE 2 SPRAYS IN EACH NOSTRIL DAILY IN AM), Disp: 16 g, Rfl: 5    ibuprofen (MOTRIN) 200 mg tablet, Take 3 tablets (600 mg total) by mouth every 6 (six) hours For 3 days, then as needed, Disp: , Rfl: 0    levocetirizine (XYZAL) 5 MG tablet, Take 5 mg by mouth every evening, Disp: , Rfl:     LORazepam (ATIVAN) 0 5 mg tablet, Take 1 tablet (0 5 mg total) by mouth as needed (prn), Disp: 30 tablet, Rfl: 0    ondansetron (ZOFRAN-ODT) 4 mg disintegrating tablet, Take 1 tablet (4 mg total) by mouth every 8 (eight) hours as needed for nausea or vomiting for up to 12 doses, Disp: 12 tablet, Rfl: 0    sertraline (ZOLOFT) 25 mg tablet, TAKE 1 TABLET BY MOUTH TWICE A DAY, Disp: 60 tablet, Rfl: 0    SUMAtriptan (IMITREX) 100 mg tablet, TAKE 1 TABLET AT ONSET OF MIGRAINE HEADACHE   MAY REPEAT IN 2 HOURS IF NEEDED, Disp: 27 tablet, Rfl: 1    azithromycin (ZITHROMAX) 250 mg tablet, Take 2 tablets on day 1 then 1 tablet daily until finished, Disp: 6 tablet, Rfl: 0    fluticasone-salmeterol (Advair Diskus) 250-50 mcg/dose inhaler, Inhale 1 puff 2 (two) times a day Rinse mouth after use , Disp: 3 Inhaler, Rfl: 3    Health Maintenance     Health Maintenance   Topic Date Due    Hepatitis C Screening  1965    HIV Screening  07/19/1978    Influenza Vaccine  07/01/2020    MAMMOGRAM  02/27/2021    BMI: Followup Plan  09/02/2021    Annual Physical  09/02/2021    BMI: Adult  09/21/2021    Colonoscopy Surveillance  03/19/2024    Colorectal Cancer Screening  03/19/2029    DTaP,Tdap,and Td Vaccines (3 - Td) 08/07/2030    Pneumococcal Vaccine: Pediatrics (0 to 5 Years) and At-Risk Patients (6 to 59 Years)  Aged Out    HIB Vaccine  Aged Out    Hepatitis B Vaccine  Aged Out    IPV Vaccine  Aged Out    Hepatitis A Vaccine  Aged Out    Meningococcal ACWY Vaccine  Aged Out    HPV Vaccine  Aged [employer] History   Administered Date(s) Administered    INFLUENZA 01/01/2007, 11/01/2015, 10/18/2018    Influenza Quadrivalent, 6-35 Months IM 11/01/2015    Influenza TIV (IM) 11/01/2016    Tdap 07/02/2008, 08/07/2020 Tony Cristina [facility]

## 2020-09-24 ENCOUNTER — APPOINTMENT (OUTPATIENT)
Dept: RADIOLOGY | Facility: CLINIC | Age: 55
End: 2020-09-24
Payer: COMMERCIAL

## 2020-09-24 DIAGNOSIS — J40 BRONCHITIS: ICD-10-CM

## 2020-09-24 PROCEDURE — 71046 X-RAY EXAM CHEST 2 VIEWS: CPT

## 2020-12-14 DIAGNOSIS — F32.9 REACTIVE DEPRESSION: ICD-10-CM

## 2020-12-14 RX ORDER — SERTRALINE HYDROCHLORIDE 25 MG/1
25 TABLET, FILM COATED ORAL 2 TIMES DAILY
Qty: 60 TABLET | Refills: 2 | Status: SHIPPED | OUTPATIENT
Start: 2020-12-14 | End: 2021-01-12 | Stop reason: SDUPTHER

## 2020-12-21 LAB
ALBUMIN SERPL-MCNC: 4.1 G/DL (ref 3.6–5.1)
ALBUMIN/GLOB SERPL: 1.6 (CALC) (ref 1–2.5)
ALP SERPL-CCNC: 47 U/L (ref 37–153)
ALT SERPL-CCNC: 23 U/L (ref 6–29)
AST SERPL-CCNC: 16 U/L (ref 10–35)
BASOPHILS # BLD AUTO: 22 CELLS/UL (ref 0–200)
BASOPHILS NFR BLD AUTO: 0.4 %
BILIRUB SERPL-MCNC: 1 MG/DL (ref 0.2–1.2)
BUN SERPL-MCNC: 25 MG/DL (ref 7–25)
BUN/CREAT SERPL: NORMAL (CALC) (ref 6–22)
CALCIUM SERPL-MCNC: 9 MG/DL (ref 8.6–10.4)
CHLORIDE SERPL-SCNC: 105 MMOL/L (ref 98–110)
CHOLEST SERPL-MCNC: 164 MG/DL
CHOLEST/HDLC SERPL: 2.7 (CALC)
CO2 SERPL-SCNC: 25 MMOL/L (ref 20–32)
CREAT SERPL-MCNC: 0.6 MG/DL (ref 0.5–1.05)
EOSINOPHIL # BLD AUTO: 32 CELLS/UL (ref 15–500)
EOSINOPHIL NFR BLD AUTO: 0.6 %
ERYTHROCYTE [DISTWIDTH] IN BLOOD BY AUTOMATED COUNT: 13.1 % (ref 11–15)
GLOBULIN SER CALC-MCNC: 2.6 G/DL (CALC) (ref 1.9–3.7)
GLUCOSE SERPL-MCNC: 90 MG/DL (ref 65–99)
HCT VFR BLD AUTO: 39.5 % (ref 35–45)
HCV AB S/CO SERPL IA: 0.01
HCV AB SERPL QL IA: NORMAL
HDLC SERPL-MCNC: 60 MG/DL
HGB BLD-MCNC: 13.2 G/DL (ref 11.7–15.5)
HIV 1+2 AB+HIV1 P24 AG SERPL QL IA: NORMAL
LDLC SERPL CALC-MCNC: 90 MG/DL (CALC)
LYMPHOCYTES # BLD AUTO: 1949 CELLS/UL (ref 850–3900)
LYMPHOCYTES NFR BLD AUTO: 36.1 %
MCH RBC QN AUTO: 29.5 PG (ref 27–33)
MCHC RBC AUTO-ENTMCNC: 33.4 G/DL (ref 32–36)
MCV RBC AUTO: 88.2 FL (ref 80–100)
MONOCYTES # BLD AUTO: 513 CELLS/UL (ref 200–950)
MONOCYTES NFR BLD AUTO: 9.5 %
NEUTROPHILS # BLD AUTO: 2884 CELLS/UL (ref 1500–7800)
NEUTROPHILS NFR BLD AUTO: 53.4 %
NONHDLC SERPL-MCNC: 104 MG/DL (CALC)
PLATELET # BLD AUTO: 279 THOUSAND/UL (ref 140–400)
PMV BLD REES-ECKER: 11.9 FL (ref 7.5–12.5)
POTASSIUM SERPL-SCNC: 4.2 MMOL/L (ref 3.5–5.3)
PROT SERPL-MCNC: 6.7 G/DL (ref 6.1–8.1)
RBC # BLD AUTO: 4.48 MILLION/UL (ref 3.8–5.1)
SL AMB EGFR AFRICAN AMERICAN: 117 ML/MIN/1.73M2
SL AMB EGFR NON AFRICAN AMERICAN: 101 ML/MIN/1.73M2
SODIUM SERPL-SCNC: 138 MMOL/L (ref 135–146)
TRIGL SERPL-MCNC: 54 MG/DL
TSH SERPL-ACNC: 1.94 MIU/L (ref 0.4–4.5)
WBC # BLD AUTO: 5.4 THOUSAND/UL (ref 3.8–10.8)

## 2021-01-12 DIAGNOSIS — F32.9 REACTIVE DEPRESSION: ICD-10-CM

## 2021-01-12 RX ORDER — SERTRALINE HYDROCHLORIDE 25 MG/1
25 TABLET, FILM COATED ORAL 2 TIMES DAILY
Qty: 60 TABLET | Refills: 0 | Status: SHIPPED | OUTPATIENT
Start: 2021-01-12 | End: 2021-04-14 | Stop reason: SDUPTHER

## 2021-01-14 ENCOUNTER — OFFICE VISIT (OUTPATIENT)
Dept: URGENT CARE | Facility: MEDICAL CENTER | Age: 56
End: 2021-01-14
Payer: COMMERCIAL

## 2021-01-14 VITALS
TEMPERATURE: 98 F | BODY MASS INDEX: 34.41 KG/M2 | HEART RATE: 86 BPM | OXYGEN SATURATION: 96 % | HEIGHT: 62 IN | WEIGHT: 187 LBS | RESPIRATION RATE: 18 BRPM

## 2021-01-14 DIAGNOSIS — B34.9 VIRAL SYNDROME: Primary | ICD-10-CM

## 2021-01-14 DIAGNOSIS — Z20.822 ENCOUNTER FOR LABORATORY TESTING FOR COVID-19 VIRUS: ICD-10-CM

## 2021-01-14 PROCEDURE — U0005 INFEC AGEN DETEC AMPLI PROBE: HCPCS | Performed by: PHYSICIAN ASSISTANT

## 2021-01-14 PROCEDURE — 99213 OFFICE O/P EST LOW 20 MIN: CPT | Performed by: PHYSICIAN ASSISTANT

## 2021-01-14 PROCEDURE — U0003 INFECTIOUS AGENT DETECTION BY NUCLEIC ACID (DNA OR RNA); SEVERE ACUTE RESPIRATORY SYNDROME CORONAVIRUS 2 (SARS-COV-2) (CORONAVIRUS DISEASE [COVID-19]), AMPLIFIED PROBE TECHNIQUE, MAKING USE OF HIGH THROUGHPUT TECHNOLOGIES AS DESCRIBED BY CMS-2020-01-R: HCPCS | Performed by: PHYSICIAN ASSISTANT
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
## 2021-01-14 NOTE — PATIENT INSTRUCTIONS
[address]    Your healthcare provider and/or public health staff have evaluated you and have determined that you do not need to remain in the hospital at this time  At this time you can be isolated at home where you will be monitored by staff from your local or state health department  You should carefully follow the prevention and isolation steps below until a healthcare provider or local or state health department says that you can return to your normal activities  Stay home except to get medical care    People who are mildly ill with COVID-19 are able to isolate at home during their illness  You should restrict activities outside your home, except for getting medical care  Do not go to work, school, or public areas  Avoid using public transportation, ride-sharing, or taxis  Separate yourself from other people and animals in your home    People: As much as possible, you should stay in a specific room and away from other people in your home  Also, you should use a separate bathroom, if available  Animals: You should restrict contact with pets and other animals while you are sick with COVID-19, just like you would around other people  Although there have not been reports of pets or other animals becoming sick with COVID-19, it is still recommended that people sick with COVID-19 limit contact with animals until more information is known about the virus  When possible, have another member of your household care for your animals while you are sick  If you are sick with COVID-19, avoid contact with your pet, including petting, snuggling, being kissed or licked, and sharing food  If you must care for your pet or be around animals while you are sick, wash your hands before and after you interact with pets and wear a facemask  See COVID-19 and Animals for more information      Call ahead before visiting your doctor    If you have a medical appointment, call the healthcare provider and tell them that you have or may have COVID-19  This will help the healthcare providers office take steps to keep other people from getting infected or exposed  Wear a facemask    You should wear a facemask when you are around other people (e g , sharing a room or vehicle) or pets and before you enter a healthcare providers office  If you are not able to wear a facemask (for example, because it causes trouble breathing), then people who live with you should not stay in the same room with you, or they should wear a facemask if they enter your room  Cover your coughs and sneezes    Cover your mouth and nose with a tissue when you cough or sneeze  Throw used tissues in a lined trash can  Immediately wash your hands with soap and water for at least 20 seconds or, if soap and water are not available, clean your hands with an alcohol-based hand  that contains at least 60% alcohol  Clean your hands often    Wash your hands often with soap and water for at least 20 seconds, especially after blowing your nose, coughing, or sneezing; going to the bathroom; and before eating or preparing food  If soap and water are not readily available, use an alcohol-based hand  with at least 60% alcohol, covering all surfaces of your hands and rubbing them together until they feel dry  Soap and water are the best option if hands are visibly dirty  Avoid touching your eyes, nose, and mouth with unwashed hands  Avoid sharing personal household items    You should not share dishes, drinking glasses, cups, eating utensils, towels, or bedding with other people or pets in your home  After using these items, they should be washed thoroughly with soap and water  Clean all high-touch surfaces everyday    High touch surfaces include counters, tabletops, doorknobs, bathroom fixtures, toilets, phones, keyboards, tablets, and bedside tables  Also, clean any surfaces that may have blood, stool, or body fluids on them   Use a household cleaning spray or wipe, according to the label instructions  Labels contain instructions for safe and effective use of the cleaning product including precautions you should take when applying the product, such as wearing gloves and making sure you have good ventilation during use of the product  Monitor your symptoms    Seek prompt medical attention if your illness is worsening (e g , difficulty breathing)  Before seeking care, call your healthcare provider and tell them that you have, or are being evaluated for, COVID-19  Put on a facemask before you enter the facility  These steps will help the healthcare providers office to keep other people in the office or waiting room from getting infected or exposed  Ask your healthcare provider to call the local or [facility] health department  Persons who are placed under active monitoring or facilitated self-monitoring should follow instructions provided by their local health department or occupational health professionals, as appropriate  If you have a medical emergency and need to call 911, notify the dispatch personnel that you have, or are being evaluated for COVID-19  If possible, put on a facemask before emergency medical services arrive      Discontinuing home isolation    Patients with confirmed COVID-19 should remain under home isolation precautions until the following conditions are met:   - They have had no fever for at least 24 hours (that is one full day of no fever without the use medicine that reduces fevers)  AND  - other symptoms have improved (for example, when their cough or shortness of breath have improved)  AND  - If had mild or moderate illness, at least 10 days have passed since their symptoms first appeared or if severe illness (needed oxygen) or immunosuppressed, at least 20 days have passed since symptoms first appeared  Patients with confirmed COVID-19 should also notify close contacts (including their workplace) and ask that they self-quarantine  Currently, close contact is defined as being within 6 feet for 15 minutes or more from the period 24 hours starting 48 hours before symptom onset to the time at which the patient went into isolation  Close contacts of patients diagnosed with COVID-19 should be instructed by the patient to self-quarantine for 14 days from the last time of their last contact with the patient  Source: [employer]  Viral Syndrome   WHAT YOU NEED TO KNOW:   Viral syndrome is a term used for symptoms of an infection caused by a virus  Viruses are spread easily from person to person through the air and on shared items  DISCHARGE INSTRUCTIONS:   Call your local emergency number (911 in the [address] [place] [address],[address]) or have someone else call if:   · You have a seizure  · You cannot be woken  · You have chest pain or trouble breathing  Return to the emergency department if:   · You have a stiff neck, a bad headache, and sensitivity to light  · You feel weak, dizzy, or confused  · You stop urinating or urinate a lot less than usual     · You cough up blood or thick yellow or green mucus  · You have severe abdominal pain or your abdomen is larger than usual     Call your doctor if:   · Your symptoms do not get better with treatment or get worse after 3 days  · You have a rash or ear pain  · You have burning when you urinate  · You have questions or concerns about your condition or care  Medicines: You may  need any of the following:  · Acetaminophen  decreases pain and fever  It is available without a doctor's order  Ask how much to take and how often to take it  Follow directions  Read the labels of all other medicines you are using to see if they also contain acetaminophen, or ask your doctor or pharmacist  Acetaminophen can cause liver damage if not taken correctly  Do not use more than 4 grams (4,000 milligrams) total of acetaminophen in one day  · NSAIDs , such as ibuprofen, help decrease swelling, pain, and fever  NSAIDs can cause stomach bleeding or kidney problems in certain people  If you take blood thinner medicine, always ask your healthcare provider if NSAIDs are safe for you  Always read the medicine label and follow directions  · Cold medicine  helps decrease swelling, control a cough, and relieve chest or nasal congestion  · Saline nasal spray  helps decrease nasal congestion  · Take your medicine as directed  Contact your healthcare provider if you think your medicine is not helping or if you have side effects  Tell him of her if you are allergic to any medicine  Keep a list of the medicines, vitamins, and herbs you take  Include the amounts, and when and why you take them  Bring the list or the pill bottles to follow-up visits  Carry your medicine list with you in case of an emergency  Manage your symptoms:   · Drink liquids as directed to prevent dehydration  Ask how much liquid to drink each day and which liquids are best for you  Ask if you should drink an oral rehydration solution (ORS)  An ORS has the right amounts of water, salts, and sugar you need to replace body fluids  This may help prevent dehydration caused by vomiting or diarrhea  Do not drink liquids with caffeine  Liquids with caffeine can make dehydration worse  · Get plenty of rest to help your body heal   Take naps throughout the day  Ask your healthcare provider when you can return to work and your normal activities  · Use a cool mist humidifier to help you breathe easier  Ask your healthcare provider how to use a cool mist humidifier  · Eat honey or use cough drops for a sore throat  Cough drops are available without a doctor's order  Follow directions for taking cough drops  · Do not smoke or be close to anyone who is smoking  Nicotine and other chemicals in cigarettes and cigars can cause lung damage  Smoking can also delay healing   Ask your healthcare provider for information if you currently smoke and need help to quit  E-cigarettes or smokeless tobacco still contain nicotine  Talk to your healthcare provider before you use these products  Prevent the spread of germs:       · Wash your hands often  Wash your hands several times each day  Wash after you use the bathroom, change a child's diaper, and before you prepare or eat food  Use soap and water every time  Rub your soapy hands together, lacing your fingers  Wash the front and back of your hands, and in between your fingers  Use the fingers of one hand to scrub under the fingernails of the other hand  Wash for at least 20 seconds  Rinse with warm, running water for several seconds  Then dry your hands with a clean towel or paper towel  Use hand  that contains alcohol if soap and water are not available  Do not touch your eyes, nose, or mouth without washing your hands first          · Cover a sneeze or cough  Use a tissue that covers your mouth and nose  Throw the tissue away in a trash can right away  Use the bend of your arm if a tissue is not available  Wash your hands well with soap and water or use a hand   · Stay away from others while you are sick  Avoid crowds as much as possible  · Ask about vaccines you may need  Talk to your healthcare provider about your vaccine history  He or she will tell you which vaccines you need, and when to get them  ? Get the influenza (flu) vaccine as soon as recommended each year  The flu vaccine is available starting in September or October  Flu viruses change, so it is important to get a flu vaccine every year  ? Get the pneumonia vaccine if recommended  This vaccine is usually recommended every 5 years  Your provider will tell you when to get this vaccine, if needed  Follow up with your doctor as directed:  Write down your questions so you remember to ask them during your visits    © [address] [employer] is for End User's use only and may not be sold, redistributed or otherwise used for commercial purposes  All illustrations and images included in CareNotes® are the copyrighted property of A D [employer] M [employer]  or [employer] Bunch  The above information is an  only  It is not intended as medical advice for individual conditions or treatments  Talk to your doctor, nurse or pharmacist before following any medical regimen to see if it is safe and effective for you

## 2021-01-16 LAB — SARS-COV-2 N GENE RESP QL NAA+PROBE: POSITIVE

## 2021-01-19 ENCOUNTER — HOSPITAL ENCOUNTER (EMERGENCY)
Facility: HOSPITAL | Age: 56
Discharge: HOME | End: 2021-01-19
Attending: EMERGENCY MEDICINE | Admitting: EMERGENCY MEDICINE
Payer: COMMERCIAL

## 2021-01-19 ENCOUNTER — APPOINTMENT (EMERGENCY)
Dept: RADIOLOGY | Facility: HOSPITAL | Age: 56
End: 2021-01-19
Payer: COMMERCIAL

## 2021-01-19 ENCOUNTER — TELEPHONE (OUTPATIENT)
Dept: FAMILY MEDICINE CLINIC | Facility: CLINIC | Age: 56
End: 2021-01-19

## 2021-01-19 VITALS
WEIGHT: 190.48 LBS | RESPIRATION RATE: 18 BRPM | SYSTOLIC BLOOD PRESSURE: 145 MMHG | TEMPERATURE: 98.2 F | HEART RATE: 70 BPM | DIASTOLIC BLOOD PRESSURE: 68 MMHG | BODY MASS INDEX: 34.84 KG/M2 | OXYGEN SATURATION: 98 %

## 2021-01-19 DIAGNOSIS — U07.1 COVID-19 VIRUS INFECTION: Primary | ICD-10-CM

## 2021-01-19 DIAGNOSIS — R07.89 ATYPICAL CHEST PAIN: ICD-10-CM

## 2021-01-19 DIAGNOSIS — R06.00 DYSPNEA: ICD-10-CM

## 2021-01-19 LAB
ANION GAP SERPL CALCULATED.3IONS-SCNC: 11 MMOL/L (ref 4–13)
BASOPHILS # BLD AUTO: 0.02 THOUSANDS/ΜL (ref 0–0.1)
BASOPHILS NFR BLD AUTO: 0 % (ref 0–1)
BUN SERPL-MCNC: 15 MG/DL (ref 5–25)
CALCIUM SERPL-MCNC: 9.5 MG/DL (ref 8.3–10.1)
CHLORIDE SERPL-SCNC: 104 MMOL/L (ref 100–108)
CO2 SERPL-SCNC: 22 MMOL/L (ref 21–32)
CREAT SERPL-MCNC: 0.67 MG/DL (ref 0.6–1.3)
EOSINOPHIL # BLD AUTO: 0.04 THOUSAND/ΜL (ref 0–0.61)
EOSINOPHIL NFR BLD AUTO: 1 % (ref 0–6)
ERYTHROCYTE [DISTWIDTH] IN BLOOD BY AUTOMATED COUNT: 13.7 % (ref 11.6–15.1)
GFR SERPL CREATININE-BSD FRML MDRD: 98 ML/MIN/1.73SQ M
GLUCOSE SERPL-MCNC: 89 MG/DL (ref 65–140)
HCT VFR BLD AUTO: 41.1 % (ref 34.8–46.1)
HGB BLD-MCNC: 13.9 G/DL (ref 11.5–15.4)
IMM GRANULOCYTES # BLD AUTO: 0.02 THOUSAND/UL (ref 0–0.2)
IMM GRANULOCYTES NFR BLD AUTO: 0 % (ref 0–2)
LYMPHOCYTES # BLD AUTO: 2.07 THOUSANDS/ΜL (ref 0.6–4.47)
LYMPHOCYTES NFR BLD AUTO: 28 % (ref 14–44)
MCH RBC QN AUTO: 29.6 PG (ref 26.8–34.3)
MCHC RBC AUTO-ENTMCNC: 33.8 G/DL (ref 31.4–37.4)
MCV RBC AUTO: 87 FL (ref 82–98)
MONOCYTES # BLD AUTO: 0.62 THOUSAND/ΜL (ref 0.17–1.22)
MONOCYTES NFR BLD AUTO: 8 % (ref 4–12)
NEUTROPHILS # BLD AUTO: 4.65 THOUSANDS/ΜL (ref 1.85–7.62)
NEUTS SEG NFR BLD AUTO: 63 % (ref 43–75)
NRBC BLD AUTO-RTO: 0 /100 WBCS
PLATELET # BLD AUTO: 294 THOUSANDS/UL (ref 149–390)
PMV BLD AUTO: 11 FL (ref 8.9–12.7)
POTASSIUM SERPL-SCNC: 3.7 MMOL/L (ref 3.5–5.3)
RBC # BLD AUTO: 4.7 MILLION/UL (ref 3.81–5.12)
SODIUM SERPL-SCNC: 137 MMOL/L (ref 136–145)
TROPONIN I SERPL-MCNC: <0.02 NG/ML
WBC # BLD AUTO: 7.42 THOUSAND/UL (ref 4.31–10.16)

## 2021-01-19 PROCEDURE — 99285 EMERGENCY DEPT VISIT HI MDM: CPT | Performed by: EMERGENCY MEDICINE

## 2021-01-19 PROCEDURE — 84484 ASSAY OF TROPONIN QUANT: CPT | Performed by: EMERGENCY MEDICINE

## 2021-01-19 PROCEDURE — 85025 COMPLETE CBC W/AUTO DIFF WBC: CPT | Performed by: EMERGENCY MEDICINE

## 2021-01-19 PROCEDURE — 99285 EMERGENCY DEPT VISIT HI MDM: CPT

## 2021-01-19 PROCEDURE — 36415 COLL VENOUS BLD VENIPUNCTURE: CPT | Performed by: EMERGENCY MEDICINE

## 2021-01-19 PROCEDURE — 71045 X-RAY EXAM CHEST 1 VIEW: CPT

## 2021-01-19 PROCEDURE — 80048 BASIC METABOLIC PNL TOTAL CA: CPT | Performed by: EMERGENCY MEDICINE

## 2021-01-19 PROCEDURE — 93005 ELECTROCARDIOGRAM TRACING: CPT | Performed by: PHYSICIAN ASSISTANT

## 2021-01-19 NOTE — DISCHARGE INSTRUCTIONS
POSSIBLE COVID-19    You should take the following over the counter medications:  1 multivitamin tablet daily  Vitamin C: 1000 mg twice a day  Vitamin D3: 2000 IU daily    DO NOT go into a store to get these medications  Order them for curbside pickup, have someone else get them for you, or order them online  Monitor your symptoms    Seek prompt medical attention if your illness is worsening (e g , difficulty breathing)  Before seeking care, call your healthcare provider and tell them that you have, or are being evaluated for, COVID-19  Put on a facemask before you enter the facility  These steps will help the healthcare providers office to keep other people in the office or waiting room from getting infected or exposed  Ask your healthcare provider to call the local or [facility] health department  Persons who are placed under active monitoring or facilitated self-monitoring should follow instructions provided by their local health department or occupational health professionals, as appropriate  If you have a medical emergency and need to call 911, notify the dispatch personnel that you have, or are being evaluated for COVID-19  If possible, put on a facemask before emergency medical services arrive  Notify your close contacts and let them know they will need to quarantine at home  This includes your family  members  Quarantine should ideally be for 14 days from the last time they had contact with you; however, for  individuals who do not develop symptoms, quarantine can end after Day 10 without testing or after Day 7  if a specimen collected after Day 5 tests negative on a diagnostic test (i e , PCR, antigen)  Close contacts  should monitor their health for any symptoms for the full 14 days after exposure  The quarantine for family members who cannot separate from the infected case begins after the cases  infectious period ends   If the contact does not develop symptoms, quarantine can be as short as 17 days  (with a negative test as described above) or 20 days if no testing is performed  Keep a phone nearby so that you can answer the call and participate fully in your confidential case investigation  for contact tracing  If you have symptoms, self-isolate in your home until each of the following conditions are met:  1  It has been at least ten days since your symptoms first appeared AND  2  It has been at least one day since you have not had a fever (without using fever-reducing medications) and  your respiratory symptoms are improving (e g , cough, shortness of breath)  After consultation with infection control experts, some people with severe illness may need to isolate for up to  20 days after symptom onset  If your symptoms get worse or if you require hospitalization, notify your healthcare provider immediately  If you do not need hospitalization, continue to self-isolate at home  RESOURCES FOR MORE INFORMATION  For more information, visit Jessa borrero  aspx  The latest information on the coronavirus in the [place]  and worldwide can be found on the [facility] website

## 2021-01-19 NOTE — TELEPHONE ENCOUNTER
Pt lmom that she is covid positive coming out of ER with sob  She wants to know if she is a candidate for additional treatment  Please advise

## 2021-01-19 NOTE — TELEPHONE ENCOUNTER
Please let patient know that based on current treatment guidelines, she would not be a candidate for any other treatments at this time  We would just have her continue with supportive treatments at home, Tylenol, Advil, cough medications and cold medications that are available over-the-counter
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
## 2021-01-20 LAB
ATRIAL RATE: 78 BPM
P AXIS: 70 DEGREES
PR INTERVAL: 138 MS
QRS AXIS: 62 DEGREES
QRSD INTERVAL: 82 MS
QT INTERVAL: 356 MS
QTC INTERVAL: 410 MS
T WAVE AXIS: 55 DEGREES
VENTRICULAR RATE: 80 BPM

## 2021-01-20 PROCEDURE — 93010 ELECTROCARDIOGRAM REPORT: CPT | Performed by: INTERNAL MEDICINE

## 2021-04-13 DIAGNOSIS — Z23 ENCOUNTER FOR IMMUNIZATION: ICD-10-CM

## 2021-04-14 DIAGNOSIS — F32.9 REACTIVE DEPRESSION: ICD-10-CM

## 2021-04-14 RX ORDER — SERTRALINE HYDROCHLORIDE 25 MG/1
25 TABLET, FILM COATED ORAL 2 TIMES DAILY
Qty: 180 TABLET | Refills: 1 | Status: SHIPPED | OUTPATIENT
Start: 2021-04-14 | End: 2021-10-11

## 2021-04-22 ENCOUNTER — IMMUNIZATIONS (OUTPATIENT)
Dept: FAMILY MEDICINE CLINIC | Facility: HOSPITAL | Age: 56
End: 2021-04-22

## 2021-04-22 DIAGNOSIS — Z23 ENCOUNTER FOR IMMUNIZATION: Primary | ICD-10-CM

## 2021-04-22 PROCEDURE — 0011A SARS-COV-2 / COVID-19 MRNA VACCINE (MODERNA) 100 MCG: CPT

## 2021-04-22 PROCEDURE — 91301 SARS-COV-2 / COVID-19 MRNA VACCINE (MODERNA) 100 MCG: CPT

## 2021-05-27 ENCOUNTER — IMMUNIZATIONS (OUTPATIENT)
Dept: FAMILY MEDICINE CLINIC | Facility: HOSPITAL | Age: 56
End: 2021-05-27

## 2021-05-27 DIAGNOSIS — Z23 ENCOUNTER FOR IMMUNIZATION: Primary | ICD-10-CM

## 2021-05-27 PROCEDURE — 91301 SARS-COV-2 / COVID-19 MRNA VACCINE (MODERNA) 100 MCG: CPT

## 2021-05-27 PROCEDURE — 0012A SARS-COV-2 / COVID-19 MRNA VACCINE (MODERNA) 100 MCG: CPT

## 2021-08-26 ENCOUNTER — ANNUAL EXAM (OUTPATIENT)
Dept: GYNECOLOGY | Facility: CLINIC | Age: 56
End: 2021-08-26
Payer: COMMERCIAL

## 2021-08-26 VITALS
HEIGHT: 62 IN | HEART RATE: 72 BPM | SYSTOLIC BLOOD PRESSURE: 120 MMHG | DIASTOLIC BLOOD PRESSURE: 78 MMHG | WEIGHT: 191 LBS | BODY MASS INDEX: 35.15 KG/M2

## 2021-08-26 DIAGNOSIS — Z12.31 ENCOUNTER FOR SCREENING MAMMOGRAM FOR MALIGNANT NEOPLASM OF BREAST: ICD-10-CM

## 2021-08-26 DIAGNOSIS — N95.2 ATROPHIC VAGINITIS: ICD-10-CM

## 2021-08-26 DIAGNOSIS — Z13.820 SCREENING FOR OSTEOPOROSIS: ICD-10-CM

## 2021-08-26 DIAGNOSIS — Z01.419 ENCOUNTER FOR GYNECOLOGICAL EXAMINATION WITHOUT ABNORMAL FINDING: Primary | ICD-10-CM

## 2021-08-26 PROCEDURE — S0612 ANNUAL GYNECOLOGICAL EXAMINA: HCPCS | Performed by: OBSTETRICS & GYNECOLOGY

## 2021-08-26 PROCEDURE — 76977 US BONE DENSITY MEASURE: CPT | Performed by: OBSTETRICS & GYNECOLOGY

## 2021-08-26 PROCEDURE — 81002 URINALYSIS NONAUTO W/O SCOPE: CPT | Performed by: OBSTETRICS & GYNECOLOGY

## 2021-08-26 NOTE — PROGRESS NOTES
Assessment/Plan:         Diagnoses and all orders for this visit:    Encounter for gynecological examination without abnormal finding    Atrophic vaginitis: opts to follow    Screening for osteoporosis: heel scan: -0 7        Subjective:      Patient ID: Jake Michel is a 60 y o  female  HPI patient presents for annual examination  She is status post TLH with bilateral salpingectomy and lysis of extensive adhesions in May of 2019  She denies any vaginal irritation, burning, bleeding  She does have brownish discharge  She denies dyspareunia  Denies any dysuria, hematuria urgency or urinary incontinence  No GI complaints  Colonoscopy 2019  Polyps identified  Advised to repeat in 3 years  The following portions of the patient's history were reviewed and updated as appropriate:   She  has a past medical history of Abnormal bleeding in menstrual cycle, Adenomyosis, Allergic reaction, Allergic rhinitis, Anxiety, Burn, Depression, Diffuse cystic mastopathy, Diffuse cystic mastopathy, Endometriosis, Fibroid uterus, [place] (hard of hearing), HPV (human papilloma virus) infection (October 2018), Irregular heart beat, Irregular menses (2017), Irritable bowel syndrome, Migraines, MVP (mitral valve prolapse), Ovarian cyst (March 2019), Pelvic pain, PONV (postoperative nausea and vomiting), Snores, and Stress incontinence  She   Patient Active Problem List    Diagnosis Date Noted    Adenomyosis 04/24/2019    Chronic abdominal pain 04/03/2018    Depression 10/25/2017    Anxiety 05/19/2015    Migraine headache 05/17/2015    Vitamin D deficiency 05/30/2014     She  has a past surgical history that includes Nasal septum surgery; Endometrial ablation; Appendectomy; Abdominal surgery; Knee arthroscopy (Right); [place] tooth extraction; EGD; Colonoscopy; Dilation and curettage of uterus; pr laparoscopy w tot hysterectuterus <=250 gram  w tube/ovary (N/A, 5/13/2019);  Hysterectomy (May 13, 2019); and Hysteroscopy (2012)  Her family history includes Anxiety disorder in her mother; Cancer in her maternal grandfather; Heart attack in her maternal grandmother; Heart failure in her maternal grandmother; Heart murmur in her mother; Hypertension in her paternal grandfather; Migraines in her mother; No Known Problems in her daughter; Valvular heart disease in her mother  She  reports that she has quit smoking  Her smoking use included cigarettes  She has a 2 50 pack-year smoking history  She has never used smokeless tobacco  She reports current alcohol use  She reports that she does not use drugs  Current Outpatient Medications   Medication Sig Dispense Refill    Ascorbic Acid (VITAMIN C PO) Take by mouth      Cholecalciferol (VITAMIN D PO) Take 2,000 Units by mouth daily      levocetirizine (XYZAL) 5 MG tablet Take 5 mg by mouth every evening      LORazepam (ATIVAN) 0 5 mg tablet Take 1 tablet (0 5 mg total) by mouth as needed (prn) 30 tablet 0    sertraline (ZOLOFT) 25 mg tablet Take 1 tablet (25 mg total) by mouth 2 (two) times a day 180 tablet 1    SUMAtriptan (IMITREX) 100 mg tablet TAKE 1 TABLET AT ONSET OF MIGRAINE HEADACHE  MAY REPEAT IN 2 HOURS IF NEEDED 27 tablet 1    EPINEPHrine (EPIPEN) 0 3 mg/0 3 mL SOAJ Inject 0 3 mL (0 3 mg total) into a muscle once for 1 dose 2 each 1     No current facility-administered medications for this visit  Current Outpatient Medications on File Prior to Visit   Medication Sig    Ascorbic Acid (VITAMIN C PO) Take by mouth    Cholecalciferol (VITAMIN D PO) Take 2,000 Units by mouth daily    levocetirizine (XYZAL) 5 MG tablet Take 5 mg by mouth every evening    LORazepam (ATIVAN) 0 5 mg tablet Take 1 tablet (0 5 mg total) by mouth as needed (prn)    sertraline (ZOLOFT) 25 mg tablet Take 1 tablet (25 mg total) by mouth 2 (two) times a day    SUMAtriptan (IMITREX) 100 mg tablet TAKE 1 TABLET AT ONSET OF MIGRAINE HEADACHE   MAY REPEAT IN 2 HOURS IF NEEDED    EPINEPHrine (EPIPEN) 0 3 mg/0 3 mL SOAJ Inject 0 3 mL (0 3 mg total) into a muscle once for 1 dose     No current facility-administered medications on file prior to visit  She is allergic to bee venom, eggs or egg-derived products - food allergy, fluzone [flu virus vaccine], iodine - food allergy, shellfish-derived products - food allergy, and ciprofloxacin       Review of Systems   Constitutional: Negative  HENT: Negative for sore throat and trouble swallowing  Gastrointestinal: Negative  Genitourinary: Negative  Objective:      /78 (BP Location: Left arm, Patient Position: Sitting, Cuff Size: Standard)   Pulse 72   Ht 5' 2" (1 575 m)   Wt 86 6 kg (191 lb)   LMP 05/08/2019   BMI 34 93 kg/m²          Physical Exam  Vitals reviewed  Constitutional:       Appearance: Normal appearance  Cardiovascular:      Rate and Rhythm: Normal rate and regular rhythm  Pulses: Normal pulses  Heart sounds: Normal heart sounds  No murmur heard  Pulmonary:      Effort: Pulmonary effort is normal  No respiratory distress  Breath sounds: Normal breath sounds  Chest:      Breasts:         Right: No swelling, bleeding, inverted nipple, mass, nipple discharge, skin change or tenderness  Left: No swelling, bleeding, inverted nipple, mass, nipple discharge, skin change or tenderness  Abdominal:      General: There is no distension  Palpations: Abdomen is soft  There is no mass  Tenderness: There is no abdominal tenderness  There is no guarding or rebound  Hernia: No hernia is present  There is no hernia in the left inguinal area or right inguinal area  Genitourinary:     General: Normal vulva  Labia:         Right: No rash, tenderness or lesion  Left: No rash, tenderness or lesion  Vagina: Normal       Uterus: Absent  Adnexa:         Right: No mass, tenderness or fullness  Left: No mass, tenderness or fullness       Musculoskeletal: Cervical back: Normal range of motion and neck supple  No tenderness  Lymphadenopathy:      Cervical: No cervical adenopathy  Upper Body:      Right upper body: No supraclavicular, axillary or pectoral adenopathy  Left upper body: No supraclavicular, axillary or pectoral adenopathy  Lower Body: No right inguinal adenopathy  No left inguinal adenopathy  Neurological:      Mental Status: She is alert         UA : negative

## 2021-09-09 ENCOUNTER — DOCUMENTATION (OUTPATIENT)
Dept: FAMILY MEDICINE CLINIC | Facility: CLINIC | Age: 56
End: 2021-09-09

## 2021-09-09 ENCOUNTER — OFFICE VISIT (OUTPATIENT)
Dept: FAMILY MEDICINE CLINIC | Facility: CLINIC | Age: 56
End: 2021-09-09
Payer: COMMERCIAL

## 2021-09-09 VITALS
BODY MASS INDEX: 35.59 KG/M2 | OXYGEN SATURATION: 100 % | HEART RATE: 61 BPM | TEMPERATURE: 97.6 F | HEIGHT: 62 IN | WEIGHT: 193.4 LBS | DIASTOLIC BLOOD PRESSURE: 88 MMHG | SYSTOLIC BLOOD PRESSURE: 126 MMHG

## 2021-09-09 DIAGNOSIS — Z13.1 SCREENING FOR DIABETES MELLITUS: ICD-10-CM

## 2021-09-09 DIAGNOSIS — Z23 NEED FOR SHINGLES VACCINE: ICD-10-CM

## 2021-09-09 DIAGNOSIS — Z00.00 ANNUAL PHYSICAL EXAM: Primary | ICD-10-CM

## 2021-09-09 DIAGNOSIS — Z13.6 SCREENING FOR CARDIOVASCULAR CONDITION: ICD-10-CM

## 2021-09-09 DIAGNOSIS — Z13.29 SCREENING FOR THYROID DISORDER: ICD-10-CM

## 2021-09-09 DIAGNOSIS — Z13.220 SCREENING FOR LIPID DISORDERS: ICD-10-CM

## 2021-09-09 PROCEDURE — 3008F BODY MASS INDEX DOCD: CPT | Performed by: NURSE PRACTITIONER

## 2021-09-09 PROCEDURE — 3725F SCREEN DEPRESSION PERFORMED: CPT | Performed by: NURSE PRACTITIONER

## 2021-09-09 PROCEDURE — 90750 HZV VACC RECOMBINANT IM: CPT | Performed by: NURSE PRACTITIONER

## 2021-09-09 PROCEDURE — 90471 IMMUNIZATION ADMIN: CPT | Performed by: NURSE PRACTITIONER

## 2021-09-09 PROCEDURE — 99396 PREV VISIT EST AGE 40-64: CPT | Performed by: NURSE PRACTITIONER

## 2021-09-09 PROCEDURE — 1036F TOBACCO NON-USER: CPT | Performed by: NURSE PRACTITIONER

## 2021-09-09 NOTE — PROGRESS NOTES
Patient came in today for an appointment with Estephanie Porter  First shingrix vaccine given  Second dose vial wasn't kept aside as we have in stock  (Discuss on 9/7/2021 meeting)
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
## 2021-09-13 NOTE — PATIENT INSTRUCTIONS

## 2021-09-14 ENCOUNTER — TELEPHONE (OUTPATIENT)
Dept: FAMILY MEDICINE CLINIC | Facility: CLINIC | Age: 56
End: 2021-09-14

## 2021-09-14 NOTE — TELEPHONE ENCOUNTER
Yes, although most common reaction is arm tenderness, these can be potential side effects of Shingrix as well  She may take Tylenol or ibuprofen for comfort  Usually resolve on their own in a few days  However, I would recommend evaluation if her symptoms do not resolve in the next day or 2   As it could also be a viral illness as well

## 2021-09-14 NOTE — TELEPHONE ENCOUNTER
Patient informed  She states she is feeling better now than she was this morning, but still not feeling well    Advised to take Advil or Tylenol as needed and call if symptoms persist

## 2021-09-14 NOTE — TELEPHONE ENCOUNTER
Voicemail from patient stating since Shingrix vaccine 9/9/21 she has not been feeling well  She had a headache since Friday, but today has migraine with vomiting, body aches and chills  Could this still be from the vaccine?

## 2021-10-11 DIAGNOSIS — F32.9 REACTIVE DEPRESSION: ICD-10-CM

## 2021-10-11 RX ORDER — SERTRALINE HYDROCHLORIDE 25 MG/1
TABLET, FILM COATED ORAL
Qty: 180 TABLET | Refills: 1 | Status: SHIPPED | OUTPATIENT
Start: 2021-10-11 | End: 2022-04-11

## 2021-10-25 ENCOUNTER — CONSULT (OUTPATIENT)
Dept: BARIATRICS | Facility: CLINIC | Age: 56
End: 2021-10-25
Payer: COMMERCIAL

## 2021-10-25 VITALS
DIASTOLIC BLOOD PRESSURE: 66 MMHG | HEART RATE: 55 BPM | RESPIRATION RATE: 16 BRPM | HEIGHT: 62 IN | WEIGHT: 190.5 LBS | SYSTOLIC BLOOD PRESSURE: 124 MMHG | TEMPERATURE: 97.7 F | BODY MASS INDEX: 35.06 KG/M2

## 2021-10-25 DIAGNOSIS — R63.5 ABNORMAL WEIGHT GAIN: ICD-10-CM

## 2021-10-25 DIAGNOSIS — G43.909 MIGRAINE HEADACHE: ICD-10-CM

## 2021-10-25 DIAGNOSIS — F41.9 ANXIETY: ICD-10-CM

## 2021-10-25 DIAGNOSIS — E66.9 OBESITY, CLASS II, BMI 35-39.9: Primary | ICD-10-CM

## 2021-10-25 DIAGNOSIS — E55.9 VITAMIN D DEFICIENCY: ICD-10-CM

## 2021-10-25 DIAGNOSIS — F32.A DEPRESSION: ICD-10-CM

## 2021-10-25 PROBLEM — M17.11 OSTEOARTHRITIS OF RIGHT KNEE: Status: ACTIVE | Noted: 2020-12-09

## 2021-10-25 PROBLEM — M25.561 PAIN IN RIGHT KNEE: Status: ACTIVE | Noted: 2020-12-09

## 2021-10-25 PROBLEM — M25.569 KNEE PAIN: Status: ACTIVE | Noted: 2021-10-25

## 2021-10-25 PROBLEM — E66.812 OBESITY, CLASS II, BMI 35-39.9: Status: ACTIVE | Noted: 2021-10-25

## 2021-10-25 PROCEDURE — 99214 OFFICE O/P EST MOD 30 MIN: CPT | Performed by: FAMILY MEDICINE

## 2021-10-25 PROCEDURE — 1036F TOBACCO NON-USER: CPT | Performed by: FAMILY MEDICINE

## 2021-10-25 PROCEDURE — 3008F BODY MASS INDEX DOCD: CPT | Performed by: FAMILY MEDICINE

## 2021-10-25 RX ORDER — FLUTICASONE PROPIONATE 50 MCG
1 SPRAY, SUSPENSION (ML) NASAL DAILY
COMMUNITY
Start: 2021-10-11

## 2021-10-25 RX ORDER — UREA 10 %
500 LOTION (ML) TOPICAL 2 TIMES DAILY
COMMUNITY

## 2021-10-25 RX ORDER — VITAMIN E 268 MG
400 CAPSULE ORAL DAILY
COMMUNITY

## 2021-11-04 ENCOUNTER — TELEPHONE (OUTPATIENT)
Dept: BARIATRICS | Facility: CLINIC | Age: 56
End: 2021-11-04

## 2021-11-04 ENCOUNTER — CLINICAL SUPPORT (OUTPATIENT)
Dept: FAMILY MEDICINE CLINIC | Facility: CLINIC | Age: 56
End: 2021-11-04
Payer: COMMERCIAL

## 2021-11-04 DIAGNOSIS — Z23 ENCOUNTER FOR IMMUNIZATION: Primary | ICD-10-CM

## 2021-11-04 PROCEDURE — 90471 IMMUNIZATION ADMIN: CPT | Performed by: FAMILY MEDICINE

## 2021-11-04 PROCEDURE — 90682 RIV4 VACC RECOMBINANT DNA IM: CPT | Performed by: FAMILY MEDICINE

## 2021-11-26 DIAGNOSIS — G43.909 MIGRAINE WITHOUT STATUS MIGRAINOSUS, NOT INTRACTABLE, UNSPECIFIED MIGRAINE TYPE: ICD-10-CM

## 2021-11-26 RX ORDER — SUMATRIPTAN 100 MG/1
100 TABLET, FILM COATED ORAL EVERY 2 HOUR PRN
Qty: 18 TABLET | Refills: 1 | Status: SHIPPED | OUTPATIENT
Start: 2021-11-26

## 2022-01-24 ENCOUNTER — CLINICAL SUPPORT (OUTPATIENT)
Dept: FAMILY MEDICINE CLINIC | Facility: CLINIC | Age: 57
End: 2022-01-24
Payer: COMMERCIAL

## 2022-01-24 DIAGNOSIS — Z23 NEED FOR VACCINATION: Primary | ICD-10-CM

## 2022-01-24 PROCEDURE — 90471 IMMUNIZATION ADMIN: CPT | Performed by: FAMILY MEDICINE

## 2022-01-24 PROCEDURE — 90750 HZV VACC RECOMBINANT IM: CPT | Performed by: FAMILY MEDICINE

## 2022-04-12 DIAGNOSIS — N95.2 ATROPHIC VAGINITIS: Primary | ICD-10-CM

## 2022-04-13 RX ORDER — ESTRADIOL 0.1 MG/G
1 CREAM VAGINAL WEEKLY
Qty: 42.5 G | Refills: 2 | Status: SHIPPED | OUTPATIENT
Start: 2022-04-13

## 2022-04-20 ENCOUNTER — OFFICE VISIT (OUTPATIENT)
Dept: GYNECOLOGY | Facility: CLINIC | Age: 57
End: 2022-04-20
Payer: COMMERCIAL

## 2022-04-20 VITALS
DIASTOLIC BLOOD PRESSURE: 72 MMHG | WEIGHT: 192 LBS | HEART RATE: 73 BPM | BODY MASS INDEX: 35.33 KG/M2 | SYSTOLIC BLOOD PRESSURE: 122 MMHG | HEIGHT: 62 IN

## 2022-04-20 DIAGNOSIS — R31.9 HEMATURIA OF UNDIAGNOSED CAUSE: Primary | ICD-10-CM

## 2022-04-20 PROCEDURE — 81002 URINALYSIS NONAUTO W/O SCOPE: CPT | Performed by: OBSTETRICS & GYNECOLOGY

## 2022-04-20 PROCEDURE — 99212 OFFICE O/P EST SF 10 MIN: CPT | Performed by: OBSTETRICS & GYNECOLOGY

## 2022-04-20 PROCEDURE — 1036F TOBACCO NON-USER: CPT | Performed by: OBSTETRICS & GYNECOLOGY

## 2022-04-20 PROCEDURE — 3008F BODY MASS INDEX DOCD: CPT | Performed by: OBSTETRICS & GYNECOLOGY

## 2022-04-20 NOTE — PROGRESS NOTES
Assessment/Plan:         Diagnoses and all orders for this visit:    Hematuria of undiagnosed cause; now resolved  Suspect passed stone  Check xray abd/pelvis        Subjective:      Patient ID: Joel Sherman is a 60 y o  female  HPI 3 days ago while voiding she patient stated that she passed a blood clot  This is associated with lower right flank pain  The hematuria has resolved  She continued to have some mild lower back discomfort  Patient is status post total laparoscopic hysterectomy with bilateral salpingectomy and lysis of adhesions in May of 2019, patient denies any dysuria, increased frequency of urination or fever  She has some mild lower quadrant suprapubic discomfort  No nausea, vomiting or diarrhea or constipation  The following portions of the patient's history were reviewed and updated as appropriate:   She  has a past medical history of Abnormal bleeding in menstrual cycle, Adenomyosis, Allergic reaction, Allergic rhinitis, Anxiety, Burn, Depression, Diffuse cystic mastopathy, Diffuse cystic mastopathy, Endometriosis, Fibroid uterus, [place] (hard of hearing), HPV (human papilloma virus) infection (October 2018), Irregular heart beat, Irregular menses (2017), Irritable bowel syndrome, Migraines, MVP (mitral valve prolapse), Ovarian cyst (March 2019), Pelvic pain, PONV (postoperative nausea and vomiting), Snores, and Stress incontinence    She   Patient Active Problem List    Diagnosis Date Noted    Knee pain 10/25/2021    Obesity, Class II, BMI 35-39 9 10/25/2021    BMI 35 0-35 9,adult 10/25/2021    Osteoarthritis of right knee 12/09/2020    Pain in right knee 12/09/2020    Adenomyosis 04/24/2019    Chronic abdominal pain 04/03/2018    Depression 10/25/2017    Encounter for routine gynecological examination 08/11/2015    Anxiety 05/19/2015    Migraine headache 05/17/2015    Vitamin D deficiency 05/30/2014     She  has a past surgical history that includes Nasal septum surgery; Endometrial ablation; Appendectomy; Abdominal surgery; Knee arthroscopy (Right); [place] tooth extraction; EGD; Colonoscopy; Dilation and curettage of uterus; pr laparoscopy w tot hysterectuterus <=250 gram  w tube/ovary (N/A, 5/13/2019); Hysterectomy (May 13, 2019); and Hysteroscopy (2012)  Her family history includes Anxiety disorder in her mother; Cancer in her maternal grandfather; Heart attack in her maternal grandmother; Heart failure in her maternal grandmother; Heart murmur in her mother; Hypertension in her paternal grandfather; Migraines in her mother; No Known Problems in her daughter; Valvular heart disease in her mother  She  reports that she has quit smoking  Her smoking use included cigarettes  She has a 2 50 pack-year smoking history  She has never used smokeless tobacco  She reports current alcohol use  She reports that she does not use drugs    Current Outpatient Medications   Medication Sig Dispense Refill    Ascorbic Acid (VITAMIN C PO) Take by mouth      calcium-vitamin D (OSCAL) 250-125 MG-UNIT per tablet Take 1 tablet by mouth daily      Cholecalciferol (VITAMIN D PO) Take 2,000 Units by mouth daily      estradiol (ESTRACE VAGINAL) 0 1 mg/g vaginal cream Insert 1 g into the vagina once a week Insert 1 gm 3x a week for 2 weeks then 1gm weekly 42 5 g 2    fluticasone (FLONASE) 50 mcg/act nasal spray 1 spray into each nostril daily      levocetirizine (XYZAL) 5 MG tablet Take 5 mg by mouth every evening      LORazepam (ATIVAN) 0 5 mg tablet Take 1 tablet (0 5 mg total) by mouth as needed (prn) 30 tablet 0    magnesium gluconate (MAGONATE) 500 mg tablet Take 500 mg by mouth 2 (two) times a day      sertraline (ZOLOFT) 25 mg tablet TAKE 1 TABLET TWICE A DAY (Patient taking differently: 50 mg  ) 180 tablet 3    SUMAtriptan (IMITREX) 100 mg tablet Take 1 tablet (100 mg total) by mouth every 2 (two) hours as needed for migraine May repeat in 2 hours if needed 18 tablet 1    vitamin E, tocopherol, 400 units capsule Take 400 Units by mouth daily      EPINEPHrine (EPIPEN) 0 3 mg/0 3 mL SOAJ Inject 0 3 mL (0 3 mg total) into a muscle once for 1 dose 2 each 1     No current facility-administered medications for this visit  Current Outpatient Medications on File Prior to Visit   Medication Sig    Ascorbic Acid (VITAMIN C PO) Take by mouth    calcium-vitamin D (OSCAL) 250-125 MG-UNIT per tablet Take 1 tablet by mouth daily    Cholecalciferol (VITAMIN D PO) Take 2,000 Units by mouth daily    estradiol (ESTRACE VAGINAL) 0 1 mg/g vaginal cream Insert 1 g into the vagina once a week Insert 1 gm 3x a week for 2 weeks then 1gm weekly    fluticasone (FLONASE) 50 mcg/act nasal spray 1 spray into each nostril daily    levocetirizine (XYZAL) 5 MG tablet Take 5 mg by mouth every evening    LORazepam (ATIVAN) 0 5 mg tablet Take 1 tablet (0 5 mg total) by mouth as needed (prn)    magnesium gluconate (MAGONATE) 500 mg tablet Take 500 mg by mouth 2 (two) times a day    sertraline (ZOLOFT) 25 mg tablet TAKE 1 TABLET TWICE A DAY (Patient taking differently: 50 mg  )    SUMAtriptan (IMITREX) 100 mg tablet Take 1 tablet (100 mg total) by mouth every 2 (two) hours as needed for migraine May repeat in 2 hours if needed    vitamin E, tocopherol, 400 units capsule Take 400 Units by mouth daily    EPINEPHrine (EPIPEN) 0 3 mg/0 3 mL SOAJ Inject 0 3 mL (0 3 mg total) into a muscle once for 1 dose     No current facility-administered medications on file prior to visit  She is allergic to bee venom, eggs or egg-derived products - food allergy, fluzone [influenza virus vaccine], iodine - food allergy, shellfish-derived products - food allergy, and ciprofloxacin       Review of Systems   Gastrointestinal: Negative      Genitourinary:        See HPI         Objective:      /72   Pulse 73   Ht 5' 1 5" (1 562 m)   Wt 87 1 kg (192 lb)   LMP 05/08/2019   BMI 35 69 kg/m²          Physical Exam  Vitals reviewed  Abdominal:      General: There is no distension  Palpations: Abdomen is soft  There is no mass  Tenderness: There is no abdominal tenderness  There is no guarding or rebound  Hernia: No hernia is present  There is no hernia in the left inguinal area or right inguinal area  Genitourinary:     General: Normal vulva  Labia:         Right: No rash, tenderness or lesion  Left: No rash, tenderness or lesion  Vagina: Normal       Uterus: Absent  Adnexa:         Right: No mass, tenderness or fullness  Left: No mass, tenderness or fullness  Comments: UA negative  Lymphadenopathy:      Lower Body: No right inguinal adenopathy  No left inguinal adenopathy  Neurological:      Mental Status: She is alert

## 2022-04-21 ENCOUNTER — APPOINTMENT (OUTPATIENT)
Dept: RADIOLOGY | Facility: CLINIC | Age: 57
End: 2022-04-21
Payer: COMMERCIAL

## 2022-04-21 ENCOUNTER — TELEPHONE (OUTPATIENT)
Dept: GYNECOLOGY | Facility: CLINIC | Age: 57
End: 2022-04-21

## 2022-04-21 DIAGNOSIS — R31.9 HEMATURIA OF UNDIAGNOSED CAUSE: ICD-10-CM

## 2022-04-21 DIAGNOSIS — R31.9 HEMATURIA OF UNDIAGNOSED CAUSE: Primary | ICD-10-CM

## 2022-04-21 PROCEDURE — 74018 RADEX ABDOMEN 1 VIEW: CPT

## 2022-04-21 NOTE — TELEPHONE ENCOUNTER
Radiology department called and needs imaging for patient changed to KUB  Kidney, ureter and bladder  Need ASAP    Please cancel pelvis

## 2022-04-28 ENCOUNTER — TELEPHONE (OUTPATIENT)
Dept: FAMILY MEDICINE CLINIC | Facility: CLINIC | Age: 57
End: 2022-04-28

## 2022-04-28 NOTE — TELEPHONE ENCOUNTER
Patient called and said she went to [facility] on Tuesday and just got the results that it is positive for COVID  Her symptoms are chest congestion, nasal congestion, body aches and fever  She is asking if there is anything other than tylenol that she can take for the fever  Fever of 101 5  She has also been taking Dayquil and Nyquil      Cell # [phone]

## 2022-05-06 ENCOUNTER — DOCUMENTATION (OUTPATIENT)
Dept: GYNECOLOGY | Facility: CLINIC | Age: 57
End: 2022-05-06

## 2022-05-06 NOTE — PROGRESS NOTES
Pt was calling about KUB done  Look ok is feeling fine,  pressure gone discomfort is gone  Not sure what to do if this happens again? That is only concern  Please advise       Spoke to Dr Chowdary    If hematuria occurs again go to ER to rule out  Stone,  If no stone and still has blood need to see a urologist

## 2022-05-20 ENCOUNTER — OFFICE VISIT (OUTPATIENT)
Dept: URGENT CARE | Facility: CLINIC | Age: 57
End: 2022-05-20
Payer: COMMERCIAL

## 2022-05-20 ENCOUNTER — TELEPHONE (OUTPATIENT)
Dept: FAMILY MEDICINE CLINIC | Facility: CLINIC | Age: 57
End: 2022-05-20

## 2022-05-20 VITALS
RESPIRATION RATE: 16 BRPM | TEMPERATURE: 97.4 F | WEIGHT: 187 LBS | BODY MASS INDEX: 34.41 KG/M2 | HEART RATE: 60 BPM | OXYGEN SATURATION: 98 % | DIASTOLIC BLOOD PRESSURE: 72 MMHG | HEIGHT: 62 IN | SYSTOLIC BLOOD PRESSURE: 139 MMHG

## 2022-05-20 DIAGNOSIS — J01.90 ACUTE SINUSITIS, RECURRENCE NOT SPECIFIED, UNSPECIFIED LOCATION: Primary | ICD-10-CM

## 2022-05-20 DIAGNOSIS — R10.32 LEFT LOWER QUADRANT ABDOMINAL PAIN: ICD-10-CM

## 2022-05-20 DIAGNOSIS — R11.0 NAUSEA: ICD-10-CM

## 2022-05-20 PROCEDURE — 99213 OFFICE O/P EST LOW 20 MIN: CPT

## 2022-05-20 RX ORDER — AMOXICILLIN 500 MG/1
500 CAPSULE ORAL EVERY 8 HOURS SCHEDULED
Qty: 30 CAPSULE | Refills: 0 | Status: SHIPPED | OUTPATIENT
Start: 2022-05-20 | End: 2022-05-30
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
## 2022-05-20 NOTE — TELEPHONE ENCOUNTER
----- Message from Ciara Mccloud sent at 5/19/2022 11:00 PM EDT -----  Please call patient  She is overdue for a six-month med check  Lab work was okay, and can be reviewed further at that time  I do note she saw it through her my chart

## 2022-08-24 ENCOUNTER — OFFICE VISIT (OUTPATIENT)
Dept: FAMILY MEDICINE CLINIC | Facility: CLINIC | Age: 57
End: 2022-08-24
Payer: COMMERCIAL

## 2022-08-24 VITALS
WEIGHT: 189 LBS | DIASTOLIC BLOOD PRESSURE: 82 MMHG | TEMPERATURE: 98.4 F | OXYGEN SATURATION: 97 % | SYSTOLIC BLOOD PRESSURE: 138 MMHG | BODY MASS INDEX: 34.78 KG/M2 | HEART RATE: 89 BPM | RESPIRATION RATE: 18 BRPM | HEIGHT: 62 IN

## 2022-08-24 DIAGNOSIS — F32.A DEPRESSION, UNSPECIFIED DEPRESSION TYPE: ICD-10-CM

## 2022-08-24 DIAGNOSIS — F32.9 REACTIVE DEPRESSION: ICD-10-CM

## 2022-08-24 DIAGNOSIS — F41.9 ANXIETY: Primary | ICD-10-CM

## 2022-08-24 PROCEDURE — 99213 OFFICE O/P EST LOW 20 MIN: CPT | Performed by: FAMILY MEDICINE

## 2022-08-24 RX ORDER — SERTRALINE HYDROCHLORIDE 25 MG/1
50 TABLET, FILM COATED ORAL DAILY
Qty: 90 TABLET | Refills: 3 | Status: SHIPPED | OUTPATIENT
Start: 2022-08-24

## 2022-08-24 NOTE — PROGRESS NOTES
[facility]      NAME: Gabe Pedro  AGE: 59 y o  SEX: female  : 1965   MRN: [id number]    DATE: 2022  TIME: 8:01 PM    Assessment and Plan     Problem List Items Addressed This Visit     Depression    Relevant Medications    sertraline (ZOLOFT) 25 mg tablet    Anxiety - Primary      Anxiety depression well controlled  Continue current dose of sertraline 50 mg daily  Reviewed blood work from May which was unremarkable  Patient is up-to-date on colon screening and mammogram   Immunizations also up-to-date  Can return in 1 year for annual physical         Return to office in:  1 year    Chief Complaint     Chief Complaint   Patient presents with    Follow-up     "No Concerns"        History of Present Illness     Patient was seen for follow-up on chronic medical problems  She is being treated for anxiety depression with Zoloft  States that she is doing well on 50 mg with no problems or side effects      The following portions of the patient's history were reviewed and updated as appropriate: allergies, current medications, past family history, past medical history, past social history, past surgical history and problem list     Review of Systems   Review of Systems   Constitutional: Negative  Respiratory: Negative  Cardiovascular: Negative  Gastrointestinal: Negative  Genitourinary: Negative  Musculoskeletal: Negative  Psychiatric/Behavioral: Negative          Active Problem List     Patient Active Problem List   Diagnosis    Anxiety    Encounter for routine gynecological examination    Migraine headache    Depression    Vitamin D deficiency    Chronic abdominal pain    Adenomyosis    Knee pain    Osteoarthritis of right knee    Pain in right knee    Obesity, Class II, BMI 35-39 9    BMI 35 0-35 9,adult       Objective   /82   Pulse 89   Temp 98 4 °F (36 9 °C)   Resp 18   Ht 5' 2" (1 575 m)   Wt 85 7 kg (189 lb)   LMP 2019 SpO2 97%   BMI 34 57 kg/m²     Physical Exam  Vitals and nursing note reviewed  Constitutional:       General: She is not in acute distress  Appearance: She is well-developed  She is not diaphoretic  HENT:      Head: Normocephalic and atraumatic  Eyes:      General:         Right eye: No discharge  Conjunctiva/sclera: Conjunctivae normal       Pupils: Pupils are equal, round, and reactive to light  Neck:      Thyroid: No thyromegaly  Cardiovascular:      Rate and Rhythm: Normal rate and regular rhythm  Pulmonary:      Effort: Pulmonary effort is normal  No respiratory distress  Breath sounds: Normal breath sounds  Musculoskeletal:      Cervical back: Normal range of motion  Lymphadenopathy:      Cervical: No cervical adenopathy  Skin:     General: Skin is warm and dry  Neurological:      Mental Status: She is alert and oriented to person, place, and time  Psychiatric:         Behavior: Behavior normal          Thought Content:  Thought content normal          Judgment: Judgment normal            Current Medications     Current Outpatient Medications:     Ascorbic Acid (VITAMIN C PO), Take by mouth, Disp: , Rfl:     calcium-vitamin D (OSCAL) 250-125 MG-UNIT per tablet, Take 1 tablet by mouth daily, Disp: , Rfl:     Cholecalciferol (VITAMIN D PO), Take 2,000 Units by mouth daily, Disp: , Rfl:     EPINEPHrine (EPIPEN) 0 3 mg/0 3 mL SOAJ, Inject 0 3 mL (0 3 mg total) into a muscle once for 1 dose, Disp: 2 each, Rfl: 1    estradiol (ESTRACE VAGINAL) 0 1 mg/g vaginal cream, Insert 1 g into the vagina once a week Insert 1 gm 3x a week for 2 weeks then 1gm weekly, Disp: 42 5 g, Rfl: 2    fluticasone (FLONASE) 50 mcg/act nasal spray, 1 spray into each nostril daily, Disp: , Rfl:     levocetirizine (XYZAL) 5 MG tablet, Take 5 mg by mouth every evening, Disp: , Rfl:     LORazepam (ATIVAN) 0 5 mg tablet, Take 1 tablet (0 5 mg total) by mouth as needed (prn), Disp: 30 tablet, Rfl: 0   magnesium gluconate (MAGONATE) 500 mg tablet, Take 500 mg by mouth 2 (two) times a day, Disp: , Rfl:     sertraline (ZOLOFT) 25 mg tablet, Take 2 tablets (50 mg total) by mouth daily, Disp: 90 tablet, Rfl: 3    SUMAtriptan (IMITREX) 100 mg tablet, Take 1 tablet (100 mg total) by mouth every 2 (two) hours as needed for migraine May repeat in 2 hours if needed, Disp: 18 tablet, Rfl: 1    vitamin E, tocopherol, 400 units capsule, Take 400 Units by mouth daily, Disp: , Rfl:     Health Maintenance     Health Maintenance   Topic Date Due    COVID-19 Vaccine (3 - Booster for Moderna series) 10/27/2021    Breast Cancer Screening: Mammogram  03/04/2022    Influenza Vaccine (1) 09/01/2022    Annual Physical  09/09/2022    BMI: Followup Plan  10/25/2022    BMI: Adult  08/24/2023    Colorectal Cancer Screening  05/18/2025    DTaP,Tdap,and Td Vaccines (3 - Td or Tdap) 08/07/2030    HIV Screening  Completed    Hepatitis C Screening  Completed    Pneumococcal Vaccine: Pediatrics (0 to 5 Years) and At-Risk Patients (6 to 59 Years)  Aged Out    HIB Vaccine  Aged Out    Hepatitis B Vaccine  Aged Out    IPV Vaccine  Aged Out    Hepatitis A Vaccine  Aged Out    Meningococcal ACWY Vaccine  Aged Out    HPV Vaccine  Aged [employer] History   Administered Date(s) Administered    COVID-19 MODERNA VACC 0 5 ML IM 04/22/2021, 05/27/2021    INFLUENZA 01/01/2007, 11/01/2015, 10/18/2018, 10/23/2020, 10/30/2020    Influenza Quadrivalent, 6-35 Months IM 11/01/2015    Influenza, recombinant, quadrivalent,injectable, preservative free 11/04/2021    Influenza, seasonal, injectable 11/01/2016    Tdap 07/02/2008, 08/07/2020    Zoster Vaccine Recombinant 09/09/2021, 01/24/2022       Jared Tenorio DO  [facility]

## 2022-08-25 ENCOUNTER — TELEPHONE (OUTPATIENT)
Dept: ADMINISTRATIVE | Facility: OTHER | Age: 57
End: 2022-08-25

## 2022-08-25 NOTE — TELEPHONE ENCOUNTER
----- Message from Ming Bynum MA sent at 8/24/2022  2:48 PM EDT -----  Regarding: Care Gap Request  08/24/22 2:48 PM    Hello, our patient Jessika Ivan has had Mammogram completed/performed  Please assist in updating the patient chart by pulling the Care Everywhere (CE) document  The date of service is 3/7/22       Thank you,  Ming Bynum MA  [facility]

## 2022-08-25 NOTE — TELEPHONE ENCOUNTER
Upon review of the In Basket request we were able to locate, review, and update the patient chart as requested for Mammogram     Any additional questions or concerns should be emailed to the Practice Liaisons via [email or web address] email, please do not reply via In Basket      Thank you  Cyndee Andersen

## 2022-09-02 ENCOUNTER — ANNUAL EXAM (OUTPATIENT)
Dept: GYNECOLOGY | Facility: CLINIC | Age: 57
End: 2022-09-02
Payer: COMMERCIAL

## 2022-09-02 VITALS
BODY MASS INDEX: 35.15 KG/M2 | HEIGHT: 62 IN | DIASTOLIC BLOOD PRESSURE: 76 MMHG | HEART RATE: 72 BPM | WEIGHT: 191 LBS | SYSTOLIC BLOOD PRESSURE: 130 MMHG

## 2022-09-02 DIAGNOSIS — Z12.31 ENCOUNTER FOR SCREENING MAMMOGRAM FOR MALIGNANT NEOPLASM OF BREAST: Primary | ICD-10-CM

## 2022-09-02 DIAGNOSIS — N95.2 ATROPHIC VAGINITIS: ICD-10-CM

## 2022-09-02 DIAGNOSIS — N39.3 SUI (STRESS URINARY INCONTINENCE, FEMALE): ICD-10-CM

## 2022-09-02 DIAGNOSIS — R39.15 URGENCY OF URINATION: ICD-10-CM

## 2022-09-02 DIAGNOSIS — Z01.419 ENCOUNTER FOR GYNECOLOGICAL EXAMINATION WITHOUT ABNORMAL FINDING: ICD-10-CM

## 2022-09-02 PROCEDURE — S0612 ANNUAL GYNECOLOGICAL EXAMINA: HCPCS | Performed by: OBSTETRICS & GYNECOLOGY

## 2022-09-02 RX ORDER — ONDANSETRON 4 MG/1
TABLET, ORALLY DISINTEGRATING ORAL
COMMUNITY
Start: 2022-08-26

## 2022-09-02 NOTE — PROGRESS NOTES
Assessment/Plan:         Diagnoses and all orders for this visit:    Encounter for screening mammogram for malignant neoplasm of breast  -     Mammo screening bilateral w 3d & cad; Future    Encounter for gynecological examination without abnormal finding    RANDOLPH (stress urinary incontinence, female); discussed surgical correction  Patient opts to follow    Atrophic vaginitis; continue Estrace cream    Urgency of urination; discussed medical management  Patient opts to follow    Other orders  -     ondansetron (ZOFRAN-ODT) 4 mg disintegrating tablet; TAKE 1 TABLET BY MOUTH 3 TIMES A DAY AS NEEDED FOR NAUSEA        Subjective:      Patient ID: Gurjit Rosen is a 59 y o  female  HPI patient presents for annual examination  She is status post TLH BS with lysis of adhesions in May of 2019  She denies any vaginal irritation, burning discharge or bleeding  Denies any dysuria hematuria  She does have stress urinary incontinence  She also has urgency with rare episodes of urgency incontinence  Denies any GI complaints  Colonoscopy May of 2022  Polyps identified  Advised to repeat in 3-5 years  Osteoporosis screening via heel scan August of 2020 1:-0 7    The following portions of the patient's history were reviewed and updated as appropriate:   She  has a past medical history of Abnormal bleeding in menstrual cycle, Adenomyosis, Allergic reaction, Allergic rhinitis, Anxiety, Burn, Depression, Diffuse cystic mastopathy, Diffuse cystic mastopathy, Endometriosis, Fibroid uterus, [place] (hard of hearing), HPV (human papilloma virus) infection (October 2018), Irregular heart beat, Irregular menses (2017), Irritable bowel syndrome, Migraines, MVP (mitral valve prolapse), Ovarian cyst (March 2019), Pelvic pain, PONV (postoperative nausea and vomiting), Snores, and Stress incontinence    She   Patient Active Problem List    Diagnosis Date Noted    Knee pain 10/25/2021    Obesity, Class II, BMI 35-39 9 10/25/2021    BMI 35 0-35 9,adult 10/25/2021    Osteoarthritis of right knee 12/09/2020    Pain in right knee 12/09/2020    Adenomyosis 04/24/2019    Chronic abdominal pain 04/03/2018    Depression 10/25/2017    Encounter for routine gynecological examination 08/11/2015    Anxiety 05/19/2015    Migraine headache 05/17/2015    Vitamin D deficiency 05/30/2014     She  has a past surgical history that includes Nasal septum surgery; Endometrial ablation; Appendectomy; Abdominal surgery; Knee arthroscopy (Right); [place] tooth extraction; EGD; Colonoscopy; Dilation and curettage of uterus; pr laparoscopy w tot hysterectuterus <=250 gram  w tube/ovary (N/A, 5/13/2019); Hysterectomy (May 13, 2019); and Hysteroscopy (2012)  Her family history includes Anxiety disorder in her mother; Cancer in her maternal grandfather; Heart attack in her maternal grandmother; Heart failure in her maternal grandmother; Heart murmur in her mother; Hypertension in her paternal grandfather; Migraines in her mother; No Known Problems in her daughter; Valvular heart disease in her mother  She  reports that she has quit smoking  Her smoking use included cigarettes  She has a 2 50 pack-year smoking history  She has never used smokeless tobacco  She reports current alcohol use  She reports that she does not use drugs    Current Outpatient Medications   Medication Sig Dispense Refill    Ascorbic Acid (VITAMIN C PO) Take by mouth      Cholecalciferol (VITAMIN D PO) Take 2,000 Units by mouth daily      EPINEPHrine (EPIPEN) 0 3 mg/0 3 mL SOAJ Inject 0 3 mL (0 3 mg total) into a muscle once for 1 dose 2 each 1    estradiol (ESTRACE VAGINAL) 0 1 mg/g vaginal cream Insert 1 g into the vagina once a week Insert 1 gm 3x a week for 2 weeks then 1gm weekly 42 5 g 2    fluticasone (FLONASE) 50 mcg/act nasal spray 1 spray into each nostril daily      levocetirizine (XYZAL) 5 MG tablet Take 5 mg by mouth every evening      LORazepam (ATIVAN) 0 5 mg tablet Take 1 tablet (0 5 mg total) by mouth as needed (prn) 30 tablet 0    magnesium gluconate (MAGONATE) 500 mg tablet Take 500 mg by mouth 2 (two) times a day      ondansetron (ZOFRAN-ODT) 4 mg disintegrating tablet TAKE 1 TABLET BY MOUTH 3 TIMES A DAY AS NEEDED FOR NAUSEA      sertraline (ZOLOFT) 25 mg tablet Take 2 tablets (50 mg total) by mouth daily 90 tablet 3    SUMAtriptan (IMITREX) 100 mg tablet Take 1 tablet (100 mg total) by mouth every 2 (two) hours as needed for migraine May repeat in 2 hours if needed 18 tablet 1    vitamin E, tocopherol, 400 units capsule Take 400 Units by mouth daily       No current facility-administered medications for this visit       Current Outpatient Medications on File Prior to Visit   Medication Sig    Ascorbic Acid (VITAMIN C PO) Take by mouth    Cholecalciferol (VITAMIN D PO) Take 2,000 Units by mouth daily    EPINEPHrine (EPIPEN) 0 3 mg/0 3 mL SOAJ Inject 0 3 mL (0 3 mg total) into a muscle once for 1 dose    estradiol (ESTRACE VAGINAL) 0 1 mg/g vaginal cream Insert 1 g into the vagina once a week Insert 1 gm 3x a week for 2 weeks then 1gm weekly    fluticasone (FLONASE) 50 mcg/act nasal spray 1 spray into each nostril daily    levocetirizine (XYZAL) 5 MG tablet Take 5 mg by mouth every evening    LORazepam (ATIVAN) 0 5 mg tablet Take 1 tablet (0 5 mg total) by mouth as needed (prn)    magnesium gluconate (MAGONATE) 500 mg tablet Take 500 mg by mouth 2 (two) times a day    ondansetron (ZOFRAN-ODT) 4 mg disintegrating tablet TAKE 1 TABLET BY MOUTH 3 TIMES A DAY AS NEEDED FOR NAUSEA    sertraline (ZOLOFT) 25 mg tablet Take 2 tablets (50 mg total) by mouth daily    SUMAtriptan (IMITREX) 100 mg tablet Take 1 tablet (100 mg total) by mouth every 2 (two) hours as needed for migraine May repeat in 2 hours if needed    vitamin E, tocopherol, 400 units capsule Take 400 Units by mouth daily    [DISCONTINUED] calcium-vitamin D (OSCAL) 250-125 MG-UNIT per tablet Take 1 tablet by mouth daily     No current facility-administered medications on file prior to visit  She is allergic to bee venom, eggs or egg-derived products - food allergy, fluzone [influenza virus vaccine], iodine - food allergy, shellfish-derived products - food allergy, and ciprofloxacin       Review of Systems   Constitutional: Negative  HENT: Negative for sore throat and trouble swallowing  Gastrointestinal: Negative  Genitourinary: Negative  See HPI         Objective:      /76   Pulse 72   Ht 5' 2" (1 575 m)   Wt 86 6 kg (191 lb)   LMP 05/08/2019   BMI 34 93 kg/m²          Physical Exam  Vitals reviewed  Constitutional:       Appearance: Normal appearance  Cardiovascular:      Rate and Rhythm: Normal rate and regular rhythm  Pulses: Normal pulses  Heart sounds: Normal heart sounds  No murmur heard  Pulmonary:      Effort: Pulmonary effort is normal  No respiratory distress  Breath sounds: Normal breath sounds  Chest:   Breasts:      Right: No swelling, bleeding, inverted nipple, mass, nipple discharge, skin change, tenderness, axillary adenopathy or supraclavicular adenopathy  Left: No swelling, bleeding, inverted nipple, mass, nipple discharge, skin change, tenderness, axillary adenopathy or supraclavicular adenopathy  Abdominal:      General: There is no distension  Palpations: Abdomen is soft  There is no mass  Tenderness: There is no abdominal tenderness  There is no guarding or rebound  Hernia: No hernia is present  There is no hernia in the left inguinal area or right inguinal area  Genitourinary:     General: Normal vulva  Labia:         Right: No rash, tenderness or lesion  Left: No rash, tenderness or lesion  Vagina: Erythema (atrophic) present  Uterus: Absent  Adnexa:         Right: No mass, tenderness or fullness  Left: No mass, tenderness or fullness       Musculoskeletal: Cervical back: Normal range of motion and neck supple  No tenderness  Lymphadenopathy:      Cervical: No cervical adenopathy  Upper Body:      Right upper body: No supraclavicular, axillary or pectoral adenopathy  Left upper body: No supraclavicular, axillary or pectoral adenopathy  Lower Body: No right inguinal adenopathy  No left inguinal adenopathy  Neurological:      Mental Status: She is alert

## 2022-09-09 ENCOUNTER — TELEPHONE (OUTPATIENT)
Dept: FAMILY MEDICINE CLINIC | Facility: CLINIC | Age: 57
End: 2022-09-09

## 2022-09-09 NOTE — TELEPHONE ENCOUNTER
Phone call from patient stating she has been vomiting and having diarrhea starting this morning  She had chills, but no fever  She took zofran and symptoms have subsided  She was able to recently tolerate eating toast   She is inquiring if it could be viral   I informed her without evaluating her or doing tests there's no way for us to know  She declined an appointment at this time  She was advised to stay well hydrated, and if symptoms persist, to be evaluated  [phone]

## 2022-10-09 ENCOUNTER — APPOINTMENT (OUTPATIENT)
Dept: RADIOLOGY | Facility: MEDICAL CENTER | Age: 57
End: 2022-10-09
Payer: COMMERCIAL

## 2022-10-09 ENCOUNTER — OFFICE VISIT (OUTPATIENT)
Dept: URGENT CARE | Facility: MEDICAL CENTER | Age: 57
End: 2022-10-09
Payer: COMMERCIAL

## 2022-10-09 VITALS
HEART RATE: 77 BPM | DIASTOLIC BLOOD PRESSURE: 105 MMHG | OXYGEN SATURATION: 97 % | TEMPERATURE: 97.2 F | SYSTOLIC BLOOD PRESSURE: 143 MMHG | WEIGHT: 190 LBS | BODY MASS INDEX: 34.96 KG/M2 | HEIGHT: 62 IN

## 2022-10-09 DIAGNOSIS — S93.401A SPRAIN OF RIGHT ANKLE, UNSPECIFIED LIGAMENT, INITIAL ENCOUNTER: ICD-10-CM

## 2022-10-09 DIAGNOSIS — S99.921A RIGHT FOOT INJURY, INITIAL ENCOUNTER: Primary | ICD-10-CM

## 2022-10-09 DIAGNOSIS — S99.911A ANKLE INJURY, RIGHT, INITIAL ENCOUNTER: ICD-10-CM

## 2022-10-09 DIAGNOSIS — S99.921A RIGHT FOOT INJURY, INITIAL ENCOUNTER: ICD-10-CM

## 2022-10-09 PROCEDURE — 73610 X-RAY EXAM OF ANKLE: CPT

## 2022-10-09 PROCEDURE — 99213 OFFICE O/P EST LOW 20 MIN: CPT | Performed by: FAMILY MEDICINE

## 2022-10-09 PROCEDURE — 73630 X-RAY EXAM OF FOOT: CPT

## 2022-10-09 NOTE — PATIENT INSTRUCTIONS
X-ray of right foot and right ankle reveals no fracture or subluxation  There is significant soft tissue swelling  Official radiology interpretation is still pending  Ankle Sprain   WHAT YOU NEED TO KNOW:   An ankle sprain happens when 1 or more ligaments in your ankle joint stretch or tear  Ligaments are tough tissues that connect bones  Ligaments support your joints and keep your bones in place  DISCHARGE INSTRUCTIONS:   Return to the emergency department if:   You have severe pain in your ankle  Your foot or toes are cold or numb  Your ankle becomes more weak or unstable (wobbly)  You are unable to put any weight on your ankle or foot  Your swelling has increased or returned  Call your doctor if:   Your pain does not go away, even after treatment  You have questions or concerns about your condition or care  Medicines: You may need any of the following:  NSAIDs , such as ibuprofen, help decrease swelling, pain, and fever  This medicine is available with or without a doctor's order  NSAIDs can cause stomach bleeding or kidney problems in certain people  If you take blood thinner medicine, always ask your healthcare provider if NSAIDs are safe for you  Always read the medicine label and follow directions  Acetaminophen  decreases pain and fever  It is available without a doctor's order  Ask how much to take and how often to take it  Follow directions  Read the labels of all other medicines you are using to see if they also contain acetaminophen, or ask your doctor or pharmacist  Acetaminophen can cause liver damage if not taken correctly  Do not use more than 4 grams (4,000 milligrams) total of acetaminophen in one day  Prescription pain medicine  may be given  Ask your healthcare provider how to take this medicine safely  Some prescription pain medicines contain acetaminophen  Do not take other medicines that contain acetaminophen without talking to your healthcare provider   Too much acetaminophen may cause liver damage  Prescription pain medicine may cause constipation  Ask your healthcare provider how to prevent or treat constipation  Take your medicine as directed  Contact your healthcare provider if you think your medicine is not helping or if you have side effects  Tell him or her if you are allergic to any medicine  Keep a list of the medicines, vitamins, and herbs you take  Include the amounts, and when and why you take them  Bring the list or the pill bottles to follow-up visits  Carry your medicine list with you in case of an emergency  Self-care:   Use support devices,  such as a brace, cast, or splint, to limit your movement and protect your joint  You may need to use crutches to decrease your pain as you move around  Go to physical therapy as directed  A physical therapist teaches you exercises to help improve movement and strength, and to decrease pain  Rest  your ankle so that it can heal  Return to normal activities as directed  Apply ice  on your ankle for 15 to 20 minutes every hour or as directed  Use an ice pack, or put crushed ice in a plastic bag  Cover it with a towel  Ice helps prevent tissue damage and decreases swelling and pain  Compress  your ankle  Ask if you should wrap an elastic bandage around your injured ligament  An elastic bandage provides support and helps decrease swelling and movement so your joint can heal  Wear as long as directed  Elevate  your ankle above the level of your heart as often as you can  This will help decrease swelling and pain  Prop your ankle on pillows or blankets to keep it elevated comfortably  Prevent another ankle sprain:   Let your ankle heal   Find out how long your ligament needs to heal  Do not do any physical activity until your healthcare provider says it is okay  If you start activity too soon, you may develop a more serious injury      Always warm up and stretch  before you exercise or play sports  Use the right equipment  Always wear shoes that fit well and are made for the activity that you are doing  You may also need ankle supports, elbow and knee pads, or braces  Follow up with your doctor as directed:  Write down your questions so you remember to ask them during your visits  © [employer] 2022 Information is for End User's use only and may not be sold, redistributed or otherwise used for commercial purposes  All illustrations and images included in CareNotes® are the copyrighted property of A D A M , Inc  or [address] [place] [address]  The above information is an  only  It is not intended as medical advice for individual conditions or treatments  Talk to your doctor, nurse or pharmacist before following any medical regimen to see if it is safe and effective for you  Patient placed in a cam boot, given crutches to avoid weight-bearing  Strongly advised patient to keep foot elevated when possible, apply ice 15-20 Ms  Hourly  May take Tylenol or ibuprofen    If pain or swelling persists after 7-10 days, patient was given outpatient orthopedic referral
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
## 2022-10-13 ENCOUNTER — OFFICE VISIT (OUTPATIENT)
Dept: OBGYN CLINIC | Facility: MEDICAL CENTER | Age: 57
End: 2022-10-13
Payer: COMMERCIAL

## 2022-10-13 VITALS
SYSTOLIC BLOOD PRESSURE: 127 MMHG | HEIGHT: 62 IN | BODY MASS INDEX: 34.96 KG/M2 | WEIGHT: 190 LBS | HEART RATE: 76 BPM | DIASTOLIC BLOOD PRESSURE: 87 MMHG

## 2022-10-13 DIAGNOSIS — S93.491A SPRAIN OF ANTERIOR TALOFIBULAR LIGAMENT OF RIGHT ANKLE, INITIAL ENCOUNTER: Primary | ICD-10-CM

## 2022-10-13 DIAGNOSIS — S93.421A SPRAIN OF DELTOID LIGAMENT OF RIGHT ANKLE, INITIAL ENCOUNTER: ICD-10-CM

## 2022-10-13 PROCEDURE — 99203 OFFICE O/P NEW LOW 30 MIN: CPT | Performed by: EMERGENCY MEDICINE

## 2022-10-13 NOTE — PATIENT INSTRUCTIONS
You may use Advil (ibuprofen) 600mg every 6 hours or at least twice per day OR Aleve (naproxen) 250-500mg every 12 hours as needed for pain and inflammation  You may also take Tylenol 500mg every 4-6 hours as needed OR max 1,000mg per dose up to 3 times per day for a total of 3,000mg per day  Check with your primary care physician to see if these medications are safe to take and to make sure they do not interfere with your other medications and medical issues  You may weightbear as tolerated in the walking boot with the help of crutches as needed based on pain levels  May take the boot off when you are resting or sleeping  As her symptoms improve you may wean from the boot into the ankle brace and a supportive shoe or sneaker  Ankle Sprain   AMBULATORY CARE:   An ankle sprain  happens when 1 or more ligaments in your ankle joint stretch or tear  Ligaments are tough tissues that connect bones  Ligaments support your joints and keep your bones in place  Common symptoms include the following:   Trouble moving your ankle or foot    Pain when you touch or put weight on your ankle    Bruised, swollen, or misshapen ankle  Seek care immediately if:   You have severe pain in your ankle  Your foot or toes are cold or numb  Your ankle becomes more weak or unstable (wobbly)  You are unable to put any weight on your ankle or foot  Your swelling has increased or returned  Contact your healthcare provider if:   Your pain does not go away, even after treatment  You have questions or concerns about your condition or care  Treatment:   Medicines      NSAIDs , such as ibuprofen, help decrease swelling, pain, and fever  This medicine is available with or without a doctor's order  NSAIDs can cause stomach bleeding or kidney problems in certain people  If you take blood thinner medicine, always ask your healthcare provider if NSAIDs are safe for you   Always read the medicine label and follow directions  Acetaminophen  decreases pain  It is available without a doctor's order  Ask how much to take and how often to take it  Follow directions  Acetaminophen can cause liver damage if not taken correctly  Prescription pain medicine  may be given  Ask how to take this medicine safely  Surgery  may be needed to repair or replace a torn ligament if your sprain does not heal with other treatments  Your healthcare provider may use screws to attach the bones in your ankle together  The screws may help support your ankle and make it stable  Ask your healthcare provider for more information about surgery to treat your ankle sprain  Self care:   Use support devices,  such as a brace, cast, or splint, may be needed to limit your movement and protect your joint  You may need to use crutches to decrease your pain as you move around  Go to physical therapy as directed  A physical therapist teaches you exercises to help improve movement and strength, and to decrease pain  Rest  your ankle so that it can heal  Return to normal activities as directed  Apply ice on your ankle for 15 to 20 minutes every hour or as directed  Use an ice pack, or put crushed ice in a plastic bag  Cover it with a towel  Ice helps prevent tissue damage and decreases swelling and pain  Compress  your ankle  Ask if you should wrap an elastic bandage around your injured ligament  An elastic bandage provides support and helps decrease swelling and movement so your joint can heal  Wear as long as directed  Elevate  your ankle above the level of your heart as often as you can  This will help decrease swelling and pain  Prop your ankle on pillows or blankets to keep it elevated comfortably  Prevent another ankle sprain:   Let your ankle heal   Find out how long your ligament needs to heal  Do not do any physical activity until your healthcare provider says it is okay   If you start activity too soon, you may develop a more serious injury  Always warm up and stretch  before you exercise or play sports  Use the right equipment  Always wear shoes that fit well and are made for the activity that you are doing  You may also need ankle supports, elbow and knee pads, or braces  Follow up with your healthcare provider as directed:  Write down your questions so you remember to ask them during your visits  © 2017 [address] Information is for End User's use only and may not be sold, redistributed or otherwise used for commercial purposes  All illustrations and images included in CareNotes® are the copyrighted property of [employer] M , Inc  or Rigo Luke  The above information is an  only  It is not intended as medical advice for individual conditions or treatments  Talk to your doctor, nurse or pharmacist before following any medical regimen to see if it is safe and effective for you  Ankle Exercises   AMBULATORY CARE:   What you need to know about ankle exercises: Ankle exercises help strengthen your ankle and improve its function after injury  These are beginning exercises  Ask your healthcare provider if you need to see a physical therapist for more advanced exercises  Do these exercises 3 to 5 days a week , or as directed by your healthcare provider  Ask if you should perform the exercises on each ankle  Do the exercises in the order that your healthcare provider recommends  This will help prevent swelling, chronic pain, and reinjury  Start with range of motion exercises  Then progress to strengthening exercises, and finally to balancing exercises  Warm up before you do ankle exercises  Walk or ride a stationary bike for 5 to 10 minutes to prepare your ankle for movement  Stop if you feel pain  It is normal to feel some discomfort at first  Regular exercise will help decrease your discomfort over time    How to perform range of motion exercises safely:  Begin with range of motion exercises to improve flexibility  Ask your healthcare provider when you can progress to strengthening exercises  Ankle alphabet:  Sit on a chair so that your feet do not touch the floor  Use your big toe to write each letter of the alphabet  Use only your foot and ankle, and keep your movements small  Do 2 sets  Calf stretches:      Sitting calf stretches with a towel:  Sit on the floor with both legs out straight in front of you  Loop a towel around the ball of your injured foot  Grasp the ends of the towel and pull it toward you  Keep your leg and back straight  Do not lean forward as you pull the towel  Hold for 30 seconds  Then relax for 30 seconds  Do 2 sets of 10  Standing calf stretches:  Stand facing a wall with the foot that is not injured forward and your knee slightly bent  Keep the leg with the injured foot straight and behind you with your toes pointed in slightly  With both heels flat on the floor, press your hips forward  Do not arch your back  Hold for 30 seconds, and then relax for 30 seconds  Do 2 sets of 10  Repeat with your leg bent  Do 2 sets of 10  How to perform strengthening exercises safely:  After you can perform range of motion exercises without pain, you may begin strengthening exercises  Ask your healthcare provider when you can progress to balancing exercises  Ankle movement in 4 directions:  Sit on the floor with your legs straight in front of you  Keep your heels on the floor for support  Dorsiflexion:  Begin with your toes pointing straight up  Pull your toes toward your body  Slowly return to the starting position  Do 3 sets of 5  Plantar flexion:  Begin with your toes pointing straight up  Push your toes away from your body  Slowly return to the starting position  Do 3 sets of 5  Inversion:  Begin with your toes pointing straight up  Push your toes inward, toward each other  Slowly return to the starting position  Do 3 sets of 5  Eversion:  Begin with your toes pointing straight up  Push your toes outward, away from each other  Slowly return to the starting position  Do 3 sets of 5  Toe curls with a towel:  Sit on a chair so that both of your feet are flat on the floor  Place a small towel on the floor in front of your injured foot  Grab the center of the towel with your toes and curl the towel toward you  Relax and repeat  Do 1 set of 5  [place] pick-ups:  Sit on a chair so that both of your feet are flat on the floor  Place 20 marbles on the floor in front of your injured foot  Use your toes to  one marble at a time and place it into a bowl  Repeat until you have picked up all the marbles  Do 1 set  Heel raises:      Single leg heel raises:  Stand with your weight evenly on both feet  Hold on to a chair or a wall for balance  Lift the foot that is not injured off the floor so all your weight is placed on your injured foot  Raise the heel of your injured foot as high as you can  Slowly lower your heel to the floor  Do 1 set of 10  Double leg heel raises:  Stand with your weight evenly on both feet  Hold on to a chair or a wall for balance  Raise both of your heels as high as you can  Slowly lower your heels to the floor  Do 1 set of 10  Heel and toe walks:      Heel walks:  Begin in a standing position  Lift your toes off the floor and walk on your heels  Keep your toes lifted as high as possible  Do 2 sets of 10  Toe walks:  Begin in a standing position  Lift your heels off the floor and walk on the balls and toes of your feet  Keep your heels lifted as high as possible  Do 2 sets of 10  How to perform a balance exercise safely:  After you can perform strengthening exercises without pain, you may do this beginning balancing exercise  Ask your healthcare provider for more advanced balance exercises    Single leg stance:  Stand with your weight evenly on both feet, or hold on to a chair or a wall  Do not lean to the side  Lift the foot that is not injured off the floor so all your weight is placed on your injured foot  Balance on your injured foot  Ask your healthcare provider how long to hold this position  Contact your healthcare provider if:   Your pain becomes worse  You have new pain  You have questions or concerns about your condition, care, or exercise program   © 2017 [address] [employer] Information is for End User's use only and may not be sold, redistributed or otherwise used for commercial purposes  All illustrations and images included in CareNotes® are the copyrighted property of [employer]  or Rigo Luke  The above information is an  only  It is not intended as medical advice for individual conditions or treatments  Talk to your doctor, nurse or pharmacist before following any medical regimen to see if it is safe and effective for you

## 2022-10-13 NOTE — PROGRESS NOTES
Assessment/Plan:    Diagnoses and all orders for this visit:    Sprain of anterior talofibular ligament of right ankle, initial encounter  -     Ambulatory Referral to Orthopedic Surgery  -     Ankle Cude ankle/Ankle Brace    Sprain of deltoid ligament of right ankle, initial encounter  -     Ankle Cude ankle/Ankle Brace    You may weightbear as tolerated in the walking boot with the help of crutches as needed based on pain levels  May take the boot off when you are resting or sleeping  As her symptoms improve you may wean from the boot into the ankle brace and a supportive shoe or sneaker  Return in about 4 weeks (around 11/10/2022)  Chief Complaint:     Chief Complaint   Patient presents with   • Right Ankle - Pain       Subjective:   Patient ID: Mitchel Camacho is a 59 y o  female  NP presents for Right ankle injury occurring while walking down the stairs states she accidentally missed the last several steps and experienced a hyper plantar flexion injury to the ankle  She was evaluated at the urgent care x-rays were obtained of the ankle and the foot provided a walking boot and crutches  She has noticed some improved symptoms as she is able to now ambulate in the boot without the crutches  Review of Systems    The following portions of the patient's chart were reviewed and updated as appropriate:    Allergy:    Allergies   Allergen Reactions   • Bee Venom Anaphylaxis   • Eggs Or Egg-Derived Products - Food Allergy Anaphylaxis     Egg whites- tested at allergist   • Fluzone [Influenza Virus Vaccine] Anaphylaxis, Shortness Of Breath, Diarrhea and Throat Swelling     10/18/18 given at employer   • Iodine - Food Allergy Tachycardia     IV contrast dye caused tachycardia   • Shellfish-Derived Products - Food Allergy Diarrhea and Vomiting   • Ciprofloxacin          Past Medical History:   Diagnosis Date   • Abnormal bleeding in menstrual cycle    • Adenomyosis    • Allergic reaction     LAST ASSESSED: 11/20/14   • Allergic rhinitis     LAST ASSESSED: 11/20/14   • Anxiety    • Burn     Left upper, inner arm--from cooking  Almost healed   • Depression    • Diffuse cystic mastopathy    • Diffuse cystic mastopathy    • Endometriosis    • Fibroid uterus    • [place] (hard of hearing)     Slightly   • HPV (human papilloma virus) infection October 2018   • Irregular heart beat    • Irregular menses 2017   • Irritable bowel syndrome    • Migraines    • MVP (mitral valve prolapse)    • Ovarian cyst March 2019   • Pelvic pain    • PONV (postoperative nausea and vomiting)    • Snores    • Stress incontinence     Occasional       Past Surgical History:   Procedure Laterality Date   • ABDOMINAL SURGERY      laparotomy secondary to SBO age 21   • APPENDECTOMY     • COLONOSCOPY     • DILATION AND CURETTAGE OF UTERUS     • EGD     • ENDOMETRIAL ABLATION     • HYSTERECTOMY  May 13, 2019   • HYSTEROSCOPY  2012    I had 2 completed in 2012   • KNEE ARTHROSCOPY Right    • NASAL SEPTUM SURGERY     • [place] LAPAROSCOPY W TOT HYSTERECTUTERUS <=250 GRAM  W TUBE/OVARY N/A 5/13/2019    Procedure: LAP TOTAL HYSTERECTOMY, B/L SALPINGECTOMY, EXTENSIVE ADHESIOLYSIS, CYSTOSCOPY;  Surgeon: Damir Woo DO;  Location: [facility];  Service: Gynecology   • WISDOM TOOTH EXTRACTION         Social History     Socioeconomic History   • Marital status: /Civil Union     Spouse name: Not on file   • Number of children: Not on file   • Years of education: Not on file   • Highest education level: Not on file   Occupational History   • Not on file   Tobacco Use   • Smoking status: Former Smoker     Packs/day: 0 25     Years: 10 00     Pack years: 2 50     Types: Cigarettes   • Smokeless tobacco: Never Used   • Tobacco comment: light smoker / 23 yrs ago   Vaping Use   • Vaping Use: Never used   Substance and Sexual Activity   • Alcohol use:  Yes   • Drug use: No   • Sexual activity: Yes     Partners: Male     Birth control/protection: Other Other Topics Concern   • Not on file   Social History Narrative   • Not on file     Social Determinants of Health     Financial Resource Strain: Not on file   Food Insecurity: Not on file   Transportation Needs: Not on file   Physical Activity: Not on file   Stress: Not on file   Social Connections: Not on file   Intimate Partner Violence: Not on file   Housing Stability: Not on file       Family History   Problem Relation Age of Onset   • Valvular heart disease Mother    • Heart murmur Mother    • Anxiety disorder Mother    • Migraines Mother    • Heart attack Maternal Grandmother    • Heart failure Maternal Grandmother    • Hypertension Paternal Grandfather    • Cancer Maternal Grandfather         Prostate Cancer   • No Known Problems Daughter        Medications:    Current Outpatient Medications:   •  Ascorbic Acid (VITAMIN C PO), Take by mouth, Disp: , Rfl:   •  Cholecalciferol (VITAMIN D PO), Take 2,000 Units by mouth daily, Disp: , Rfl:   •  estradiol (ESTRACE VAGINAL) 0 1 mg/g vaginal cream, Insert 1 g into the vagina once a week Insert 1 gm 3x a week for 2 weeks then 1gm weekly, Disp: 42 5 g, Rfl: 2  •  fluticasone (FLONASE) 50 mcg/act nasal spray, 1 spray into each nostril daily, Disp: , Rfl:   •  levocetirizine (XYZAL) 5 MG tablet, Take 5 mg by mouth every evening, Disp: , Rfl:   •  LORazepam (ATIVAN) 0 5 mg tablet, Take 1 tablet (0 5 mg total) by mouth as needed (prn), Disp: 30 tablet, Rfl: 0  •  magnesium gluconate (MAGONATE) 500 mg tablet, Take 500 mg by mouth 2 (two) times a day, Disp: , Rfl:   •  ondansetron (ZOFRAN-ODT) 4 mg disintegrating tablet, TAKE 1 TABLET BY MOUTH 3 TIMES A DAY AS NEEDED FOR NAUSEA, Disp: , Rfl:   •  sertraline (ZOLOFT) 25 mg tablet, Take 2 tablets (50 mg total) by mouth daily, Disp: 90 tablet, Rfl: 3  •  SUMAtriptan (IMITREX) 100 mg tablet, Take 1 tablet (100 mg total) by mouth every 2 (two) hours as needed for migraine May repeat in 2 hours if needed, Disp: 18 tablet, Rfl: 1  •  vitamin E, tocopherol, 400 units capsule, Take 400 Units by mouth daily, Disp: , Rfl:   •  EPINEPHrine (EPIPEN) 0 3 mg/0 3 mL SOAJ, Inject 0 3 mL (0 3 mg total) into a muscle once for 1 dose, Disp: 2 each, Rfl: 1    Patient Active Problem List   Diagnosis   • Anxiety   • Migraine headache   • Depression   • Vitamin D deficiency   • Chronic abdominal pain   • Adenomyosis   • Knee pain   • Osteoarthritis of right knee   • Pain in right knee   • Obesity, Class II, BMI 35-39 9   • BMI 35 0-35 9,adult       Objective:  /87   Pulse 76   Ht 5' 2" (1 575 m)   Wt 86 2 kg (190 lb)   LMP 05/08/2019   BMI 34 75 kg/m²     Right Ankle Exam     Tenderness   The patient is experiencing tenderness in the ATF and deltoid  Swelling: moderate    Range of Motion   Eversion: abnormal   Inversion: abnormal     Tests   Varus tilt: positive    Other   Erythema: absent  Sensation: normal  Pulse: present     Comments:  No tenderness to palpation of the proximal half of the fibula  Negative squeeze test   There is no tenderness to palpation of the midfoot or base of the 5th MT            Physical Exam      Neurologic Exam    Procedures    I have personally reviewed pertinent films in PACS  and I have personally reviewed the written report of the pertinent studies     Xrays Ankle and Foot

## 2022-10-27 ENCOUNTER — TELEPHONE (OUTPATIENT)
Dept: OBGYN CLINIC | Facility: HOSPITAL | Age: 57
End: 2022-10-27

## 2022-10-27 NOTE — TELEPHONE ENCOUNTER
She doesn't need to wear the ankle brace if her ankle is feeling stable  However if she is feeling unstable she may need to go back into the CAM boot until her swelling goes down

## 2022-10-27 NOTE — TELEPHONE ENCOUNTER
Caller: Tamara Archer    Doctor: Raul Wild    Reason for call: Her right ankle is still swollen and she is not able to wear the new brace it was cutting off her circulation  She is wondering if this is normal and if there is another brace she can wear        Call back#: [phone]

## 2022-10-27 NOTE — TELEPHONE ENCOUNTER
Called & spoke to patient  I conveyed Dr Lyndle Spatz message  She has already switched back to CAM boot as her ankle is not stable yet  She has also started taking Ibuprofen for pain & swelling  I did remind her benefits of rest, ice, & elevation  She will call on Monday if no improvement

## 2022-11-10 ENCOUNTER — OFFICE VISIT (OUTPATIENT)
Dept: OBGYN CLINIC | Facility: MEDICAL CENTER | Age: 57
End: 2022-11-10

## 2022-11-10 VITALS
HEART RATE: 81 BPM | SYSTOLIC BLOOD PRESSURE: 126 MMHG | BODY MASS INDEX: 36.25 KG/M2 | HEIGHT: 62 IN | DIASTOLIC BLOOD PRESSURE: 87 MMHG | WEIGHT: 197 LBS

## 2022-11-10 DIAGNOSIS — S93.491D SPRAIN OF ANTERIOR TALOFIBULAR LIGAMENT OF RIGHT ANKLE, SUBSEQUENT ENCOUNTER: Primary | ICD-10-CM

## 2022-11-10 DIAGNOSIS — S93.421D SPRAIN OF DELTOID LIGAMENT OF RIGHT ANKLE, SUBSEQUENT ENCOUNTER: ICD-10-CM

## 2022-11-10 NOTE — PROGRESS NOTES
Assessment/Plan:    Diagnoses and all orders for this visit:    Sprain of anterior talofibular ligament of right ankle, subsequent encounter    Sprain of deltoid ligament of right ankle, subsequent encounter    Continue home exercises, declines formal PT at this time  Home OTC ankle brace, continue activity as tolerated based on pain symptoms    Return in about 4 weeks (around 12/8/2022)  Chief Complaint:     Chief Complaint   Patient presents with   • Right Ankle - Follow-up       Subjective:   Patient ID: Gege Funes is a 59 y o  female  Patient returns with improving symptoms, still with swelling, and notes lateral and posterior pain/discomfort  However she has been able to go for walks with her dog, and has weaned out of the boot wearing OTC ankle brace with shoes  She feels 75% back to baseline      Initial note:  NP presents for Right ankle injury occurring while walking down the stairs states she accidentally missed the last several steps and experienced a hyper plantar flexion injury to the ankle  She was evaluated at the urgent care x-rays were obtained of the ankle and the foot provided a walking boot and crutches  She has noticed some improved symptoms as she is able to now ambulate in the boot without the crutches  Review of Systems    The following portions of the patient's chart were reviewed and updated as appropriate:      Allergie  Allergies   Allergen Reactions   • Bee Venom Anaphylaxis   • Eggs Or Egg-Derived Products - Food Allergy Anaphylaxis     Egg whites- tested at allergist   • Fluzone [Influenza Virus Vaccine] Anaphylaxis, Shortness Of Breath, Diarrhea and Throat Swelling     10/18/18 given at employer   • Iodine - Food Allergy Tachycardia     IV contrast dye caused tachycardia   • Shellfish-Derived Products - Food Allergy Diarrhea and Vomiting   • Ciprofloxacin    s   Allergen Reactions   • Bee Venom Anaphylaxis   • Eggs Or Egg-Derived Products - Food Allergy Anaphylaxis     Egg whites- tested at allergist   • Fluzone [Influenza Virus Vaccine] Anaphylaxis, Shortness Of Breath, Diarrhea and Throat Swelling     10/18/18 given at employer   • Iodine - Food Allergy Tachycardia     IV contrast dye caused tachycardia   • Shellfish-Derived Products - Food Allergy Diarrhea and Vomiting   • Ciprofloxacin       Diagnosis Date   • Abnormal bleeding in menstrual cycle    • Adenomyosis    • Allergic reaction     LAST ASSESSED: 11/20/14   • Allergic rhinitis     LAST ASSESSED: 11/20/14   • Anxiety    • Burn     Left upper, inner arm--from cooking   Almost healed   • Depression    • Diffuse cystic mastopathy    • Diffuse cystic mastopathy    • Endometriosis    • Fibroid uterus    • [place] (hard of hearing)     Slightly   • HPV (human papilloma virus) infection October 2018   • Irregular heart beat    • Irregular menses 2017   • Irritable bowel syndrome    • Migraines    • MVP (mitral valve prolapse)    • Ovarian cyst March 2019   • Pelvic pain    • PONV (postoperative nausea and vomiting)    • Snores    • Stress incontinence     Occasional       Past Surgical History:   Procedure Laterality Date   • ABDOMINAL SURGERY      laparotomy secondary to SBO age 21   • APPENDECTOMY     • COLONOSCOPY     • DILATION AND CURETTAGE OF UTERUS     • EGD     • ENDOMETRIAL ABLATION     • HYSTERECTOMY  May 13, 2019   • HYSTEROSCOPY  2012    I had 2 completed in 2012   • KNEE ARTHROSCOPY Right    • NASAL SEPTUM SURGERY     • [place] LAPAROSCOPY W TOT HYSTERECTUTERUS <=250 GRAM  W TUBE/OVARY N/A 5/13/2019    Procedure: LAP TOTAL HYSTERECTOMY, B/L SALPINGECTOMY, EXTENSIVE ADHESIOLYSIS, CYSTOSCOPY;  Surgeon: Cira Babcock DO;  Location: [facility];  Service: Gynecology   • WISDOM TOOTH EXTRACTION         Social History     Socioeconomic History   • Marital status: /Civil Union     Spouse name: Not on file   • Number of children: Not on file   • Years of education: Not on file   • Highest education level: Not on file   Occupational History   • Not on file   Tobacco Use   • Smoking status: Former Smoker     Packs/day: 0 25     Years: 10 00     Pack years: 2 50     Types: Cigarettes   • Smokeless tobacco: Never Used   • Tobacco comment: light smoker / 23 yrs ago   Vaping Use   • Vaping Use: Never used   Substance and Sexual Activity   • Alcohol use:  Yes   • Drug use: No   • Sexual activity: Yes     Partners: Male     Birth control/protection: Other   Other Topics Concern   • Not on file   Social History Narrative   • Not on file     Social Determinants of Health     Financial Resource Strain: Not on file   Food Insecurity: Not on file   Transportation Needs: Not on file   Physical Activity: Not on file   Stress: Not on file   Social Connections: Not on file   Intimate Partner Violence: Not on file   Housing Stability: Not on file       Family History   Problem Relation Age of Onset   • Valvular heart disease Mother    • Heart murmur Mother    • Anxiety disorder Mother    • Migraines Mother    • Heart attack Maternal Grandmother    • Heart failure Maternal Grandmother    • Hypertension Paternal Grandfather    • Cancer Maternal Grandfather         Prostate Cancer   • No Known Problems Daughter        Medications:    Current Outpatient Medications:   •  Ascorbic Acid (VITAMIN C PO), Take by mouth, Disp: , Rfl:   •  Cholecalciferol (VITAMIN D PO), Take 2,000 Units by mouth daily, Disp: , Rfl:   •  fluticasone (FLONASE) 50 mcg/act nasal spray, 1 spray into each nostril daily, Disp: , Rfl:   •  levocetirizine (XYZAL) 5 MG tablet, Take 5 mg by mouth every evening, Disp: , Rfl:   •  LORazepam (ATIVAN) 0 5 mg tablet, Take 1 tablet (0 5 mg total) by mouth as needed (prn), Disp: 30 tablet, Rfl: 0  •  magnesium gluconate (MAGONATE) 500 mg tablet, Take 500 mg by mouth 2 (two) times a day, Disp: , Rfl:   •  sertraline (ZOLOFT) 25 mg tablet, Take 2 tablets (50 mg total) by mouth daily, Disp: 90 tablet, Rfl: 3  •  SUMAtriptan (IMITREX) 100 mg tablet, Take 1 tablet (100 mg total) by mouth every 2 (two) hours as needed for migraine May repeat in 2 hours if needed, Disp: 18 tablet, Rfl: 1  •  vitamin E, tocopherol, 400 units capsule, Take 400 Units by mouth daily, Disp: , Rfl:   •  EPINEPHrine (EPIPEN) 0 3 mg/0 3 mL SOAJ, Inject 0 3 mL (0 3 mg total) into a muscle once for 1 dose, Disp: 2 each, Rfl: 1  •  estradiol (ESTRACE VAGINAL) 0 1 mg/g vaginal cream, Insert 1 g into the vagina once a week Insert 1 gm 3x a week for 2 weeks then 1gm weekly (Patient not taking: Reported on 11/10/2022), Disp: 42 5 g, Rfl: 2  •  ondansetron (ZOFRAN-ODT) 4 mg disintegrating tablet, TAKE 1 TABLET BY MOUTH 3 TIMES A DAY AS NEEDED FOR NAUSEA (Patient not taking: Reported on 11/10/2022), Disp: , Rfl:     Patient Active Problem List   Diagnosis   • Anxiety   • Migraine headache   • Depression   • Vitamin D deficiency   • Chronic abdominal pain   • Adenomyosis   • Knee pain   • Osteoarthritis of right knee   • Pain in right knee   • Obesity, Class II, BMI 35-39 9   • BMI 35 0-35 9,adult       Objective:  /87   Pulse 81   Ht 5' 2" (1 575 m)   Wt 89 4 kg (197 lb)   LMP 05/08/2019   BMI 36 03 kg/m²     Right Ankle Exam     Tenderness   The patient is experiencing tenderness in the ATF and deltoid (PTT)  Swelling: mild    Range of Motion   The patient has normal right ankle ROM  Tests   Anterior drawer: positive  Varus tilt: positive    Other   Erythema: absent  Sensation: normal             Physical Exam      Neurologic Exam    Procedures    I have personally reviewed the written report of the pertinent studies

## 2023-03-29 ENCOUNTER — TELEPHONE (OUTPATIENT)
Dept: FAMILY MEDICINE CLINIC | Facility: CLINIC | Age: 58
End: 2023-03-29

## 2023-03-29 NOTE — TELEPHONE ENCOUNTER
Pt left vm stating that she had pass a blood clot while urinating and had pain in her left side, she said she had a hysterectomy years ago  Last time she had this happen her gyn told her to go to the ER  Spoke with Dr James Ruiz and and he send it would be best for her to go to the ER in case she needed imaging  Pt informed

## 2023-04-04 ENCOUNTER — TELEPHONE (OUTPATIENT)
Dept: UROLOGY | Facility: AMBULATORY SURGERY CENTER | Age: 58
End: 2023-04-04

## 2023-04-04 ENCOUNTER — OFFICE VISIT (OUTPATIENT)
Dept: FAMILY MEDICINE CLINIC | Facility: CLINIC | Age: 58
End: 2023-04-04

## 2023-04-04 VITALS
SYSTOLIC BLOOD PRESSURE: 130 MMHG | TEMPERATURE: 98.2 F | HEART RATE: 57 BPM | HEIGHT: 62 IN | DIASTOLIC BLOOD PRESSURE: 80 MMHG | BODY MASS INDEX: 36.55 KG/M2 | WEIGHT: 198.6 LBS | OXYGEN SATURATION: 99 %

## 2023-04-04 DIAGNOSIS — R31.0 GROSS HEMATURIA: Primary | ICD-10-CM

## 2023-04-04 DIAGNOSIS — J32.9 CHRONIC SINUSITIS, UNSPECIFIED LOCATION: ICD-10-CM

## 2023-04-04 LAB
SL AMB  POCT GLUCOSE, UA: NEGATIVE
SL AMB LEUKOCYTE ESTERASE,UA: ABNORMAL
SL AMB POCT BILIRUBIN,UA: NEGATIVE
SL AMB POCT BLOOD,UA: NEGATIVE
SL AMB POCT CLARITY,UA: CLEAR
SL AMB POCT COLOR,UA: YELLOW
SL AMB POCT KETONES,UA: NEGATIVE
SL AMB POCT NITRITE,UA: NEGATIVE
SL AMB POCT PH,UA: 7
SL AMB POCT SPECIFIC GRAVITY,UA: 1.02
SL AMB POCT URINE PROTEIN: NEGATIVE
SL AMB POCT UROBILINOGEN: 0.2

## 2023-04-04 RX ORDER — FLUTICASONE PROPIONATE 50 MCG
1 SPRAY, SUSPENSION (ML) NASAL DAILY
Qty: 48 G | Refills: 1 | Status: SHIPPED | OUTPATIENT
Start: 2023-04-04

## 2023-04-04 RX ORDER — CEPHALEXIN 500 MG/1
CAPSULE ORAL
COMMUNITY
Start: 2023-03-29

## 2023-04-04 NOTE — PROGRESS NOTES
Name: Mikki Setting      : 1965      MRN: [id number]  Encounter Provider: Milan Monroy DO  Encounter Date: 2023   Encounter department: [address] [place] [address]  Gross hematuria  Assessment & Plan:  Patient with recent gross hematuria but approximately 1 year history of passing debris in her urine on an intermittent basis  Negative CT scan in ER possibly after patient passed stone from ureter into the bladder though she notes possible passing of gravel since her ER visit  Recommend she continue antibiotics to complete her full course, will recheck urine culture and urine cytology  Will refer to urology for possible cystoscopy to follow-up on bladder symptoms  Orders:  -     Cytology, urine  -     Urine culture  -     Ambulatory Referral to Urology; Future    2  Chronic sinusitis, unspecified location  -     fluticasone (FLONASE) 50 mcg/act nasal spray; 1 spray into each nostril daily           Subjective      Patient presents for follow-up from recent emergency room visit  She was evaluated in [facility] emergency room for gross hematuria and left flank pain  Work-up at that time including CT scan showed no evidence of stones  Urinalysis was negative for culture though patient is still on empiric antibiotics  She is continue to have occasional small clots and some debris in her urine  She has lower abdominal mild discomfort and some left flank tenderness  Of note is that she states she has experienced occasional debris in her urine dating back close to 1 year  Review of Systems   Constitutional: Negative  Respiratory: Negative  Cardiovascular: Negative  Gastrointestinal: Negative  Genitourinary: Positive for flank pain, hematuria and pelvic pain  Psychiatric/Behavioral: Negative          Current Outpatient Medications on File Prior to Visit   Medication Sig   • Ascorbic Acid (VITAMIN C PO) Take by mouth   • cephalexin (KEFLEX) "500 mg capsule TAKE 1 CAPSULE BY MOUTH FOUR TIMES A DAY FOR 10 DAYS   • levocetirizine (XYZAL) 5 MG tablet Take 5 mg by mouth every evening   • LORazepam (ATIVAN) 0 5 mg tablet Take 1 tablet (0 5 mg total) by mouth as needed (prn)   • magnesium gluconate (MAGONATE) 500 mg tablet Take 500 mg by mouth 2 (two) times a day   • sertraline (ZOLOFT) 25 mg tablet Take 2 tablets (50 mg total) by mouth daily   • SUMAtriptan (IMITREX) 100 mg tablet Take 1 tablet (100 mg total) by mouth every 2 (two) hours as needed for migraine May repeat in 2 hours if needed   • [DISCONTINUED] fluticasone (FLONASE) 50 mcg/act nasal spray 1 spray into each nostril daily   • Cholecalciferol (VITAMIN D PO) Take 2,000 Units by mouth daily (Patient not taking: Reported on 4/4/2023)   • EPINEPHrine (EPIPEN) 0 3 mg/0 3 mL SOAJ Inject 0 3 mL (0 3 mg total) into a muscle once for 1 dose   • estradiol (ESTRACE VAGINAL) 0 1 mg/g vaginal cream Insert 1 g into the vagina once a week Insert 1 gm 3x a week for 2 weeks then 1gm weekly (Patient not taking: Reported on 11/10/2022)   • ondansetron (ZOFRAN-ODT) 4 mg disintegrating tablet TAKE 1 TABLET BY MOUTH 3 TIMES A DAY AS NEEDED FOR NAUSEA (Patient not taking: Reported on 11/10/2022)   • vitamin E, tocopherol, 400 units capsule Take 400 Units by mouth daily (Patient not taking: Reported on 4/4/2023)       Objective     /80 (BP Location: Right arm, Patient Position: Sitting, Cuff Size: Large)   Pulse 57   Temp 98 2 °F (36 8 °C)   Ht 5' 2 21\" (1 58 m)   Wt 90 1 kg (198 lb 9 6 oz)   LMP 05/08/2019   SpO2 99%   BMI 36 09 kg/m²     Physical Exam  Abdominal:      Tenderness: There is abdominal tenderness (Mild left lower quadrant abdominal discomfort with palpation)     Musculoskeletal:      Comments: Mild left CVA tenderness       Willy Nelson, DO  "

## 2023-04-04 NOTE — ASSESSMENT & PLAN NOTE
Patient with recent gross hematuria but approximately 1 year history of passing debris in her urine on an intermittent basis  Negative CT scan in ER possibly after patient passed stone from ureter into the bladder though she notes possible passing of gravel since her ER visit  Recommend she continue antibiotics to complete her full course, will recheck urine culture and urine cytology  Will refer to urology for possible cystoscopy to follow-up on bladder symptoms

## 2023-04-04 NOTE — TELEPHONE ENCOUNTER
Please Triage  New Patient    What is the reason for the patient’s appointment? Gross hematuria for about a week pt went to ED on Wednesday passing clots on and off for 18 months pt passed 3 small granules feeling bloated and tender on left side      What office location does the patient prefer? [place]    Imaging/Lab Results:    Do we accept the patient's insurance or is the patient Self-Pay? Yes     Insurance Provider: [address] [place] [address] Type/Number: [id number]  Member ID#: [id number]    Has the patient had any previous Urologist(s)? No     Have patient records been requested? If not are records showing in [address] [place] [address] Rd: records in [facility]     Has the patient had any outside testing done? [facility] CT with contrast 4/2/23    Does the patient have a personal history of cancer?  No     Pt call [phone]

## 2023-04-04 NOTE — TELEPHONE ENCOUNTER
"Called and spoke with patient  Reports last had blood in urine on Sunday  Reports having some discomfort in her low back/bladder area  No fever,chills, nausea or vomiting  Still taking antibiotics sent by ER  Pt reports no improvement in symptoms  Reports passing \"flesh like debris\" for over a year  Also said she passed a blood clot last week  Appt scheduled and ER precautions reviewed with patient     "

## 2023-04-06 LAB — BACTERIA UR CULT: NORMAL

## 2023-04-07 ENCOUNTER — TELEPHONE (OUTPATIENT)
Dept: FAMILY MEDICINE CLINIC | Facility: CLINIC | Age: 58
End: 2023-04-07

## 2023-04-07 NOTE — TELEPHONE ENCOUNTER
----- Message from Marilyn Novoa DO sent at 4/6/2023  6:02 PM EDT -----  Urine cytology test showed no abnormal cells    Recommend she go ahead and move forward with referral to urologist

## 2023-04-21 DIAGNOSIS — F32.9 REACTIVE DEPRESSION: ICD-10-CM

## 2023-04-24 ENCOUNTER — PROCEDURE VISIT (OUTPATIENT)
Dept: UROLOGY | Facility: MEDICAL CENTER | Age: 58
End: 2023-04-24

## 2023-04-24 VITALS
OXYGEN SATURATION: 98 % | SYSTOLIC BLOOD PRESSURE: 138 MMHG | BODY MASS INDEX: 36.21 KG/M2 | HEART RATE: 62 BPM | HEIGHT: 62 IN | DIASTOLIC BLOOD PRESSURE: 76 MMHG

## 2023-04-24 DIAGNOSIS — R31.0 GROSS HEMATURIA: Primary | ICD-10-CM

## 2023-04-24 LAB
SL AMB  POCT GLUCOSE, UA: ABNORMAL
SL AMB LEUKOCYTE ESTERASE,UA: ABNORMAL
SL AMB POCT BILIRUBIN,UA: ABNORMAL
SL AMB POCT BLOOD,UA: ABNORMAL
SL AMB POCT CLARITY,UA: CLEAR
SL AMB POCT COLOR,UA: YELLOW
SL AMB POCT KETONES,UA: ABNORMAL
SL AMB POCT NITRITE,UA: ABNORMAL
SL AMB POCT PH,UA: 7
SL AMB POCT SPECIFIC GRAVITY,UA: 1.02
SL AMB POCT URINE PROTEIN: ABNORMAL
SL AMB POCT UROBILINOGEN: 0.2

## 2023-04-24 RX ORDER — SERTRALINE HYDROCHLORIDE 25 MG/1
50 TABLET, FILM COATED ORAL DAILY
Qty: 90 TABLET | Refills: 0 | Status: SHIPPED | OUTPATIENT
Start: 2023-04-24

## 2023-04-24 NOTE — LETTER
April 24, 2023     Erin Faulkner DO  [address]  [address] [facility]    Patient: Rosi Barr   YOB: 1965   Date of Visit: 4/24/2023       Dear Dr Salomon Gone: Thank you for referring Amparosebastian Francis to me for evaluation  Below are my notes for this consultation  If you have questions, please do not hesitate to call me  I look forward to following your patient along with you           Sincerely,        Coretta Nielson MD        CC: No Recipients

## 2023-04-24 NOTE — PROGRESS NOTES
Cystoscopy     Date/Time 4/24/2023 3:30 PM     Performed by  Caprice Moya MD     Authorized by Caprice Moya MD      Universal Protocol:  Consent: Verbal consent obtained  Written consent obtained  Risks and benefits: risks, benefits and alternatives were discussed  Consent given by: patient  Patient understanding: patient states understanding of the procedure being performed  Patient consent: the patient's understanding of the procedure matches consent given  Procedure consent: procedure consent matches procedure scheduled  Patient identity confirmed: verbally with patient        Procedure Details:  Procedure type: cystoscopy    Patient tolerance: Patient tolerated the procedure well with no immediate complications    Additional Procedure Details: Cystoscopy Procedure Note        Pre-operative Diagnosis: Hematuria    Post-operative Diagnosis: Normal bladder        Procedure Details   The risks, benefits, complications, treatment options, and expected outcomes were discussed with the patient  The patient concurred with the proposed plan, giving informed consent  Cystoscopy was performed today under local anesthesia, using sterile technique  The patient was placed in the lithotomy  position, prepped and draped in the usual sterile fashion  A 15 [place] flexible cystoscope  was used to inspect both the urethra and bladder  Findings: Normal bladder and ureteral orifices  Urethra is unremarkable  Specimens: Trace blood is noted on dipstick  Urine culture and urine cytology were normal       Discussion: Since her last visit she has been voiding adequately without hematuria  She does note that she feels bloated   She has had a prior CT with contrast but this did not have delayed images  In light of the history of gross hematuria  we did decide to obtain a  renal protocol CT to further exclude any upper tract pathology  Cystoscopy is unremarkable today

## 2023-04-27 ENCOUNTER — NURSE TRIAGE (OUTPATIENT)
Dept: OTHER | Facility: OTHER | Age: 58
End: 2023-04-27

## 2023-04-27 NOTE — TELEPHONE ENCOUNTER
"Patient called in to report symptoms starting yesterday 4/26 that include moderate to severe left back/flank pain, lower abdominal pressure and bloating, urgency with little output, chills yesterday with temp 99 0 F tympanic  Post cystoscopy on 4/24  CT renal scheduled for 5/5/23  Please follow up with patient  Reason for Disposition  • SEVERE post-op pain (e g , excruciating, pain scale 8-10) that is not controlled with pain medications    Answer Assessment - Initial Assessment Questions  1  SYMPTOM: \"What's the main symptom you're concerned about? \" (e g , pain, fever, vomiting)      Left back/flank pain radiating to front left; increased abdominal pressure and bloating, urgency with decreased urine output; chills yesterday (99 0 F tympanic)  2  ONSET: \"When did symptoms start? \"      4/26/23  3  SURGERY: \"What surgery was performed? \"      Cystoscopy  4  DATE of SURGERY: \"When was surgery performed? \"       4/24/23  5  ANESTHESIA: \" What type of anesthesia did you have? \" (e g , general, spinal, epidural, local)      *Local  6  PAIN: \"Is there any pain? \" If Yes, ask: \"How bad is it? \"  (Scale 1-10; or mild, moderate, severe)      Moderate to severe (7-8)  7  FEVER: \"Do you have a fever? \" If Yes, ask: \"What is your temperature, how was it measured, and when did it start? \"      Temp 99 0 with chills  8  VOMITING: \"Is there any vomiting? \" If yes, ask: \"How many times? \"     Denies  9  BLEEDING: \"Is there any bleeding? \" If Yes, ask: \"How much? \" and \"Where? \"      Denies blood in urine;   10  OTHER SYMPTOMS: \"Do you have any other symptoms? \" (e g , drainage from wound, painful urination, constipation)       Denies    Protocols used: POST-OP SYMPTOMS AND QUESTIONS-ADULT-[place]    "

## 2023-04-27 NOTE — TELEPHONE ENCOUNTER
"Regarding: Severe back pain and urine frequency  ----- Message from Zaida Crowley sent at 4/27/2023 11:16 AM EDT -----  Dilcia Esparza had a procedure done a few days ago and now I feel like I have a bladder infection or a kidney stone  Yesterday I had really bad back pain on my left side and now it's moved to the front  I'm so bloated and I'm running to the bathroom to urinate but not going a whole lot and I've been drinking water  \"    "

## 2023-04-28 ENCOUNTER — HOSPITAL ENCOUNTER (OUTPATIENT)
Dept: CT IMAGING | Facility: HOSPITAL | Age: 58
Discharge: HOME | End: 2023-04-28

## 2023-04-28 DIAGNOSIS — R31.0 GROSS HEMATURIA: Primary | ICD-10-CM

## 2023-04-28 DIAGNOSIS — R31.0 GROSS HEMATURIA: ICD-10-CM

## 2023-04-28 RX ADMIN — IOHEXOL 100 ML: 350 INJECTION, SOLUTION INTRAVENOUS at 13:14

## 2023-04-28 NOTE — TELEPHONE ENCOUNTER
I spoke with the patient and let her know we changed the order to STAT  Provided her central scheduling number to call and schedule this ASAP

## 2023-05-01 NOTE — RESULT ENCOUNTER NOTE
Inform pt that the  test was normal   The kidneys and ureters are normal   The remainder the abdomen is unremarkable  If she continues to feel bloated she should discuss with her family doctor  Keep usual follow up appt

## 2023-05-02 ENCOUNTER — TELEPHONE (OUTPATIENT)
Dept: UROLOGY | Facility: MEDICAL CENTER | Age: 58
End: 2023-05-02

## 2023-05-02 NOTE — TELEPHONE ENCOUNTER
----- Message from Patricia Hook MD sent at 5/1/2023 10:57 AM EDT -----  Inform pt that the  test was normal   The kidneys and ureters are normal   The remainder the abdomen is unremarkable  If she continues to feel bloated she should discuss with her family doctor  Keep usual follow up appt

## 2023-05-15 DIAGNOSIS — Z12.31 ENCOUNTER FOR SCREENING MAMMOGRAM FOR MALIGNANT NEOPLASM OF BREAST: ICD-10-CM

## 2023-06-21 DIAGNOSIS — F32.9 REACTIVE DEPRESSION: ICD-10-CM

## 2023-06-21 RX ORDER — SERTRALINE HYDROCHLORIDE 25 MG/1
TABLET, FILM COATED ORAL
Qty: 90 TABLET | Refills: 7 | Status: SHIPPED | OUTPATIENT
Start: 2023-06-21

## 2023-08-29 ENCOUNTER — OFFICE VISIT (OUTPATIENT)
Dept: FAMILY MEDICINE CLINIC | Facility: CLINIC | Age: 58
End: 2023-08-29
Payer: COMMERCIAL

## 2023-08-29 VITALS
HEIGHT: 62 IN | HEART RATE: 71 BPM | BODY MASS INDEX: 36.62 KG/M2 | OXYGEN SATURATION: 96 % | WEIGHT: 199 LBS | TEMPERATURE: 97.5 F | RESPIRATION RATE: 16 BRPM | DIASTOLIC BLOOD PRESSURE: 86 MMHG | SYSTOLIC BLOOD PRESSURE: 128 MMHG

## 2023-08-29 DIAGNOSIS — Z13.29 SCREENING FOR THYROID DISORDER: ICD-10-CM

## 2023-08-29 DIAGNOSIS — Z13.1 SCREENING FOR DIABETES MELLITUS: ICD-10-CM

## 2023-08-29 DIAGNOSIS — Z13.0 SCREENING FOR DEFICIENCY ANEMIA: ICD-10-CM

## 2023-08-29 DIAGNOSIS — Z00.00 ANNUAL PHYSICAL EXAM: Primary | ICD-10-CM

## 2023-08-29 DIAGNOSIS — Z13.6 SCREENING FOR CARDIOVASCULAR CONDITION: ICD-10-CM

## 2023-08-29 DIAGNOSIS — F32.9 REACTIVE DEPRESSION: ICD-10-CM

## 2023-08-29 DIAGNOSIS — J32.9 CHRONIC SINUSITIS, UNSPECIFIED LOCATION: ICD-10-CM

## 2023-08-29 PROCEDURE — 99396 PREV VISIT EST AGE 40-64: CPT | Performed by: FAMILY MEDICINE

## 2023-08-29 RX ORDER — SERTRALINE HYDROCHLORIDE 25 MG/1
50 TABLET, FILM COATED ORAL DAILY
Qty: 90 TABLET | Refills: 1 | Status: SHIPPED | OUTPATIENT
Start: 2023-08-29

## 2023-08-29 RX ORDER — FLUTICASONE PROPIONATE 50 MCG
1 SPRAY, SUSPENSION (ML) NASAL DAILY
Qty: 48 G | Refills: 1 | Status: SHIPPED | OUTPATIENT
Start: 2023-08-29
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
## 2023-10-24 ENCOUNTER — AMB VIDEO VISIT (OUTPATIENT)
Dept: OTHER | Facility: HOSPITAL | Age: 58
End: 2023-10-24

## 2023-10-24 DIAGNOSIS — R50.9 FEVER, UNSPECIFIED FEVER CAUSE: ICD-10-CM

## 2023-10-24 DIAGNOSIS — R05.1 ACUTE COUGH: Primary | ICD-10-CM

## 2023-10-24 PROBLEM — M25.569 KNEE PAIN: Status: RESOLVED | Noted: 2021-10-25 | Resolved: 2023-10-24

## 2023-10-24 PROBLEM — M25.561 PAIN IN RIGHT KNEE: Status: RESOLVED | Noted: 2020-12-09 | Resolved: 2023-10-24

## 2023-10-24 PROBLEM — R31.0 GROSS HEMATURIA: Status: RESOLVED | Noted: 2023-04-04 | Resolved: 2023-10-24

## 2023-10-24 PROCEDURE — ECARE PR SL URGENT CARE VIRTUAL VISIT: Performed by: PHYSICIAN ASSISTANT

## 2023-10-24 RX ORDER — AMOXICILLIN AND CLAVULANATE POTASSIUM 875; 125 MG/1; MG/1
1 TABLET, FILM COATED ORAL 2 TIMES DAILY
Qty: 20 TABLET | Refills: 0 | Status: SHIPPED | OUTPATIENT
Start: 2023-10-24 | End: 2023-11-03

## 2023-10-24 RX ORDER — BENZONATATE 200 MG/1
200 CAPSULE ORAL 3 TIMES DAILY PRN
Qty: 20 CAPSULE | Refills: 0 | Status: SHIPPED | OUTPATIENT
Start: 2023-10-24

## 2023-10-24 NOTE — LETTER
October 24, 2023     Patient: Alverto Nobles   YOB: 1965   Date of Visit: 10/24/2023       To Whom it May Concern:    Minerva Zelaya was seen and evaluated virtually on 10/24/2023 and is being tested for COVID/Flu. Please note if COVID and Flu tests are negative, She may return to work when fever free for 24 hours without the use of a fever reducing agent. If COVID or Flu test is positive, She may return on 10/27/23 if fever free for 24 hours, as this is 5 days from the onset of symptoms. Upon return, She must then adhere to strict masking for an additional 5 days. If you have any questions or concerns, please don't hesitate to call.          Sincerely,          Elizabeth Feng PA-C        CC: No Recipients

## 2023-10-24 NOTE — PATIENT INSTRUCTIONS
COVID/flu testing at outpatient lab  If negative, go for Chest xray  If positive, check oxygen levels and call us or your PCP to discuss antivirals    Schedule a follow-up appointment with your primary care physician for recheck in 2-3 days. If you cannot see your PCP, you can schedule a follow up appointment at a [facility]. Go to the emergency department if you develop any new or worsening symptoms including shortness of breath, chest pain, or anything else that is concerning. Excuses can be found in "Letters" section of [facility]. Can print if opened from a [employer] phone number is [phone] if you need assistance or have further questions    1 21 [phone] ([phone])  Schedule or Reschedule Outpatient Testing - Option 2  Billing - Option 3  General Info - Option 4  [facility] Help - Option 5  Comprehensive Spine Program - Option 6   COVID - Option 7    Go to the ER for new worsening or concerning symptoms including but not limited to shortness of breath or chest pain    You can have fever for 3-5 days with a viral illness and then any additional symptoms that develop (congestion,cough, nausea, diarrhea) can last for another 7 days. Stay out of work/school until fever free for 24 hours without use of tylenol or motrin     Make sure you have a thermometer and if you feel chills or sweats check it and write it down. Take Tylenol (acetaminophen) and Motrin (ibuprofen) for fevers, body aches, and headaches. Alternate Motrin and Tylenol every 3 hours for more consistent relief. Drink plenty of fluids to stay well hydrated- at least 2 L per day  Water, Hot water with lemon or honey, warm tea. Can drink Pedialyte, Gatorade/Powerade zero, but should mix with water. Milk or juice can make diarrhea worse.      For diarrhea, vomiting and abdominal pain follow BRAT diet (Bananas, Rice, Applesauce, Toast), small frequent meals      Use over-the-counter saline nasal spray/allergy medication for congestion, runny nose, and postnasal drip  Mucinex (guaifenesin) helps to clear mucous. Delsym (dextromethorphan) is a cough suppressant. Decongestants (pseudoephedrine or phenylephrine)  Vicks to the front/back of the chest bottom of the feet with socks      For sore throat relief  Warm salt water gargles, warm tea with honey as needed  Cool mist humidifier at bedside  Chloraseptic spray or throat lozenges with benzocaine (cepacol max strength).

## 2023-10-24 NOTE — PROGRESS NOTES
Video Visit - Renee Yao 59 y.o. female MRN: [id number]    REQUIRED DOCUMENTATION:         1. This service was provided via [employer]. 2. Provider located at [address] [place] [id number] [phone].  3. [facility] provider: Timmy Guerrero PA-C.  4. Identify all parties in room with patient during [facility] visit:  No one else  5. After connecting through [employer], patient was identified by name and date of birth. Patient was then informed that this was a Telemedicine visit and that the exam was being conducted confidentially over secure lines. My office door was closed. No one else was in the room. Patient acknowledged consent and understanding of privacy and security of the Telemedicine visit. I informed the patient that I have reviewed their record in Epic and presented the opportunity for them to ask any questions regarding the visit today. The patient agreed to participate. VITALS: Heart Rate: 88 BPM, Respiratory Rate: 20 RPM, Temperature  102.4 ° F, Blood Pressure Unavailable mmHg, Pulse Ox Unavailable % on RA    HPI  Pt feels like she did when she had COVID before. Did COVID test x 2, negative. Sx started Sunday- fever, nasal congestion, ear pain, cough productive of clear sputum with post-tussive emesis x 2, CP. Took delsym, mucinex-DM without relief. Brother in law had COVID last week. No SOB with or without exertion. Physical Exam  Constitutional:       General: She is not in acute distress. Appearance: Normal appearance. She is not toxic-appearing. HENT:      Head: Normocephalic and atraumatic. Nose: No rhinorrhea. Mouth/Throat:      Mouth: Mucous membranes are moist.   Eyes:      Conjunctiva/sclera: Conjunctivae normal.   Cardiovascular:      Rate and Rhythm: Normal rate. Pulmonary:      Effort: Pulmonary effort is normal. No respiratory distress. Breath sounds:  No wheezing (no gross audible wheeze through computer). Musculoskeletal:      Cervical back: Normal range of motion. Skin:     Findings: No rash (on face or neck). Neurological:      Mental Status: She is alert. Cranial Nerves: No dysarthria or facial asymmetry. Psychiatric:         Mood and Affect: Mood normal.         Behavior: Behavior normal.       With fever 102, doubt sx are from sinusitis. More concerned for pna or ear infection. Will empirically start on augmentin. Will triple check covid/flu PCR test given recent exposure. Not feeling SOB, only risk factor is BMI 36. If COVID positive and oxygen okay, likely will not need antivirals. Diagnoses and all orders for this visit:    Acute cough  -     Covid/Flu- Lab Collect  -     XR chest pa & lateral; Future  -     amoxicillin-clavulanate (AUGMENTIN) 875-125 mg per tablet; Take 1 tablet by mouth 2 (two) times a day for 10 days  -     benzonatate (TESSALON) 200 MG capsule; Take 1 capsule (200 mg total) by mouth 3 (three) times a day as needed for cough    Fever, unspecified fever cause  -     benzonatate (TESSALON) 200 MG capsule; Take 1 capsule (200 mg total) by mouth 3 (three) times a day as needed for cough      Patient Instructions   COVID/flu testing at outpatient lab  If negative, go for Chest xray  If positive, check oxygen levels and call us or your PCP to discuss antivirals    Schedule a follow-up appointment with your primary care physician for recheck in 2-3 days. If you cannot see your PCP, you can schedule a follow up appointment at a [facility]. Go to the emergency department if you develop any new or worsening symptoms including shortness of breath, chest pain, or anything else that is concerning. Excuses can be found in "Letters" section of [employer] [facility].  Can print if opened from a [address] phone number is [phone] if you need assistance or have further questions    [phone]  [phone]  Schedule or Reschedule Outpatient Testing - Option 2  Billing - Option 3  General Info - Option 4  MyChart Help - Option 5  Comprehensive Spine Program - Option 6   COVID - Option 7    Go to the ER for new worsening or concerning symptoms including but not limited to shortness of breath or chest pain    You can have fever for 3-5 days with a viral illness and then any additional symptoms that develop (congestion,cough, nausea, diarrhea) can last for another 7 days. Stay out of work/school until fever free for 24 hours without use of tylenol or motrin     Make sure you have a thermometer and if you feel chills or sweats check it and write it down. Take Tylenol (acetaminophen) and Motrin (ibuprofen) for fevers, body aches, and headaches. Alternate Motrin and Tylenol every 3 hours for more consistent relief. Drink plenty of fluids to stay well hydrated- at least 2 L per day  Water, Hot water with lemon or honey, warm tea. Can drink Pedialyte, Gatorade/Powerade zero, but should mix with water. Milk or juice can make diarrhea worse. For diarrhea, vomiting and abdominal pain follow BRAT diet (Bananas, Rice, Applesauce, Toast), small frequent meals      Use over-the-counter saline nasal spray/allergy medication for congestion, runny nose, and postnasal drip  Mucinex (guaifenesin) helps to clear mucous. Delsym (dextromethorphan) is a cough suppressant. Decongestants (pseudoephedrine or phenylephrine)  Vicks to the front/back of the chest bottom of the feet with socks      For sore throat relief  Warm salt water gargles, warm tea with honey as needed  Cool mist humidifier at bedside  Chloraseptic spray or throat lozenges with benzocaine (cepacol max strength).

## 2023-10-25 ENCOUNTER — APPOINTMENT (OUTPATIENT)
Dept: LAB | Facility: CLINIC | Age: 58
End: 2023-10-25
Payer: COMMERCIAL

## 2023-10-25 PROCEDURE — 87636 SARSCOV2 & INF A&B AMP PRB: CPT | Performed by: PHYSICIAN ASSISTANT

## 2023-10-26 LAB
FLUAV RNA RESP QL NAA+PROBE: NEGATIVE
FLUBV RNA RESP QL NAA+PROBE: NEGATIVE
SARS-COV-2 RNA RESP QL NAA+PROBE: NEGATIVE

## 2023-10-27 ENCOUNTER — APPOINTMENT (OUTPATIENT)
Dept: RADIOLOGY | Facility: CLINIC | Age: 58
End: 2023-10-27
Payer: COMMERCIAL

## 2023-10-27 DIAGNOSIS — R05.1 ACUTE COUGH: ICD-10-CM

## 2023-10-27 PROCEDURE — 71046 X-RAY EXAM CHEST 2 VIEWS: CPT

## 2023-11-01 ENCOUNTER — TELEPHONE (OUTPATIENT)
Dept: FAMILY MEDICINE CLINIC | Facility: CLINIC | Age: 58
End: 2023-11-01

## 2023-11-01 ENCOUNTER — OFFICE VISIT (OUTPATIENT)
Dept: URGENT CARE | Facility: CLINIC | Age: 58
End: 2023-11-01
Payer: COMMERCIAL

## 2023-11-01 VITALS
HEIGHT: 62 IN | WEIGHT: 192 LBS | BODY MASS INDEX: 35.33 KG/M2 | RESPIRATION RATE: 18 BRPM | TEMPERATURE: 96.9 F | HEART RATE: 69 BPM | OXYGEN SATURATION: 98 % | DIASTOLIC BLOOD PRESSURE: 93 MMHG | SYSTOLIC BLOOD PRESSURE: 137 MMHG

## 2023-11-01 DIAGNOSIS — J32.9 SINOBRONCHITIS: Primary | ICD-10-CM

## 2023-11-01 DIAGNOSIS — J40 SINOBRONCHITIS: Primary | ICD-10-CM

## 2023-11-01 PROCEDURE — 99213 OFFICE O/P EST LOW 20 MIN: CPT | Performed by: PHYSICIAN ASSISTANT

## 2023-11-01 RX ORDER — AZITHROMYCIN 250 MG/1
TABLET, FILM COATED ORAL
Qty: 6 TABLET | Refills: 0 | Status: SHIPPED | OUTPATIENT
Start: 2023-11-01 | End: 2023-11-05

## 2023-11-01 RX ORDER — BENZONATATE 200 MG/1
200 CAPSULE ORAL 3 TIMES DAILY PRN
Qty: 20 CAPSULE | Refills: 0 | Status: SHIPPED | OUTPATIENT
Start: 2023-11-01

## 2023-11-01 RX ORDER — PREDNISONE 20 MG/1
TABLET ORAL
Qty: 10 TABLET | Refills: 0 | Status: SHIPPED | OUTPATIENT
Start: 2023-11-01

## 2023-11-01 NOTE — PATIENT INSTRUCTIONS
This could be persistent viral syndrome however with your past medical history will cover with different kind of antibiotic. Take as instructed. You may continue your DayQuil and NyQuil as well as your Flonase. Stay well-hydrated. Vaporizer by the bedside may be helpful. Take prednisone as instructed. While on prednisone do not take any ibuprofen or ibuprofen like products. May safely take Tylenol if needed. Follow-up with primary care if not improving over the next 7 to 10 days. If you would develop any profound weakness, shortness of breath, chest pain proceed to emergency room immediately for further evaluation.

## 2023-11-01 NOTE — TELEPHONE ENCOUNTER
LVM for pt that Dr. Micaela Rosen recommends that she be evaluated and due to no appt availability advised UC.

## 2023-11-01 NOTE — TELEPHONE ENCOUNTER
Tue 10/31/2023 9:39 AM    Good morning. My name is Damari Borrero. My last name is spelled G as in girl EN as in [facility] birthdate 1965. I'm calling because I've been sick with whatever since last Sunday. So today's like I believe the 10th day when I counted and I did do a video visit last Tuesday evening. What are actually Monday evenings? No Tuesday evening. Sorry. With online and the practitioner has given me Augmentin to take and something for my cough that I've been taking but I'm still sick still running a low grade fever. My fever at the highest point was 102.4 now it's down to 100.1. I had a COVID test tested negative flu test tested, negative test. X-ray. My lungs are clear. I wasn't sure if there was a virus going around similar to this, if I needed to come in and be checked, et Crystal Borrero. So if you could please give me a call back. My phone number is [phone]. Thanks so much. Bye, bye. You received a voice mail from [facility].
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
## 2023-11-01 NOTE — TELEPHONE ENCOUNTER
Recommend patient be checked in person. Any provider first available. If absolutely unavailable, recommend urgent care evaluation downstairs.

## 2023-11-06 ENCOUNTER — TELEPHONE (OUTPATIENT)
Dept: FAMILY MEDICINE CLINIC | Facility: CLINIC | Age: 58
End: 2023-11-06

## 2023-11-06 NOTE — TELEPHONE ENCOUNTER
Pt left voicemail stating:    Doctor Benita Arizmendi who had mentioned that I should go for a recheck to Urgent Care last Wednesday, which I did. I'm still running a fever as of today, Monday November 6th, low grade 99, anywhere from 99.9 to 100.1. Today was my last day on the current medication which urgent care put me on 1st. I was out on an Augmentin, which I was on for a little over a week. Urgent care switched it to the Z pack, which today was my last day on that, as well as Prednisone which has helped you know, to clear out, you know, my chest, etcetera. But I'm still running a fever now for two weeks, so I thought you should be aware. Wasn't sure if I should come in to be seen. I am staying home. I haven't been out know around anybody. Excuse me. Excuse me. You know as far as like being contagious Know 24 hours fever free because I haven't been. But I didn't know. I do have blood work that I'm supposed to get.  So I didn't know if I should go get this blood work if anything else needed to be done

## 2023-11-06 NOTE — TELEPHONE ENCOUNTER
I would recommend an office visit. Hold off on getting the blood work done until seen in the office because we may want to order additional labs based on these ongoing symptoms.

## 2023-11-08 ENCOUNTER — OFFICE VISIT (OUTPATIENT)
Dept: FAMILY MEDICINE CLINIC | Facility: CLINIC | Age: 58
End: 2023-11-08
Payer: COMMERCIAL

## 2023-11-08 VITALS
HEART RATE: 68 BPM | DIASTOLIC BLOOD PRESSURE: 76 MMHG | OXYGEN SATURATION: 99 % | TEMPERATURE: 97.6 F | SYSTOLIC BLOOD PRESSURE: 122 MMHG | BODY MASS INDEX: 36.03 KG/M2 | WEIGHT: 197 LBS

## 2023-11-08 DIAGNOSIS — H65.02 NON-RECURRENT ACUTE SEROUS OTITIS MEDIA OF LEFT EAR: ICD-10-CM

## 2023-11-08 DIAGNOSIS — J01.00 ACUTE NON-RECURRENT MAXILLARY SINUSITIS: Primary | ICD-10-CM

## 2023-11-08 PROCEDURE — 99213 OFFICE O/P EST LOW 20 MIN: CPT | Performed by: FAMILY MEDICINE

## 2023-11-08 RX ORDER — CEFUROXIME AXETIL 500 MG/1
500 TABLET ORAL EVERY 12 HOURS SCHEDULED
Qty: 20 TABLET | Refills: 0 | Status: SHIPPED | OUTPATIENT
Start: 2023-11-08 | End: 2023-11-18

## 2023-11-08 NOTE — ASSESSMENT & PLAN NOTE
Exam c/w early otitis media on left; advised Ceftin x 10 days given previous abx; reviewed OTC symptom relief and nasal saline irrigation; if continued fevers will need labs; f/u guidance given; recent CXR clear and lung exam WNL

## 2023-11-08 NOTE — PROGRESS NOTES
Assessment/Plan:     1. Acute non-recurrent maxillary sinusitis  Assessment & Plan:  Exam c/w early otitis media on left; advised Ceftin x 10 days given previous abx; reviewed OTC symptom relief and nasal saline irrigation; if continued fevers will need labs; f/u guidance given; recent CXR clear and lung exam WNL    Orders:  -     cefuroxime (CEFTIN) 500 mg tablet; Take 1 tablet (500 mg total) by mouth every 12 (twelve) hours for 10 days    2. Non-recurrent acute serous otitis media of left ear          Subjective:      Patient ID: Dung Mayen is a 59 y.o. female. Upper Respiratory Infection  Patient complains of symptoms of a URI,  ear pain. Symptoms include left ear pressure/pain, congestion, fever persistent, and nasal congestion. Onset of symptoms was a few weeks ago, and has been unchanged since that time. Treatment to date: antibiotics and steroids. Started October 22nd. Patient has been on steroids and azithromycin. Fevers had improved on Saturday but then had temp yesterday of 100.5. Starting to get pain on left side of face and ear pain. CXR was clear. The following portions of the patient's history were reviewed and updated as appropriate: allergies, current medications, past family history, past medical history, past social history, past surgical history, and problem list.    Review of Systems   Constitutional:  Positive for fever. Negative for chills. HENT:  Positive for congestion, ear pain, sinus pressure and sore throat. Respiratory:  Negative for chest tightness, shortness of breath and wheezing. Objective:      /76 (BP Location: Right arm, Patient Position: Sitting, Cuff Size: Large)   Pulse 68   Temp 97.6 °F (36.4 °C) (Tympanic)   Wt 89.4 kg (197 lb)   LMP 05/08/2019   SpO2 99%   BMI 36.03 kg/m²          Physical Exam  Vitals reviewed. Constitutional:       General: She is not in acute distress. Appearance: Normal appearance.  She is not ill-appearing, toxic-appearing or diaphoretic. HENT:      Head: Normocephalic and atraumatic. Right Ear: Hearing, tympanic membrane, ear canal and external ear normal.      Left Ear: A middle ear effusion is present. Tympanic membrane is erythematous. Nose: Mucosal edema present. Right Turbinates: Enlarged. Left Turbinates: Enlarged. Right Sinus: Maxillary sinus tenderness present. Left Sinus: Maxillary sinus tenderness present. Eyes:      General: No scleral icterus. Right eye: No discharge. Left eye: No discharge. Conjunctiva/sclera: Conjunctivae normal.   Cardiovascular:      Rate and Rhythm: Normal rate and regular rhythm. Pulses: Normal pulses. Heart sounds: Normal heart sounds. No murmur heard. No gallop. Pulmonary:      Effort: Pulmonary effort is normal. No respiratory distress. Breath sounds: Normal breath sounds. No stridor. No wheezing, rhonchi or rales. Musculoskeletal:      Right lower leg: No edema. Left lower leg: No edema. Lymphadenopathy:      Cervical: Cervical adenopathy present. Neurological:      General: No focal deficit present. Mental Status: She is alert and oriented to person, place, and time. Psychiatric:         Mood and Affect: Mood normal.         Behavior: Behavior normal.         Thought Content:  Thought content normal.         Judgment: Judgment normal.

## 2023-11-10 ENCOUNTER — TELEPHONE (OUTPATIENT)
Dept: FAMILY MEDICINE CLINIC | Facility: CLINIC | Age: 58
End: 2023-11-10

## 2023-11-10 DIAGNOSIS — M43.9 COMPRESSION DEFORMITY OF VERTEBRA: ICD-10-CM

## 2023-11-10 DIAGNOSIS — R05.1 ACUTE COUGH: ICD-10-CM

## 2023-11-10 DIAGNOSIS — R50.9 FEVER, UNSPECIFIED FEVER CAUSE: Primary | ICD-10-CM

## 2023-11-10 NOTE — TELEPHONE ENCOUNTER
She would be considered contagious as long as she has a fever. Typically, we consider someone contagious until they are fever free for 24 hours. I would also like her to repeat a chest x-ray and urine studies, I also added a few more labs.

## 2023-11-10 NOTE — TELEPHONE ENCOUNTER
Patient called to let you know that her fever is 99.2. She wanted to know if you would like her to go for blood work.

## 2023-11-10 NOTE — TELEPHONE ENCOUNTER
Made patient aware she has labs. Patient states she has a temperature of 100 now. Patient would like to know if she is contagious? She will be leaving Sunday to go on a trip ans would like to know. She will go to have the bloodwork done [facility].      Thank you

## 2023-11-10 NOTE — TELEPHONE ENCOUNTER
I would not consider this a fever although may be elevated from her baseline. I did order labs for her to complete and would like her to continue abx as ordered at visit.

## 2023-11-11 ENCOUNTER — APPOINTMENT (OUTPATIENT)
Dept: RADIOLOGY | Facility: MEDICAL CENTER | Age: 58
End: 2023-11-11
Payer: COMMERCIAL

## 2023-11-11 ENCOUNTER — APPOINTMENT (OUTPATIENT)
Dept: LAB | Facility: MEDICAL CENTER | Age: 58
End: 2023-11-11
Payer: COMMERCIAL

## 2023-11-11 DIAGNOSIS — R50.9 FEVER, UNSPECIFIED FEVER CAUSE: ICD-10-CM

## 2023-11-11 DIAGNOSIS — Z13.29 SCREENING FOR THYROID DISORDER: ICD-10-CM

## 2023-11-11 DIAGNOSIS — Z13.0 SCREENING FOR DEFICIENCY ANEMIA: ICD-10-CM

## 2023-11-11 DIAGNOSIS — Z13.1 SCREENING FOR DIABETES MELLITUS: ICD-10-CM

## 2023-11-11 DIAGNOSIS — Z13.6 SCREENING FOR CARDIOVASCULAR CONDITION: ICD-10-CM

## 2023-11-11 DIAGNOSIS — R05.1 ACUTE COUGH: ICD-10-CM

## 2023-11-11 LAB
ALBUMIN SERPL BCP-MCNC: 4.4 G/DL (ref 3.5–5)
ALP SERPL-CCNC: 58 U/L (ref 34–104)
ALT SERPL W P-5'-P-CCNC: 28 U/L (ref 7–52)
ANION GAP SERPL CALCULATED.3IONS-SCNC: 9 MMOL/L
AST SERPL W P-5'-P-CCNC: 18 U/L (ref 13–39)
BASOPHILS # BLD AUTO: 0.03 THOUSANDS/ÂΜL (ref 0–0.1)
BASOPHILS NFR BLD AUTO: 1 % (ref 0–1)
BILIRUB SERPL-MCNC: 1.26 MG/DL (ref 0.2–1)
BILIRUB UR QL STRIP: NEGATIVE
BUN SERPL-MCNC: 16 MG/DL (ref 5–25)
CALCIUM SERPL-MCNC: 9.8 MG/DL (ref 8.4–10.2)
CHLORIDE SERPL-SCNC: 103 MMOL/L (ref 96–108)
CHOLEST SERPL-MCNC: 186 MG/DL
CLARITY UR: CLEAR
CO2 SERPL-SCNC: 25 MMOL/L (ref 21–32)
COLOR UR: COLORLESS
CREAT SERPL-MCNC: 0.76 MG/DL (ref 0.6–1.3)
EOSINOPHIL # BLD AUTO: 0.11 THOUSAND/ÂΜL (ref 0–0.61)
EOSINOPHIL NFR BLD AUTO: 2 % (ref 0–6)
ERYTHROCYTE [DISTWIDTH] IN BLOOD BY AUTOMATED COUNT: 13.8 % (ref 11.6–15.1)
GFR SERPL CREATININE-BSD FRML MDRD: 85 ML/MIN/1.73SQ M
GLUCOSE P FAST SERPL-MCNC: 95 MG/DL (ref 65–99)
GLUCOSE UR STRIP-MCNC: NEGATIVE MG/DL
HCT VFR BLD AUTO: 44.2 % (ref 34.8–46.1)
HDLC SERPL-MCNC: 74 MG/DL
HGB BLD-MCNC: 14.7 G/DL (ref 11.5–15.4)
HGB UR QL STRIP.AUTO: NEGATIVE
IMM GRANULOCYTES # BLD AUTO: 0.01 THOUSAND/UL (ref 0–0.2)
IMM GRANULOCYTES NFR BLD AUTO: 0 % (ref 0–2)
KETONES UR STRIP-MCNC: NEGATIVE MG/DL
LDLC SERPL CALC-MCNC: 88 MG/DL (ref 0–100)
LEUKOCYTE ESTERASE UR QL STRIP: NEGATIVE
LYMPHOCYTES # BLD AUTO: 1.58 THOUSANDS/ÂΜL (ref 0.6–4.47)
LYMPHOCYTES NFR BLD AUTO: 31 % (ref 14–44)
MCH RBC QN AUTO: 30.5 PG (ref 26.8–34.3)
MCHC RBC AUTO-ENTMCNC: 33.3 G/DL (ref 31.4–37.4)
MCV RBC AUTO: 92 FL (ref 82–98)
MONOCYTES # BLD AUTO: 0.44 THOUSAND/ÂΜL (ref 0.17–1.22)
MONOCYTES NFR BLD AUTO: 9 % (ref 4–12)
NEUTROPHILS # BLD AUTO: 2.9 THOUSANDS/ÂΜL (ref 1.85–7.62)
NEUTS SEG NFR BLD AUTO: 57 % (ref 43–75)
NITRITE UR QL STRIP: NEGATIVE
NRBC BLD AUTO-RTO: 0 /100 WBCS
PH UR STRIP.AUTO: 7 [PH]
PLATELET # BLD AUTO: 362 THOUSANDS/UL (ref 149–390)
PMV BLD AUTO: 11.9 FL (ref 8.9–12.7)
POTASSIUM SERPL-SCNC: 3.9 MMOL/L (ref 3.5–5.3)
PROT SERPL-MCNC: 7.3 G/DL (ref 6.4–8.4)
PROT UR STRIP-MCNC: NEGATIVE MG/DL
RBC # BLD AUTO: 4.82 MILLION/UL (ref 3.81–5.12)
SODIUM SERPL-SCNC: 137 MMOL/L (ref 135–147)
SP GR UR STRIP.AUTO: 1.01 (ref 1–1.03)
TRIGL SERPL-MCNC: 122 MG/DL
TSH SERPL DL<=0.05 MIU/L-ACNC: 1.75 UIU/ML (ref 0.45–4.5)
UROBILINOGEN UR STRIP-ACNC: <2 MG/DL
WBC # BLD AUTO: 5.07 THOUSAND/UL (ref 4.31–10.16)

## 2023-11-11 PROCEDURE — 86618 LYME DISEASE ANTIBODY: CPT

## 2023-11-11 PROCEDURE — 87086 URINE CULTURE/COLONY COUNT: CPT

## 2023-11-11 PROCEDURE — 86644 CMV ANTIBODY: CPT

## 2023-11-11 PROCEDURE — 87040 BLOOD CULTURE FOR BACTERIA: CPT

## 2023-11-11 PROCEDURE — 86665 EPSTEIN-BARR CAPSID VCA: CPT

## 2023-11-11 PROCEDURE — 86645 CMV ANTIBODY IGM: CPT

## 2023-11-11 PROCEDURE — 84443 ASSAY THYROID STIM HORMONE: CPT

## 2023-11-11 PROCEDURE — 85025 COMPLETE CBC W/AUTO DIFF WBC: CPT

## 2023-11-11 PROCEDURE — 36415 COLL VENOUS BLD VENIPUNCTURE: CPT

## 2023-11-11 PROCEDURE — 81003 URINALYSIS AUTO W/O SCOPE: CPT | Performed by: FAMILY MEDICINE

## 2023-11-11 PROCEDURE — 86663 EPSTEIN-BARR ANTIBODY: CPT

## 2023-11-11 PROCEDURE — 80053 COMPREHEN METABOLIC PANEL: CPT

## 2023-11-11 PROCEDURE — 80061 LIPID PANEL: CPT

## 2023-11-11 PROCEDURE — 71046 X-RAY EXAM CHEST 2 VIEWS: CPT

## 2023-11-11 PROCEDURE — 86664 EPSTEIN-BARR NUCLEAR ANTIGEN: CPT

## 2023-11-12 LAB — B BURGDOR IGG+IGM SER QL IA: NEGATIVE

## 2023-11-13 LAB
BACTERIA UR CULT: NORMAL
CMV IGG SERPL IA-ACNC: <0.6 U/ML (ref 0–0.59)
CMV IGM SERPL IA-ACNC: <30 AU/ML (ref 0–29.9)
EBV NA IGG SER IA-ACNC: 156 U/ML (ref 0–17.9)
EBV VCA IGG SER IA-ACNC: 38.7 U/ML (ref 0–17.9)
EBV VCA IGM SER IA-ACNC: <36 U/ML (ref 0–35.9)
INTERPRETATION: ABNORMAL

## 2023-11-15 ENCOUNTER — PATIENT MESSAGE (OUTPATIENT)
Dept: FAMILY MEDICINE CLINIC | Facility: CLINIC | Age: 58
End: 2023-11-15

## 2023-11-15 DIAGNOSIS — R10.9 LEFT FLANK PAIN: Primary | ICD-10-CM

## 2023-11-15 DIAGNOSIS — R50.9 FEVER, UNSPECIFIED FEVER CAUSE: ICD-10-CM

## 2023-11-16 LAB
BACTERIA BLD CULT: NORMAL
BACTERIA BLD CULT: NORMAL

## 2023-11-17 ENCOUNTER — HOSPITAL ENCOUNTER (OUTPATIENT)
Dept: CT IMAGING | Facility: HOSPITAL | Age: 58
End: 2023-11-17
Payer: COMMERCIAL

## 2023-11-17 ENCOUNTER — E-CONSULT (OUTPATIENT)
Dept: OTHER | Facility: HOSPITAL | Age: 58
End: 2023-11-17
Payer: COMMERCIAL

## 2023-11-17 ENCOUNTER — PATIENT MESSAGE (OUTPATIENT)
Dept: FAMILY MEDICINE CLINIC | Facility: CLINIC | Age: 58
End: 2023-11-17

## 2023-11-17 ENCOUNTER — E-CONSULT REQUEST (OUTPATIENT)
Dept: FAMILY MEDICINE CLINIC | Facility: CLINIC | Age: 58
End: 2023-11-17

## 2023-11-17 DIAGNOSIS — R10.9 LEFT FLANK PAIN: Primary | ICD-10-CM

## 2023-11-17 DIAGNOSIS — R10.9 LEFT FLANK PAIN: ICD-10-CM

## 2023-11-17 DIAGNOSIS — R50.9 FEVER, UNSPECIFIED FEVER CAUSE: Primary | ICD-10-CM

## 2023-11-17 PROCEDURE — NA001 NO CHARGE AUDIO ONLY: Performed by: FAMILY MEDICINE

## 2023-11-17 PROCEDURE — G1004 CDSM NDSC: HCPCS

## 2023-11-17 PROCEDURE — 99451 NTRPROF PH1/NTRNET/EHR 5/>: CPT | Performed by: INTERNAL MEDICINE

## 2023-11-17 PROCEDURE — 74176 CT ABD & PELVIS W/O CONTRAST: CPT

## 2023-11-17 NOTE — PROGRESS NOTES
E-Consult  Dung Mayen 59 y.o. female MRN: [id number]  Encounter Date: 11/17/23      Reason for Consult / Principal Problem: Recurrent/intermittent fever with recent URI/bronchitis symptoms    Consulting Provider: Tomeka Mcghee MD    Requesting Provider: Seema Aguilar DO     Briefly the patient is a 25-year-old female with endometriosis, fibroid uterus, migraines, stress incontinence, previous GYN surgeries along with nasal septum surgery noted in the past.  Patient on chart review had been seen by her primary in August for general follow-up visit and it was also noted that she had previously had an episode of kidney stones requiring intervention. Cancer screenings were otherwise up-to-date. Patient was seen by video visit on 10/24 as she was developing symptoms of fever, nasal congestion, ear pain and productive cough. Patient was recommended for 10-day course of Augmentin along with cough suppressants. She continued however to be running low-grade temperatures even after taking antibiotic. COVID and flu testing had been negative. X-ray reportedly showed clear lungs. Patient had in person visit at urgent care on 11/1. Patient was thought on that visit to largely have a viral syndrome and so was prescribed azithromycin and prednisone. She was also recommended for other supportive measures. Patient then called back in with ongoing low-grade fevers on 11/6. She presented to primary care's office on 11/8. Exam at the time was concerning for early otitis media on the left and so she was prescribed a 10-day course of Ceftin. Patient had blood work done which showed previous EBV exposure, negative Lyme testing, negative blood cultures, unremarkable UA and urine cultures. Chest x-ray was without any acute infiltrate. She then called back to her primary care's office today and the fevers seem to have gotten better on antibiotics but still getting low-grade temps.   Reportedly was having some left-sided abdominal/flank pain but otherwise well. She was ultimately ordered for CT imaging and recommended to go to the ER if she further progresses. We are asked to comment on patient's work-up and provide further recommendations if any. ASSESSMENT/RECOMMENDATIONS:  1. Recurrent low-grade fevers. Based on review of patient's chart I suspect that she may have developed a viral illness initially which could have progressed to a bacterial infection. She has received multiple courses of antibiotics at this point. What is new is the reports of flank discomfort and left-sided abdominal discomfort. She also carries a history of kidney stones and gross hematuria. She had been seen by urology in the past.  Notes mention possible passage of stones with the patient recalled. Recent UA unremarkable. Would however question though if there is an occult process in the abdomen now contributing to her continued low-grade temps and sweats. Suspicion is low for mono-like illness or other progressing upper respiratory viral illness.    -Complete current antibiotic course  -No additional antibiotics recommended  -Agree with obtaining CT imaging of the abdomen  -If pain/fever progresses, agree with need for evaluation in the ER  -No need for isolation based on current EBV testing  -Could consider repeat COVID PCR, to rule out exposure/infection after testing on 10/25    Please call if further questions    Total time spent >5 min, >50% time spent reviewing/analyzing record, written report will be generated in the EMR. Jinny Foote

## 2023-11-17 NOTE — PATIENT COMMUNICATION
Called patient for update on symptoms. Fevers seem better on abx but still getting temps of 100-100.4 at home and breaking out in sweats. She has noticed some pain on left side of abdomen/flank pain but otherwise feels okay. Will check CT. Does feel some fatigue. No headaches or neck pain. Discussed with patient ER with any temps >100.5 or if any worsening of symptoms over weekend. Also, e-consult placed for ID for further recommendations.

## 2023-11-20 ENCOUNTER — APPOINTMENT (EMERGENCY)
Dept: CT IMAGING | Facility: HOSPITAL | Age: 58
End: 2023-11-20
Payer: COMMERCIAL

## 2023-11-20 ENCOUNTER — HOSPITAL ENCOUNTER (OUTPATIENT)
Facility: HOSPITAL | Age: 58
Setting detail: OBSERVATION
Discharge: HOME | End: 2023-11-22
Attending: EMERGENCY MEDICINE | Admitting: INTERNAL MEDICINE
Payer: COMMERCIAL

## 2023-11-20 ENCOUNTER — TELEPHONE (OUTPATIENT)
Dept: FAMILY MEDICINE CLINIC | Facility: CLINIC | Age: 58
End: 2023-11-20

## 2023-11-20 DIAGNOSIS — R50.9 FEVER: Primary | ICD-10-CM

## 2023-11-20 LAB
ALBUMIN SERPL BCP-MCNC: 4.4 G/DL (ref 3.5–5)
ALP SERPL-CCNC: 53 U/L (ref 34–104)
ALT SERPL W P-5'-P-CCNC: 16 U/L (ref 7–52)
ANION GAP SERPL CALCULATED.3IONS-SCNC: 6 MMOL/L
AST SERPL W P-5'-P-CCNC: 16 U/L (ref 13–39)
BASOPHILS # BLD AUTO: 0.04 THOUSANDS/ÂΜL (ref 0–0.1)
BASOPHILS NFR BLD AUTO: 1 % (ref 0–1)
BILIRUB SERPL-MCNC: 0.81 MG/DL (ref 0.2–1)
BILIRUB UR QL STRIP: NEGATIVE
BUN SERPL-MCNC: 18 MG/DL (ref 5–25)
CALCIUM SERPL-MCNC: 9.4 MG/DL (ref 8.4–10.2)
CHLORIDE SERPL-SCNC: 104 MMOL/L (ref 96–108)
CLARITY UR: CLEAR
CO2 SERPL-SCNC: 28 MMOL/L (ref 21–32)
COLOR UR: YELLOW
CREAT SERPL-MCNC: 0.64 MG/DL (ref 0.6–1.3)
EOSINOPHIL # BLD AUTO: 0.16 THOUSAND/ÂΜL (ref 0–0.61)
EOSINOPHIL NFR BLD AUTO: 3 % (ref 0–6)
ERYTHROCYTE [DISTWIDTH] IN BLOOD BY AUTOMATED COUNT: 13.8 % (ref 11.6–15.1)
FLUAV RNA RESP QL NAA+PROBE: NEGATIVE
FLUBV RNA RESP QL NAA+PROBE: NEGATIVE
GFR SERPL CREATININE-BSD FRML MDRD: 97 ML/MIN/1.73SQ M
GLUCOSE SERPL-MCNC: 88 MG/DL (ref 65–140)
GLUCOSE UR STRIP-MCNC: NEGATIVE MG/DL
HCT VFR BLD AUTO: 40.4 % (ref 34.8–46.1)
HGB BLD-MCNC: 13.3 G/DL (ref 11.5–15.4)
HGB UR QL STRIP.AUTO: NEGATIVE
IMM GRANULOCYTES # BLD AUTO: 0.01 THOUSAND/UL (ref 0–0.2)
IMM GRANULOCYTES NFR BLD AUTO: 0 % (ref 0–2)
KETONES UR STRIP-MCNC: NEGATIVE MG/DL
LEUKOCYTE ESTERASE UR QL STRIP: NEGATIVE
LYMPHOCYTES # BLD AUTO: 2.11 THOUSANDS/ÂΜL (ref 0.6–4.47)
LYMPHOCYTES NFR BLD AUTO: 34 % (ref 14–44)
MCH RBC QN AUTO: 29.8 PG (ref 26.8–34.3)
MCHC RBC AUTO-ENTMCNC: 32.9 G/DL (ref 31.4–37.4)
MCV RBC AUTO: 91 FL (ref 82–98)
MONOCYTES # BLD AUTO: 0.53 THOUSAND/ÂΜL (ref 0.17–1.22)
MONOCYTES NFR BLD AUTO: 9 % (ref 4–12)
NEUTROPHILS # BLD AUTO: 3.33 THOUSANDS/ÂΜL (ref 1.85–7.62)
NEUTS SEG NFR BLD AUTO: 53 % (ref 43–75)
NITRITE UR QL STRIP: NEGATIVE
NRBC BLD AUTO-RTO: 0 /100 WBCS
PH UR STRIP.AUTO: 6.5 [PH] (ref 4.5–8)
PLATELET # BLD AUTO: 258 THOUSANDS/UL (ref 149–390)
PMV BLD AUTO: 11.5 FL (ref 8.9–12.7)
POTASSIUM SERPL-SCNC: 3.9 MMOL/L (ref 3.5–5.3)
PROT SERPL-MCNC: 7.3 G/DL (ref 6.4–8.4)
PROT UR STRIP-MCNC: NEGATIVE MG/DL
RBC # BLD AUTO: 4.46 MILLION/UL (ref 3.81–5.12)
RSV RNA RESP QL NAA+PROBE: NEGATIVE
SARS-COV-2 RNA RESP QL NAA+PROBE: NEGATIVE
SODIUM SERPL-SCNC: 138 MMOL/L (ref 135–147)
SP GR UR STRIP.AUTO: 1.01 (ref 1–1.03)
UROBILINOGEN UR QL STRIP.AUTO: 0.2 E.U./DL
WBC # BLD AUTO: 6.18 THOUSAND/UL (ref 4.31–10.16)

## 2023-11-20 PROCEDURE — 71250 CT THORAX DX C-: CPT

## 2023-11-20 PROCEDURE — 99285 EMERGENCY DEPT VISIT HI MDM: CPT | Performed by: EMERGENCY MEDICINE

## 2023-11-20 PROCEDURE — 36415 COLL VENOUS BLD VENIPUNCTURE: CPT | Performed by: EMERGENCY MEDICINE

## 2023-11-20 PROCEDURE — 85025 COMPLETE CBC W/AUTO DIFF WBC: CPT | Performed by: EMERGENCY MEDICINE

## 2023-11-20 PROCEDURE — 99223 1ST HOSP IP/OBS HIGH 75: CPT | Performed by: INTERNAL MEDICINE

## 2023-11-20 PROCEDURE — 87040 BLOOD CULTURE FOR BACTERIA: CPT | Performed by: EMERGENCY MEDICINE

## 2023-11-20 PROCEDURE — 80053 COMPREHEN METABOLIC PANEL: CPT | Performed by: EMERGENCY MEDICINE

## 2023-11-20 PROCEDURE — 0241U HB NFCT DS VIR RESP RNA 4 TRGT: CPT | Performed by: INTERNAL MEDICINE

## 2023-11-20 PROCEDURE — 99284 EMERGENCY DEPT VISIT MOD MDM: CPT

## 2023-11-20 PROCEDURE — 81003 URINALYSIS AUTO W/O SCOPE: CPT

## 2023-11-20 PROCEDURE — G1004 CDSM NDSC: HCPCS

## 2023-11-20 RX ORDER — SUMATRIPTAN 50 MG/1
100 TABLET, FILM COATED ORAL EVERY 2 HOUR PRN
Status: DISCONTINUED | OUTPATIENT
Start: 2023-11-20 | End: 2023-11-22 | Stop reason: HOSPADM

## 2023-11-20 RX ORDER — ACETAMINOPHEN 325 MG/1
650 TABLET ORAL EVERY 6 HOURS PRN
Status: DISCONTINUED | OUTPATIENT
Start: 2023-11-20 | End: 2023-11-22 | Stop reason: HOSPADM

## 2023-11-20 RX ORDER — ENOXAPARIN SODIUM 100 MG/ML
40 INJECTION SUBCUTANEOUS DAILY
Status: DISCONTINUED | OUTPATIENT
Start: 2023-11-21 | End: 2023-11-22 | Stop reason: HOSPADM

## 2023-11-20 RX ORDER — LORAZEPAM 0.5 MG/1
0.5 TABLET ORAL EVERY 8 HOURS PRN
Status: DISCONTINUED | OUTPATIENT
Start: 2023-11-20 | End: 2023-11-22 | Stop reason: HOSPADM

## 2023-11-20 RX ORDER — ONDANSETRON 2 MG/ML
4 INJECTION INTRAMUSCULAR; INTRAVENOUS EVERY 6 HOURS PRN
Status: DISCONTINUED | OUTPATIENT
Start: 2023-11-20 | End: 2023-11-22 | Stop reason: HOSPADM

## 2023-11-20 RX ADMIN — SERTRALINE HYDROCHLORIDE 50 MG: 50 TABLET ORAL at 21:22
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
## 2023-11-20 NOTE — TELEPHONE ENCOUNTER
Patient left message for you:     Hi, this is Paul Brand. The last name is spelled G as in girl EN as in [facility]. It's Monday, November 20th about 11:00 AM. This is an urgent message for Doctor Yesenia Villa. I've been seeing her for the past few weeks regarding this fever that I can't get rid of. Doctor Yesenia Villa advised that if my fever rises above 100.4 that I should go to the. It is now 100.6. I woke up this morning, I didn't have a fever. Now all of a sudden it spiked up. I'm finished with [facility], so I will go to the emergency room. I just need to know if I should go to [facility] or if I should go to [facility] since that's closer. If someone could please call me back. My phone number is [phone]. Thank you. Advised patient that it was her choice as to where to go. She stated she would be going to [facility] because its closer.

## 2023-11-20 NOTE — ED NOTES
Cleared per anesthesia for transfer to floor when criteria met   Pt states fever was 100. 6. not 106 as written in triage. States began at 10:30 this AM but has subsided.       Charlette Larose RN  11/20/23 3039
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
## 2023-11-20 NOTE — ASSESSMENT & PLAN NOTE
Ongoing low grade temps  Originally dx with URI on 10/24 in which she had fever of 102   She was treated with three different antibiotics since then due to ongoing symptoms and fevers. The antibiotics consist of augmentin, azthromycin, and ceftin. She completed ceftin on Saturday  Despite this she continues to have fevers of 100.6 and malaise  She reports she is up to date on cancer screening  No arthralgias but she malaise.    She has chronic abd pain but no rashes  She has had an extensive work up outpt that was negative including cts and covid/flu/rsv  Will repeat covid/flu/rsv  Could be viral in which pt had atelectasis that was causing ongoing low grade temp  Will add incentive spirometry  Consult infectious disease  Repeat blood cultures are pending  Hold on additional antibiotics

## 2023-11-21 PROBLEM — F41.9 ANXIETY AND DEPRESSION: Status: ACTIVE | Noted: 2017-10-25

## 2023-11-21 LAB
ALBUMIN SERPL BCP-MCNC: 3.9 G/DL (ref 3.5–5)
ALP SERPL-CCNC: 50 U/L (ref 34–104)
ALT SERPL W P-5'-P-CCNC: 16 U/L (ref 7–52)
ANION GAP SERPL CALCULATED.3IONS-SCNC: 6 MMOL/L
AST SERPL W P-5'-P-CCNC: 17 U/L (ref 13–39)
BILIRUB SERPL-MCNC: 0.83 MG/DL (ref 0.2–1)
BUN SERPL-MCNC: 16 MG/DL (ref 5–25)
CALCIUM SERPL-MCNC: 9.1 MG/DL (ref 8.4–10.2)
CHLORIDE SERPL-SCNC: 105 MMOL/L (ref 96–108)
CO2 SERPL-SCNC: 27 MMOL/L (ref 21–32)
CREAT SERPL-MCNC: 0.63 MG/DL (ref 0.6–1.3)
ERYTHROCYTE [DISTWIDTH] IN BLOOD BY AUTOMATED COUNT: 13.8 % (ref 11.6–15.1)
GFR SERPL CREATININE-BSD FRML MDRD: 97 ML/MIN/1.73SQ M
GLUCOSE P FAST SERPL-MCNC: 89 MG/DL (ref 65–99)
GLUCOSE SERPL-MCNC: 89 MG/DL (ref 65–140)
HCT VFR BLD AUTO: 37.2 % (ref 34.8–46.1)
HGB BLD-MCNC: 12.7 G/DL (ref 11.5–15.4)
MCH RBC QN AUTO: 30.2 PG (ref 26.8–34.3)
MCHC RBC AUTO-ENTMCNC: 34.1 G/DL (ref 31.4–37.4)
MCV RBC AUTO: 89 FL (ref 82–98)
PLATELET # BLD AUTO: 240 THOUSANDS/UL (ref 149–390)
PMV BLD AUTO: 12 FL (ref 8.9–12.7)
POTASSIUM SERPL-SCNC: 3.6 MMOL/L (ref 3.5–5.3)
PROT SERPL-MCNC: 6.6 G/DL (ref 6.4–8.4)
RBC # BLD AUTO: 4.2 MILLION/UL (ref 3.81–5.12)
SODIUM SERPL-SCNC: 138 MMOL/L (ref 135–147)
WBC # BLD AUTO: 5.49 THOUSAND/UL (ref 4.31–10.16)

## 2023-11-21 PROCEDURE — 99232 SBSQ HOSP IP/OBS MODERATE 35: CPT | Performed by: PHYSICIAN ASSISTANT

## 2023-11-21 PROCEDURE — 80053 COMPREHEN METABOLIC PANEL: CPT | Performed by: INTERNAL MEDICINE

## 2023-11-21 PROCEDURE — 99204 OFFICE O/P NEW MOD 45 MIN: CPT | Performed by: INTERNAL MEDICINE

## 2023-11-21 PROCEDURE — 85027 COMPLETE CBC AUTOMATED: CPT | Performed by: INTERNAL MEDICINE

## 2023-11-21 RX ADMIN — SERTRALINE HYDROCHLORIDE 50 MG: 50 TABLET ORAL at 21:59

## 2023-11-21 RX ADMIN — ENOXAPARIN SODIUM 40 MG: 40 INJECTION SUBCUTANEOUS at 08:35

## 2023-11-21 NOTE — PROGRESS NOTES
[facility]  Progress Note  Name: Jose Antonio Gutiérrez  MRN: [id number]  Unit/Bed#: [facility] 2 -01 I Date of Admission: 11/20/2023   Date of Service: 11/21/2023 I Hospital Day: 0    Assessment/Plan   * Febrile  Assessment & Plan  Ongoing low grade temps since October 2023  Originally dx with URI on 10/24 in which she had fever of 102   She was treated with three different antibiotics since then due to ongoing symptoms and fevers. The antibiotics consist of augmentin, azthromycin, and ceftin. She completed ceftin on 11/18  Despite this she continues to have fevers of 100.6 and malaise  She reports she is up to date on cancer screening  No arthralgias but she has malaise/fatigue. She has chronic abd pain but no rashes  She has had an extensive work up outpt that was negative including CTs and covid/flu/rsv  Repeat covid/flu/rsv negative  Could be viral in which pt had atelectasis that was causing ongoing low grade temp? Continue incentive spirometry  Blood cultures pending  Continue to monitor off antibiotics - has not had a fever here  Did admit to passing small doreen of red/brown/gray in her urine yesterday   UA from yesterday was negative  CT chest from 11/20 without acute abnormality in chest  Reviewed CT abdomen from 11/17 - no nephroureterolithiasis, no acute abnormality  Lyme pending. EBV indicates past infection. CMV negative.  TSH normal. Lipid panel normal  Await Infectious disease recommendations    Anxiety and depression  Assessment & Plan  Takes zoloft 50 mg daily  Mood stable    Migraine headache  Assessment & Plan  Prn imitrex           VTE Pharmacologic Prophylaxis:   Pharmacologic: Enoxaparin (Lovenox)  Mechanical VTE Prophylaxis in Place: Yes    Discharge Plan: with need for continued inpatient stay for ongoing workup and blood cultures    Discussions with Specialists or Other Care Team Provider: nursing    Education and Discussions with Family / Patient: patient    Time Spent for Care: This time was spent on one or more of the following: performing physical exam; counseling and coordination of care; obtaining or reviewing history; documenting in the medical record; reviewing/ordering tests, medications, or procedures; communicating with other healthcare professionals and discussing with patient's family/caregivers. Current Length of Stay: 0 day(s)  Current Patient Status: Observation   Code Status: Level 1 - Full Code    Subjective:   Patient resting in bed. Complains of feeling fatigued and malaise. Admits to passing a small doreen in urine yesterday. Has some aching on left flank. Awaiting further ID recommendations. Objective:     Vitals:   Temp (24hrs), Av °F (36.7 °C), Min:97.7 °F (36.5 °C), Max:98.4 °F (36.9 °C)    Temp:  [97.7 °F (36.5 °C)-98.4 °F (36.9 °C)] 97.8 °F (36.6 °C)  HR:  [53-87] 70  Resp:  [16-22] 16  BP: (126-185)/() 126/78  SpO2:  [96 %-99 %] 96 %  Body mass index is 35.81 kg/m². Input and Output Summary (last 24 hours):     No intake or output data in the 24 hours ending 23 1036    Physical Exam:     Physical Exam  Vitals and nursing note reviewed. Constitutional:       General: She is not in acute distress. Appearance: Normal appearance. She is normal weight. She is not ill-appearing, toxic-appearing or diaphoretic. HENT:      Head: Normocephalic and atraumatic. Eyes:      General: No scleral icterus. Cardiovascular:      Rate and Rhythm: Normal rate and regular rhythm. Pulmonary:      Effort: Pulmonary effort is normal. No respiratory distress. Breath sounds: Normal breath sounds. No stridor. No wheezing or rhonchi. Abdominal:      General: Bowel sounds are normal. There is no distension. Palpations: Abdomen is soft. There is no mass. Tenderness: There is no abdominal tenderness. Hernia: No hernia is present. Musculoskeletal:         General: No swelling.       Cervical back: Neck supple. Skin:     Coloration: Skin is not pale. Findings: No erythema or rash. Neurological:      Mental Status: She is alert and oriented to person, place, and time. Mental status is at baseline. Psychiatric:         Mood and Affect: Mood normal.         Behavior: Behavior normal.         Additional Data:     Labs:    Results from last 7 days   Lab Units 11/21/23  0452 11/20/23  1335   WBC Thousand/uL 5.49 6.18   HEMOGLOBIN g/dL 12.7 13.3   HEMATOCRIT % 37.2 40.4   PLATELETS Thousands/uL 240 258   NEUTROS PCT %  --  53   LYMPHS PCT %  --  34   MONOS PCT %  --  9   EOS PCT %  --  3     Results from last 7 days   Lab Units 11/21/23  0452   POTASSIUM mmol/L 3.6   CHLORIDE mmol/L 105   CO2 mmol/L 27   BUN mg/dL 16   CREATININE mg/dL 0.63   CALCIUM mg/dL 9.1   ALK PHOS U/L 50   ALT U/L 16   AST U/L 17           * I Have Reviewed All Lab Data Listed Above. * Additional Pertinent Lab Tests Reviewed: [facility] Admission Reviewed    Imaging:    Imaging Reports Reviewed Today Include: ct chest, ct abdomen, UA  Imaging Personally Reviewed by Myself Includes:      Recent Cultures (last 7 days):     Results from last 7 days   Lab Units 11/20/23  1541 11/20/23  1535   BLOOD CULTURE  Received in Microbiology Lab. Culture in Progress. Received in Microbiology Lab. Culture in Progress.        Lines/Drains:  Invasive Devices       Peripheral Intravenous Line  Duration             Peripheral IV 11/20/23 Left Antecubital <1 day                    Last 24 Hours Medication List:   Current Facility-Administered Medications   Medication Dose Route Frequency Provider Last Rate    acetaminophen  650 mg Oral Q6H PRN Viry Ambron, DO      enoxaparin  40 mg Subcutaneous Daily Viry Ambron, DO      LORazepam  0.5 mg Oral Q8H PRN Viry Ambron, DO      ondansetron  4 mg Intravenous Q6H PRN Viry Ambron, DO      sertraline  50 mg Oral Daily Viry Ambron, DO      SUMAtriptan  100 mg Oral Q2H PRN Viry DO Christopher          Today, Patient Was Seen By: Nasir Ashley PA-C    ** Please Note: This note has been constructed using a voice recognition system.  **

## 2023-11-21 NOTE — ASSESSMENT & PLAN NOTE
Ongoing low grade temps since October 2023  Originally dx with URI on 10/24 in which she had fever of 102   She was treated with three different antibiotics since then due to ongoing symptoms and fevers. The antibiotics consist of augmentin, azthromycin, and ceftin. She completed ceftin on 11/18  Despite this she continues to have fevers of 100.6 and malaise  She reports she is up to date on cancer screening  No arthralgias but she has malaise/fatigue. She has chronic abd pain but no rashes  She has had an extensive work up outpt that was negative including CTs and covid/flu/rsv  Repeat covid/flu/rsv negative  Could be viral in which pt had atelectasis that was causing ongoing low grade temp? Continue incentive spirometry  Blood cultures pending  Continue to monitor off antibiotics - has not had a fever here  Did admit to passing small doreen of red/brown/gray in her urine yesterday   UA from yesterday was negative  CT chest from 11/20 without acute abnormality in chest  Reviewed CT abdomen from 11/17 - no nephroureterolithiasis, no acute abnormality  Lyme pending. EBV indicates past infection. CMV negative.  TSH normal. Lipid panel normal  Await Infectious disease recommendations

## 2023-11-21 NOTE — PLAN OF CARE
Problem: PAIN - ADULT  Goal: Verbalizes/displays adequate comfort level or baseline comfort level  Description: Interventions:  - Encourage patient to monitor pain and request assistance  - Assess pain using appropriate pain scale  - Administer analgesics based on type and severity of pain and evaluate response  - Implement non-pharmacological measures as appropriate and evaluate response  - Consider cultural and social influences on pain and pain management  - Notify physician/advanced practitioner if interventions unsuccessful or patient reports new pain  Outcome: Progressing     Problem: INFECTION - ADULT  Goal: Absence or prevention of progression during hospitalization  Description: INTERVENTIONS:  - Assess and monitor for signs and symptoms of infection  - Monitor lab/diagnostic results  - Monitor all insertion sites, i.e. indwelling lines, tubes, and drains  - Monitor endotracheal if appropriate and nasal secretions for changes in amount and color  - Jefferson appropriate cooling/warming therapies per order  - Administer medications as ordered  - Instruct and encourage patient and family to use good hand hygiene technique  - Identify and instruct in appropriate isolation precautions for identified infection/condition  Outcome: Progressing     Problem: SAFETY ADULT  Goal: Patient will remain free of falls  Description: INTERVENTIONS:  - Educate patient/family on patient safety including physical limitations  - Instruct patient to call for assistance with activity   - Consult OT/PT to assist with strengthening/mobility   - Keep Call bell within reach  - Keep bed low and locked with side rails adjusted as appropriate  - Keep care items and personal belongings within reach  - Initiate and maintain comfort rounds  - Make Fall Risk Sign visible to staff  - Offer Toileting every 2 Hours, in advance of need  - Initiate/Maintain alarm  - Obtain necessary fall risk management equipment:   - Apply yellow socks and bracelet for high fall risk patients  - Consider moving patient to room near nurses station  Outcome: Progressing     Problem: Knowledge Deficit  Goal: Patient/family/caregiver demonstrates understanding of disease process, treatment plan, medications, and discharge instructions  Description: Complete learning assessment and assess knowledge base.   Interventions:  - Provide teaching at level of understanding  - Provide teaching via preferred learning methods  Outcome: Progressing

## 2023-11-21 NOTE — PLAN OF CARE

## 2023-11-22 VITALS
DIASTOLIC BLOOD PRESSURE: 77 MMHG | RESPIRATION RATE: 16 BRPM | SYSTOLIC BLOOD PRESSURE: 118 MMHG | HEIGHT: 62 IN | HEART RATE: 75 BPM | BODY MASS INDEX: 36.03 KG/M2 | OXYGEN SATURATION: 97 % | TEMPERATURE: 98.5 F | WEIGHT: 195.77 LBS

## 2023-11-22 LAB
ALBUMIN SERPL BCP-MCNC: 3.9 G/DL (ref 3.5–5)
ALP SERPL-CCNC: 44 U/L (ref 34–104)
ALT SERPL W P-5'-P-CCNC: 14 U/L (ref 7–52)
ANION GAP SERPL CALCULATED.3IONS-SCNC: 6 MMOL/L
AST SERPL W P-5'-P-CCNC: 14 U/L (ref 13–39)
BASOPHILS # BLD AUTO: 0.03 THOUSANDS/ÂΜL (ref 0–0.1)
BASOPHILS NFR BLD AUTO: 1 % (ref 0–1)
BILIRUB SERPL-MCNC: 0.98 MG/DL (ref 0.2–1)
BUN SERPL-MCNC: 15 MG/DL (ref 5–25)
CALCIUM SERPL-MCNC: 8.8 MG/DL (ref 8.4–10.2)
CHLORIDE SERPL-SCNC: 105 MMOL/L (ref 96–108)
CK SERPL-CCNC: 48 U/L (ref 26–192)
CO2 SERPL-SCNC: 26 MMOL/L (ref 21–32)
CREAT SERPL-MCNC: 0.63 MG/DL (ref 0.6–1.3)
CRP SERPL QL: 1.3 MG/L
EOSINOPHIL # BLD AUTO: 0.15 THOUSAND/ÂΜL (ref 0–0.61)
EOSINOPHIL NFR BLD AUTO: 3 % (ref 0–6)
ERYTHROCYTE [DISTWIDTH] IN BLOOD BY AUTOMATED COUNT: 13.6 % (ref 11.6–15.1)
ERYTHROCYTE [SEDIMENTATION RATE] IN BLOOD: 10 MM/HOUR (ref 0–29)
GFR SERPL CREATININE-BSD FRML MDRD: 97 ML/MIN/1.73SQ M
GLUCOSE P FAST SERPL-MCNC: 95 MG/DL (ref 65–99)
GLUCOSE SERPL-MCNC: 95 MG/DL (ref 65–140)
HCT VFR BLD AUTO: 37.2 % (ref 34.8–46.1)
HGB BLD-MCNC: 12.4 G/DL (ref 11.5–15.4)
HIV 1+2 AB+HIV1 P24 AG SERPL QL IA: NORMAL
HIV1 P24 AG SER QL: NORMAL
IMM GRANULOCYTES # BLD AUTO: 0.01 THOUSAND/UL (ref 0–0.2)
IMM GRANULOCYTES NFR BLD AUTO: 0 % (ref 0–2)
LDH SERPL-CCNC: 135 U/L (ref 140–271)
LYMPHOCYTES # BLD AUTO: 1.91 THOUSANDS/ÂΜL (ref 0.6–4.47)
LYMPHOCYTES NFR BLD AUTO: 41 % (ref 14–44)
MCH RBC QN AUTO: 29.7 PG (ref 26.8–34.3)
MCHC RBC AUTO-ENTMCNC: 33.3 G/DL (ref 31.4–37.4)
MCV RBC AUTO: 89 FL (ref 82–98)
MONOCYTES # BLD AUTO: 0.42 THOUSAND/ÂΜL (ref 0.17–1.22)
MONOCYTES NFR BLD AUTO: 9 % (ref 4–12)
NEUTROPHILS # BLD AUTO: 2.17 THOUSANDS/ÂΜL (ref 1.85–7.62)
NEUTS SEG NFR BLD AUTO: 46 % (ref 43–75)
NRBC BLD AUTO-RTO: 0 /100 WBCS
PLATELET # BLD AUTO: 224 THOUSANDS/UL (ref 149–390)
PMV BLD AUTO: 11.8 FL (ref 8.9–12.7)
POTASSIUM SERPL-SCNC: 3.6 MMOL/L (ref 3.5–5.3)
PROT SERPL-MCNC: 6.5 G/DL (ref 6.4–8.4)
RBC # BLD AUTO: 4.18 MILLION/UL (ref 3.81–5.12)
SODIUM SERPL-SCNC: 137 MMOL/L (ref 135–147)
WBC # BLD AUTO: 4.69 THOUSAND/UL (ref 4.31–10.16)

## 2023-11-22 PROCEDURE — 87806 HIV AG W/HIV1&2 ANTB W/OPTIC: CPT | Performed by: INTERNAL MEDICINE

## 2023-11-22 PROCEDURE — 85025 COMPLETE CBC W/AUTO DIFF WBC: CPT | Performed by: INTERNAL MEDICINE

## 2023-11-22 PROCEDURE — 83615 LACTATE (LD) (LDH) ENZYME: CPT | Performed by: INTERNAL MEDICINE

## 2023-11-22 PROCEDURE — 82550 ASSAY OF CK (CPK): CPT | Performed by: INTERNAL MEDICINE

## 2023-11-22 PROCEDURE — 99232 SBSQ HOSP IP/OBS MODERATE 35: CPT | Performed by: INTERNAL MEDICINE

## 2023-11-22 PROCEDURE — 85652 RBC SED RATE AUTOMATED: CPT | Performed by: INTERNAL MEDICINE

## 2023-11-22 PROCEDURE — 86140 C-REACTIVE PROTEIN: CPT | Performed by: INTERNAL MEDICINE

## 2023-11-22 PROCEDURE — 80053 COMPREHEN METABOLIC PANEL: CPT | Performed by: INTERNAL MEDICINE

## 2023-11-22 PROCEDURE — 99239 HOSP IP/OBS DSCHRG MGMT >30: CPT | Performed by: PHYSICIAN ASSISTANT

## 2023-11-22 RX ADMIN — ENOXAPARIN SODIUM 40 MG: 40 INJECTION SUBCUTANEOUS at 08:20

## 2023-11-22 NOTE — ASSESSMENT & PLAN NOTE
Ongoing low grade temps since October 2023 with malaise and fatigue  Originally dx with URI on 10/24/23 with fever 102   She was treated with three different antibiotics since then due to ongoing symptoms and fevers. The antibiotics consisted of augmentin, azthromycin, and ceftin. She completed ceftin on 11/18/23  Despite this she continues to have fevers of 100.6 and malaise and was recommended to come to the hospital  Up to date on cancer screening  No arthralgias or rashes. She has had an extensive work up outpt that was negative including CTs   Covid/flu/rsv negative. Repeat covid/flu/rsv negative  Could be viral in which pt had atelectasis that was causing ongoing low grade temp? Blood cultures negative at 24 hours  Monitored off antibiotics - has not had a fever here  UA was negative  CT chest 11/20 without acute abnormality in chest  Reviewed CT abdomen from 11/17 - no nephroureterolithiasis, no acute abnormality  Lyme negative. EBV indicates past infection. CMV negative.  HIV negative  TSH normal. Lipid panel normal  Seen by ID - infectious workup negative - stable for d.c. Consider workup for for pheochromocytoma if pt has recurrent bouts of hypertension/tachycardia with recurrent fever vs endocrinology/hormonal workup

## 2023-11-22 NOTE — PLAN OF CARE

## 2023-11-22 NOTE — DISCHARGE SUMMARY
[facility]  Discharge- Chika Vermillion 1965, 59 y.o. female MRN: [id number]  Unit/Bed#: [place] [address] [facility] 202-01 Encounter: [id number]  Primary Care Provider: Issa Smith DO   Date and time admitted to hospital: 11/20/2023 12:25 PM    * Low grade fever  Assessment & Plan  Ongoing low grade temps since October 2023 with malaise and fatigue  Originally dx with URI on 10/24/23 with fever 102   She was treated with three different antibiotics since then due to ongoing symptoms and fevers. The antibiotics consisted of augmentin, azthromycin, and ceftin. She completed ceftin on 11/18/23  Despite this she continues to have fevers of 100.6 and malaise and was recommended to come to the hospital  Up to date on cancer screening  No arthralgias or rashes. She has had an extensive work up outpt that was negative including CTs   Covid/flu/rsv negative. Repeat covid/flu/rsv negative  Could be viral in which pt had atelectasis that was causing ongoing low grade temp? Blood cultures negative at 24 hours  Monitored off antibiotics - has not had a fever here  UA was negative  CT chest 11/20 without acute abnormality in chest  Reviewed CT abdomen from 11/17 - no nephroureterolithiasis, no acute abnormality  Lyme negative. EBV indicates past infection. CMV negative. HIV negative  TSH normal. Lipid panel normal  Seen by ID - infectious workup negative - stable for d.c. Consider workup for for pheochromocytoma if pt has recurrent bouts of hypertension/tachycardia with recurrent fever vs endocrinology/hormonal workup    Anxiety and depression  Assessment & Plan  Takes zoloft 50 mg daily  Mood stable    Migraine headache  Assessment & Plan  Stable without headache here  Prn imitrex        Consultations During Hospital Stay:  Infectious Disease    Procedures Performed:   CT chest wo contrast  Result Date: 11/20/2023  Impression: No active pulmonary disease.  Workstation performed: [id number] Significant Findings / Test Results:   Low grade fever    Incidental Findings:   None     Test Results Pending at Discharge (will require follow up): Blood cultures - negative at 24 hrs     Outpatient Followup Requested:  PCP     Complications:  none    Hospital Course:     Ponce Cook is a 59 y.o. female patient who originally presented to the hospital on 11/20/2023 due to ongoing low-grade fevers. Patient underwent extensive infectious workup which ultimately resulted in negative results. Patient had CAT scans of her chest and abdomen as well as COVID/flu/RSV swabs performed. Urine analysis was normal.  She had went evaluation of blood cultures which have remained negative at 24 hours. Her Lyme, CMV, HIV studies were negative. TSH and lipid panel within normal limits. She was evaluated by infectious disease. She was monitored off antibiotics and did not spike a fever here. She was noted to have some elevated blood pressure upon arrival however since then, BP has trended down off of medication. It was recommended if she have persistent hypertension along with tachycardia and fevers to undergo pheochromocytoma workup. Otherwise recommended to have endocrinology/hormonal evaluation. In conclusion, patient is stable for discharge at this time. Condition at Discharge: stable     Discharge Day Visit / Exam:     Subjective:  Resting in bed. Feels good today. Does complain of sweats at certain times but has not had fever or chills here. No other complaints. Yonatan Rojas to go home. Discussed outpt followup. Vitals: Blood Pressure: 118/77 (11/22/23 0712)  Pulse: 75 (11/22/23 0713)  Temperature: 98.5 °F (36.9 °C) (11/22/23 0712)  Temp Source: Oral (11/21/23 0030)  Respirations: 16 (11/22/23 0712)  Height: 5' 2" (157.5 cm) (11/20/23 1220)  Weight - Scale: 88.8 kg (195 lb 12.3 oz) (11/20/23 1220)  SpO2: 97 % (11/22/23 0713)    Exam:   Physical Exam  Vitals and nursing note reviewed.    Constitutional: General: She is not in acute distress. Appearance: She is obese. She is not toxic-appearing. HENT:      Head: Normocephalic and atraumatic. Eyes:      General: No scleral icterus. Cardiovascular:      Rate and Rhythm: Normal rate and regular rhythm. Pulmonary:      Effort: Pulmonary effort is normal. No respiratory distress. Breath sounds: Normal breath sounds. No stridor. No wheezing or rhonchi. Abdominal:      General: Bowel sounds are normal. There is no distension. Palpations: Abdomen is soft. There is no mass. Tenderness: There is no abdominal tenderness. Hernia: No hernia is present. Musculoskeletal:         General: No swelling. Cervical back: Neck supple. Skin:     General: Skin is warm and dry. Neurological:      Mental Status: She is alert and oriented to person, place, and time. Mental status is at baseline. Psychiatric:         Mood and Affect: Mood normal.         Behavior: Behavior normal.       Discharge instructions/Information to patient and family:   See after visit summary for information provided to patient and family. Provisions for Follow-Up Care:  See after visit summary for information related to follow-up care and any pertinent home health orders. Planned Readmission: no     Discharge Statement: This time was spent on the day of discharge. I had direct contact with the patient on the day of discharge. Greater than 50% of the total time was spent examining patient, answering all patient questions, arranging and discussing plan of care with patient as well as directly providing post-discharge instructions. Additional time then spent on discharge activities. Discharge Medications:  See after visit summary for reconciled discharge medications provided to patient and family.       ** Please Note: This note has been constructed using a voice recognition system **

## 2023-11-22 NOTE — NURSING NOTE
Reviewed discharge paperwork with patient. All questions and concerns answered at this time. IV and masimo removed. Patient and belongings accompanied with spouse and PCA to lobby for discharge home.

## 2023-11-22 NOTE — PROGRESS NOTES
Progress Note - Infectious Disease   Daniel Ordonez 59 y.o. female MRN: [id number]  Unit/Bed#: [place] [address] [facility] 202-01 Encounter: [id number]    Impression/Plan:  1. Fever. No clear infectious etiology. She has had extensive outpatient work up including negative COVID-19/Flu, negative UA, negative blood cultures, normal TSH, negative CMV, EBV with evidence of past infection, negative Lyme Ab, normal CBCD, normal CMP with slight increase in Tbili, CXR with no acute infectious cardiopulmonary findings, and CT A/P wo contrast which showed no acute intraabdominal or intrapelvic findings. Patient is excellent historian and has no recent international travel, dental work, exposure to wild animals, diet change, or change in personal or home care products (see ROS in consult for full details). Now inpatient, the patient has remained afebrile with normal WBC count. Admission blood cultures are negative >24 hours. New CT chest with no acute infectious cardiopulmonary findings. Sed rate and CRP normal. HIV screen was negative. New urine with no acute findings. At this point I would not recommend antibiotic treatment. She did become quickly flushed and warm during my exam, consider possibility of hormones playing a role in her fever and fatigue. She may benefit from work up by endocrinology. Also consider work up for pheochromocyoma if patient's fevers are accompanied by tachycardia or HTN. -monitor patient off antibiotics  -monitor CBCD and BMP  -follow up blood cultures  -monitor vitals  -consider endocrinology evaluation for hormonal source of fever  -consider work up for pheochromocytoma if patient has tachycardia and HTN with fever  -supportive care    Patient is stable for discharge from ID standpoint. Above plan was discussed in detail with patient at the bedside. Above plan was discussed in detail with SLIM. Antibiotics:  No current antibiotic use    Subjective:  Patient reports she's feeling well.  Has had some additional "hot flashes" since she met with me yesterday but tells me her temperatures have not been elevated. She has no nausea, vomiting, abdominal pain, diarrhea, dysuria, cough, shortness of breath, or chest pain. No new symptoms. Objective:  Vitals:  Temp:  [97.8 °F (36.6 °C)-98 °F (36.7 °C)] 97.8 °F (36.6 °C)  HR:  [62-80] 73  Resp:  [16-18] 17  BP: (117-130)/(76-82) 117/78  SpO2:  [95 %-99 %] 97 %  Temp (24hrs), Av.9 °F (36.6 °C), Min:97.8 °F (36.6 °C), Max:98 °F (36.7 °C)  Current: Temperature: 97.8 °F (36.6 °C)    Physical Exam:   General Appearance:  Alert, interactive, nontoxic, no acute distress. She appears comfortable sitting on the edge of her bed. Throat: Oropharynx moist without lesions. Lungs:   Clear to auscultation bilaterally; no wheezes, rhonchi or rales; respirations unlabored on room air   Heart:  RRR; no murmur, rub or gallop. Abdomen:   Soft, non-tender, non-distended, positive bowel sounds. Extremities: No clubbing or cyanosis, no edema. Skin: No new rashes, lesions, or draining wounds noted on exposed skin. Labs, Imaging, & Other studies:   All pertinent labs and imaging studies were personally reviewed  Results from last 7 days   Lab Units 23  0516 23  1335   WBC Thousand/uL 4.69 5.49 6.18   HEMOGLOBIN g/dL 12.4 12.7 13.3   PLATELETS Thousands/uL 224 240 258     Results from last 7 days   Lab Units 23  0516 23  1335   POTASSIUM mmol/L 3.6 3.6 3.9   CHLORIDE mmol/L 105 105 104   CO2 mmol/L 26 27 28   BUN mg/dL 15 16 18   CREATININE mg/dL 0.63 0.63 0.64   EGFR ml/min/1.73sq m 97 97 97   CALCIUM mg/dL 8.8 9.1 9.4   AST U/L 14 17 16   ALT U/L 14 16 16   ALK PHOS U/L 44 50 53     Results from last 7 days   Lab Units 23  1541 23  1535   BLOOD CULTURE  No Growth at 24 hrs. No Growth at 24 hrs.

## 2023-11-22 NOTE — PLAN OF CARE

## 2023-11-25 LAB
BACTERIA BLD CULT: NORMAL
BACTERIA BLD CULT: NORMAL

## 2023-11-29 ENCOUNTER — ANNUAL EXAM (OUTPATIENT)
Dept: GYNECOLOGY | Facility: CLINIC | Age: 58
End: 2023-11-29
Payer: COMMERCIAL

## 2023-11-29 VITALS
DIASTOLIC BLOOD PRESSURE: 80 MMHG | HEIGHT: 62 IN | SYSTOLIC BLOOD PRESSURE: 142 MMHG | WEIGHT: 197 LBS | BODY MASS INDEX: 36.25 KG/M2

## 2023-11-29 DIAGNOSIS — N95.2 ATROPHIC VAGINITIS: ICD-10-CM

## 2023-11-29 DIAGNOSIS — R50.9 FEVER, UNKNOWN ORIGIN: Primary | ICD-10-CM

## 2023-11-29 DIAGNOSIS — Z01.419 ENCOUNTER FOR GYNECOLOGICAL EXAMINATION WITHOUT ABNORMAL FINDING: ICD-10-CM

## 2023-11-29 PROCEDURE — S0612 ANNUAL GYNECOLOGICAL EXAMINA: HCPCS | Performed by: OBSTETRICS & GYNECOLOGY

## 2023-11-29 NOTE — PROGRESS NOTES
Assessment/Plan:         Diagnoses and all orders for this visit:    Fever, unknown origin    -     C-reactive protein; Future  -     Sedimentation rate, automated  -     Cortisol Level, AM Specimen; Future  -     Follicle stimulating hormone  -     Luteinizing hormone  -     ESTRADIOL, FREE SERUM; Future  VMA  -     Ambulatory Referral to Endocrinology; Future    Encounter for gynecological examination without abnormal finding    Atrophic vaginitis      Subjective:      Patient ID: Fatoumata Lopes is a 59 y.o. female. HPI patient presents to the office for annual examination. Patient has been experiencing a fever of unknown origin since October. She was hospitalized in November. Work-up at that time was negative. She had an extensive infectious work-up which ultimately resulted in negative results. She had a CAT scan of her chest and abdomen as well as COVID/flu/RSV swabs performed all of which were negative. Urine analysis was normal.  Blood cultures negative. Lyme, CMV, HIV studies negative. TSH and lipid panel were within normal limits. She was evaluated by infectious disease. Since discharge she is still experiencing intermittent low-grade fevers with flushing. Patient is status post TLH BS with lysis of adhesions in May 2019. She denies any vaginal irritation, burning discharge or bleeding. Denies any dysuria, hematuria urgency or urinary incontinence. No GI complaints. Colonoscopy May 2022. Polyps identified. Advised repeat in 5 years. Osteoporosis screening.   DEXA scan ordered by PCP    The following portions of the patient's history were reviewed and updated as appropriate: She  has a past medical history of Abnormal bleeding in menstrual cycle, Adenomyosis, Allergic reaction, Allergic rhinitis, Anxiety, Burn, Depression, Diffuse cystic mastopathy, Diffuse cystic mastopathy, Endometriosis, Fibroid uterus, [place] (hard of hearing), HPV (human papilloma virus) infection (October 2018), Irregular heart beat, Irregular menses (2017), Irritable bowel syndrome, Migraines, MVP (mitral valve prolapse), Ovarian cyst (March 2019), Pelvic pain, PONV (postoperative nausea and vomiting), Snores, and Stress incontinence. She   Patient Active Problem List    Diagnosis Date Noted    Low grade fever 11/20/2023    Acute non-recurrent maxillary sinusitis 11/08/2023    Obesity, Class II, BMI 35-39.9 10/25/2021    Osteoarthritis of right knee 12/09/2020    Adenomyosis 04/24/2019    Chronic abdominal pain 04/03/2018    Anxiety and depression 10/25/2017    Anxiety 05/19/2015    Migraine headache 05/17/2015    Vitamin D deficiency 05/30/2014     She  has a past surgical history that includes Nasal septum surgery; Endometrial ablation; Appendectomy; Abdominal surgery; Knee arthroscopy (Right); [place] tooth extraction; EGD; Colonoscopy; Dilation and curettage of uterus; pr laps total hysterect 250 gm/< w/rmvl tube/ovary (N/A, 5/13/2019); Hysterectomy (May 13, 2019); and Hysteroscopy (2012). Her family history includes Anxiety disorder in her mother; Cancer in her maternal grandfather; Heart attack in her maternal grandmother; Heart failure in her maternal grandmother; Heart murmur in her mother; Hypertension in her paternal grandfather; Migraines in her mother; No Known Problems in her daughter; Valvular heart disease in her mother. She  reports that she quit smoking about 28 years ago. Her smoking use included cigarettes. She has a 2.50 pack-year smoking history. She has never used smokeless tobacco. She reports current alcohol use. She reports that she does not use drugs.   Current Outpatient Medications   Medication Sig Dispense Refill    EPINEPHrine (EPIPEN) 0.3 mg/0.3 mL SOAJ Inject 0.3 mL (0.3 mg total) into a muscle once for 1 dose 2 each 1    LORazepam (ATIVAN) 0.5 mg tablet Take 1 tablet (0.5 mg total) by mouth as needed (prn) 30 tablet 0    sertraline (ZOLOFT) 25 mg tablet Take 2 tablets (50 mg total) by mouth daily 90 tablet 1    SUMAtriptan (IMITREX) 100 mg tablet Take 1 tablet (100 mg total) by mouth every 2 (two) hours as needed for migraine May repeat in 2 hours if needed 18 tablet 1     No current facility-administered medications for this visit. Current Outpatient Medications on File Prior to Visit   Medication Sig    EPINEPHrine (EPIPEN) 0.3 mg/0.3 mL SOAJ Inject 0.3 mL (0.3 mg total) into a muscle once for 1 dose    LORazepam (ATIVAN) 0.5 mg tablet Take 1 tablet (0.5 mg total) by mouth as needed (prn)    sertraline (ZOLOFT) 25 mg tablet Take 2 tablets (50 mg total) by mouth daily    SUMAtriptan (IMITREX) 100 mg tablet Take 1 tablet (100 mg total) by mouth every 2 (two) hours as needed for migraine May repeat in 2 hours if needed     No current facility-administered medications on file prior to visit. She is allergic to bee venom, eggs or egg-derived products - food allergy, fluzone [influenza virus vaccine], iodine - food allergy, shellfish-derived products - food allergy, and ciprofloxacin. .    Review of Systems   Constitutional:  Positive for fever. Negative for diaphoresis, fatigue and unexpected weight change. HENT:  Negative for sore throat and trouble swallowing. Gastrointestinal: Negative. Genitourinary: Negative. Objective:      /80   Ht 5' 2" (1.575 m)   Wt 89.4 kg (197 lb)   LMP 05/08/2019   BMI 36.03 kg/m²          Physical Exam  Vitals reviewed. Cardiovascular:      Rate and Rhythm: Normal rate and regular rhythm. Pulses: Normal pulses. Heart sounds: Normal heart sounds. No murmur heard. Pulmonary:      Effort: Pulmonary effort is normal. No respiratory distress. Breath sounds: Normal breath sounds. Chest:   Breasts:     Right: No swelling, bleeding, inverted nipple, mass, nipple discharge, skin change or tenderness. Left: No swelling, bleeding, inverted nipple, mass, nipple discharge, skin change or tenderness.    Abdominal: General: There is no distension. Palpations: Abdomen is soft. There is no mass. Tenderness: There is no abdominal tenderness. There is no guarding or rebound. Hernia: No hernia is present. There is no hernia in the left inguinal area or right inguinal area. Genitourinary:     General: Normal vulva. Labia:         Right: No rash, tenderness or lesion. Left: No rash, tenderness or lesion. Vagina: Normal.      Uterus: Absent. Adnexa:         Right: No mass, tenderness or fullness. Left: No mass, tenderness or fullness. Musculoskeletal:      Cervical back: Normal range of motion and neck supple. No tenderness. Lymphadenopathy:      Cervical: No cervical adenopathy. Upper Body:      Right upper body: No supraclavicular, axillary or pectoral adenopathy. Left upper body: No supraclavicular, axillary or pectoral adenopathy. Lower Body: No right inguinal adenopathy. No left inguinal adenopathy. Neurological:      Mental Status: She is alert.

## 2023-11-30 ENCOUNTER — TELEPHONE (OUTPATIENT)
Dept: ENDOCRINOLOGY | Facility: CLINIC | Age: 58
End: 2023-11-30

## 2023-12-04 ENCOUNTER — TELEPHONE (OUTPATIENT)
Dept: GYNECOLOGY | Facility: CLINIC | Age: 58
End: 2023-12-04

## 2023-12-10 DIAGNOSIS — F32.9 REACTIVE DEPRESSION: ICD-10-CM

## 2023-12-10 DIAGNOSIS — G43.909 MIGRAINE WITHOUT STATUS MIGRAINOSUS, NOT INTRACTABLE, UNSPECIFIED MIGRAINE TYPE: ICD-10-CM

## 2023-12-11 RX ORDER — SERTRALINE HYDROCHLORIDE 25 MG/1
50 TABLET, FILM COATED ORAL DAILY
Qty: 90 TABLET | Refills: 0 | OUTPATIENT
Start: 2023-12-11

## 2023-12-12 RX ORDER — SUMATRIPTAN 100 MG/1
100 TABLET, FILM COATED ORAL EVERY 2 HOUR PRN
Qty: 18 TABLET | Refills: 0 | Status: SHIPPED | OUTPATIENT
Start: 2023-12-12

## 2023-12-15 ENCOUNTER — OFFICE VISIT (OUTPATIENT)
Dept: FAMILY MEDICINE CLINIC | Facility: CLINIC | Age: 58
End: 2023-12-15
Payer: COMMERCIAL

## 2023-12-15 ENCOUNTER — APPOINTMENT (OUTPATIENT)
Dept: LAB | Facility: CLINIC | Age: 58
End: 2023-12-15
Payer: COMMERCIAL

## 2023-12-15 VITALS
RESPIRATION RATE: 16 BRPM | HEIGHT: 62 IN | BODY MASS INDEX: 36.58 KG/M2 | HEART RATE: 60 BPM | WEIGHT: 198.8 LBS | SYSTOLIC BLOOD PRESSURE: 128 MMHG | OXYGEN SATURATION: 99 % | TEMPERATURE: 99.2 F | DIASTOLIC BLOOD PRESSURE: 88 MMHG

## 2023-12-15 DIAGNOSIS — F41.9 ANXIETY: ICD-10-CM

## 2023-12-15 DIAGNOSIS — R50.9 FEVER, UNSPECIFIED FEVER CAUSE: Primary | ICD-10-CM

## 2023-12-15 DIAGNOSIS — R50.9 FEVER, UNSPECIFIED FEVER CAUSE: ICD-10-CM

## 2023-12-15 LAB
ANA SER QL IA: NEGATIVE
BASOPHILS # BLD AUTO: 0.02 THOUSANDS/ÂΜL (ref 0–0.1)
BASOPHILS NFR BLD AUTO: 0 % (ref 0–1)
EOSINOPHIL # BLD AUTO: 0.06 THOUSAND/ÂΜL (ref 0–0.61)
EOSINOPHIL NFR BLD AUTO: 1 % (ref 0–6)
ERYTHROCYTE [DISTWIDTH] IN BLOOD BY AUTOMATED COUNT: 14 % (ref 11.6–15.1)
HCT VFR BLD AUTO: 38.6 % (ref 34.8–46.1)
HGB BLD-MCNC: 12.3 G/DL (ref 11.5–15.4)
IMM GRANULOCYTES # BLD AUTO: 0.01 THOUSAND/UL (ref 0–0.2)
IMM GRANULOCYTES NFR BLD AUTO: 0 % (ref 0–2)
LYMPHOCYTES # BLD AUTO: 1.67 THOUSANDS/ÂΜL (ref 0.6–4.47)
LYMPHOCYTES NFR BLD AUTO: 34 % (ref 14–44)
MCH RBC QN AUTO: 28.9 PG (ref 26.8–34.3)
MCHC RBC AUTO-ENTMCNC: 31.9 G/DL (ref 31.4–37.4)
MCV RBC AUTO: 91 FL (ref 82–98)
MONOCYTES # BLD AUTO: 0.46 THOUSAND/ÂΜL (ref 0.17–1.22)
MONOCYTES NFR BLD AUTO: 10 % (ref 4–12)
NEUTROPHILS # BLD AUTO: 2.63 THOUSANDS/ÂΜL (ref 1.85–7.62)
NEUTS SEG NFR BLD AUTO: 55 % (ref 43–75)
NRBC BLD AUTO-RTO: 0 /100 WBCS
PLATELET # BLD AUTO: 253 THOUSANDS/UL (ref 149–390)
PMV BLD AUTO: 12.6 FL (ref 8.9–12.7)
RBC # BLD AUTO: 4.26 MILLION/UL (ref 3.81–5.12)
WBC # BLD AUTO: 4.85 THOUSAND/UL (ref 4.31–10.16)

## 2023-12-15 PROCEDURE — 83835 ASSAY OF METANEPHRINES: CPT

## 2023-12-15 PROCEDURE — 86038 ANTINUCLEAR ANTIBODIES: CPT

## 2023-12-15 PROCEDURE — 85025 COMPLETE CBC W/AUTO DIFF WBC: CPT

## 2023-12-15 PROCEDURE — 36415 COLL VENOUS BLD VENIPUNCTURE: CPT

## 2023-12-15 PROCEDURE — 99214 OFFICE O/P EST MOD 30 MIN: CPT | Performed by: FAMILY MEDICINE

## 2023-12-15 RX ORDER — LORAZEPAM 0.5 MG/1
0.5 TABLET ORAL AS NEEDED
Qty: 30 TABLET | Refills: 0 | Status: SHIPPED | OUTPATIENT
Start: 2023-12-15

## 2023-12-15 NOTE — ASSESSMENT & PLAN NOTE
Slightly increased due to concerns with health; c/w current tx; refilled lorazepam; f/u guidance given

## 2023-12-15 NOTE — ASSESSMENT & PLAN NOTE
Continues to have recurrent fevers; infectious disease evaluation has been unremarkable; initial hormonal testing unremarkable; advised f/u with endocrinology as scheduled; check metanephrines and arcadio and f/u with results; f/u guidance given; exam unremarkable today in office

## 2023-12-15 NOTE — PROGRESS NOTES
Assessment/Plan:     1. Fever, unspecified fever cause  Assessment & Plan:  Continues to have recurrent fevers; infectious disease evaluation has been unremarkable; initial hormonal testing unremarkable; advised f/u with endocrinology as scheduled; check metanephrines and arcadio and f/u with results; f/u guidance given; exam unremarkable today in office    Orders:  -     Metanephrine, Fractionated Plasma Free; Future  -     Metanephrines Fractionated, urine, 24 hour; Future  -     ARCADIO Screen w/ Reflex to Titer/Pattern; Future  -     CBC and differential; Future    2. Anxiety  Assessment & Plan:  Slightly increased due to concerns with health; c/w current tx; refilled lorazepam; f/u guidance given    Orders:  -     Metanephrine, Fractionated Plasma Free; Future  -     Metanephrines Fractionated, urine, 24 hour; Future  -     LORazepam (ATIVAN) 0.5 mg tablet; Take 1 tablet (0.5 mg total) by mouth as needed (prn)          Subjective:      Patient ID: Ponce Cook is a 59 y.o. female. Patient is here for hospital follow up for fevers of unknown source. Infectious disease evaluation was unremarkable for infection. Patient states she did not run a fever while in hospital. She is going to endocrinology on 1/8. States she gets episodes of fever about daily with highest temp of 101 F earlier this week. No known elevated BP or tachycardia but has been having some panic attacks. She does have hx of anxiety and thought her anxiety is increased due to home stress as her mother is moving in. Feels fatigued with this but no cough, congestion, URI or GI symptoms. No headaches or neck pain. "Hospital Course:      Ponce Cook is a 59 y.o. female patient who originally presented to the hospital on 11/20/2023 due to ongoing low-grade fevers. Patient underwent extensive infectious workup which ultimately resulted in negative results.   Patient had CAT scans of her chest and abdomen as well as COVID/flu/RSV swabs performed. Urine analysis was normal.  She had went evaluation of blood cultures which have remained negative at 24 hours. Her Lyme, CMV, HIV studies were negative. TSH and lipid panel within normal limits. She was evaluated by infectious disease. She was monitored off antibiotics and did not spike a fever here. She was noted to have some elevated blood pressure upon arrival however since then, BP has trended down off of medication. It was recommended if she have persistent hypertension along with tachycardia and fevers to undergo pheochromocytoma workup. Otherwise recommended to have endocrinology/hormonal evaluation. In conclusion, patient is stable for discharge at this time."           The following portions of the patient's history were reviewed and updated as appropriate: allergies, current medications, past family history, past medical history, past social history, past surgical history, and problem list.    Review of Systems   Constitutional:  Positive for fever. HENT:  Negative for congestion. Respiratory:  Negative for cough, shortness of breath and wheezing. Cardiovascular:  Negative for chest pain. Gastrointestinal:  Negative for diarrhea. Endocrine: Positive for heat intolerance. Neurological:  Negative for headaches. Objective:      /88 (BP Location: Left arm, Patient Position: Sitting, Cuff Size: Large)   Pulse 60   Temp 99.2 °F (37.3 °C) (Temporal)   Resp 16   Ht 5' 2" (1.575 m)   Wt 90.2 kg (198 lb 12.8 oz)   LMP 05/08/2019   SpO2 99%   BMI 36.36 kg/m²          Physical Exam  Vitals reviewed. Constitutional:       General: She is not in acute distress. Appearance: Normal appearance. She is not ill-appearing, toxic-appearing or diaphoretic. HENT:      Head: Normocephalic and atraumatic. Eyes:      General: No scleral icterus. Right eye: No discharge. Left eye: No discharge.       Conjunctiva/sclera: Conjunctivae normal. Cardiovascular:      Rate and Rhythm: Normal rate and regular rhythm. Pulses: Normal pulses. Heart sounds: Normal heart sounds. No murmur heard. No gallop. Pulmonary:      Effort: Pulmonary effort is normal. No respiratory distress. Breath sounds: Normal breath sounds. No stridor. No wheezing, rhonchi or rales. Musculoskeletal:      Right lower leg: No edema. Left lower leg: No edema. Neurological:      General: No focal deficit present. Mental Status: She is alert and oriented to person, place, and time. Psychiatric:         Mood and Affect: Mood normal.         Behavior: Behavior normal.         Thought Content:  Thought content normal.         Judgment: Judgment normal.

## 2023-12-22 ENCOUNTER — HOSPITAL ENCOUNTER (OUTPATIENT)
Dept: BONE DENSITY | Facility: MEDICAL CENTER | Age: 58
Discharge: HOME | End: 2023-12-22
Payer: COMMERCIAL

## 2023-12-22 DIAGNOSIS — M43.9 COMPRESSION DEFORMITY OF VERTEBRA: ICD-10-CM

## 2023-12-22 PROCEDURE — 77080 DXA BONE DENSITY AXIAL: CPT

## 2023-12-27 ENCOUNTER — APPOINTMENT (OUTPATIENT)
Dept: LAB | Facility: CLINIC | Age: 58
End: 2023-12-27
Payer: COMMERCIAL

## 2023-12-27 PROCEDURE — 83835 ASSAY OF METANEPHRINES: CPT

## 2023-12-28 LAB
METANEPH FREE SERPL-MCNC: <25 PG/ML (ref 0–88)
NORMETANEPHRINE SERPL-MCNC: 53.9 PG/ML (ref 0–244)

## 2024-01-01 LAB
METANEPH 24H UR-MRATE: 56 UG/24 HR (ref 36–209)
METANEPHS 24H UR-MCNC: 43 UG/L
NORMETANEPHRINE 24H UR-MCNC: 198 UG/L
NORMETANEPHRINE 24H UR-MRATE: 257 UG/24 HR (ref 131–612)

## 2024-01-07 PROBLEM — J01.00 ACUTE NON-RECURRENT MAXILLARY SINUSITIS: Status: RESOLVED | Noted: 2023-11-08 | Resolved: 2024-01-07

## 2024-01-08 ENCOUNTER — CONSULT (OUTPATIENT)
Dept: ENDOCRINOLOGY | Facility: CLINIC | Age: 59
End: 2024-01-08
Payer: COMMERCIAL

## 2024-01-08 VITALS
HEART RATE: 64 BPM | SYSTOLIC BLOOD PRESSURE: 140 MMHG | WEIGHT: 201.6 LBS | DIASTOLIC BLOOD PRESSURE: 88 MMHG | HEIGHT: 62 IN | BODY MASS INDEX: 37.1 KG/M2

## 2024-01-08 DIAGNOSIS — R50.9 FEVER, UNKNOWN ORIGIN: ICD-10-CM

## 2024-01-08 PROCEDURE — 99243 OFF/OP CNSLTJ NEW/EST LOW 30: CPT | Performed by: INTERNAL MEDICINE

## 2024-02-13 PROBLEM — R50.9 FEVER: Status: RESOLVED | Noted: 2023-11-20 | Resolved: 2024-02-13

## 2024-02-16 DIAGNOSIS — F32.9 REACTIVE DEPRESSION: ICD-10-CM

## 2024-02-16 RX ORDER — SERTRALINE HYDROCHLORIDE 25 MG/1
50 TABLET, FILM COATED ORAL DAILY
Qty: 180 TABLET | Refills: 0 | Status: SHIPPED | OUTPATIENT
Start: 2024-02-16

## 2024-02-21 ENCOUNTER — OFFICE VISIT (OUTPATIENT)
Dept: URGENT CARE | Facility: CLINIC | Age: 59
End: 2024-02-21
Payer: COMMERCIAL

## 2024-02-21 VITALS
RESPIRATION RATE: 20 BRPM | SYSTOLIC BLOOD PRESSURE: 132 MMHG | TEMPERATURE: 97.2 F | DIASTOLIC BLOOD PRESSURE: 84 MMHG | HEART RATE: 60 BPM | OXYGEN SATURATION: 99 %

## 2024-02-21 DIAGNOSIS — N39.0 URINARY TRACT INFECTION WITHOUT HEMATURIA, SITE UNSPECIFIED: Primary | ICD-10-CM

## 2024-02-21 LAB
SL AMB  POCT GLUCOSE, UA: ABNORMAL
SL AMB LEUKOCYTE ESTERASE,UA: ABNORMAL
SL AMB POCT BILIRUBIN,UA: ABNORMAL
SL AMB POCT BLOOD,UA: ABNORMAL
SL AMB POCT CLARITY,UA: ABNORMAL
SL AMB POCT COLOR,UA: YELLOW
SL AMB POCT KETONES,UA: ABNORMAL
SL AMB POCT NITRITE,UA: ABNORMAL
SL AMB POCT PH,UA: 5
SL AMB POCT SPECIFIC GRAVITY,UA: 1.02
SL AMB POCT URINE PROTEIN: ABNORMAL
SL AMB POCT UROBILINOGEN: 0.2

## 2024-02-21 PROCEDURE — 81002 URINALYSIS NONAUTO W/O SCOPE: CPT

## 2024-02-21 PROCEDURE — 87086 URINE CULTURE/COLONY COUNT: CPT

## 2024-02-21 PROCEDURE — 99213 OFFICE O/P EST LOW 20 MIN: CPT

## 2024-02-21 RX ORDER — SULFAMETHOXAZOLE AND TRIMETHOPRIM 800; 160 MG/1; MG/1
1 TABLET ORAL EVERY 12 HOURS SCHEDULED
Qty: 10 TABLET | Refills: 0 | Status: SHIPPED | OUTPATIENT
Start: 2024-02-21 | End: 2024-02-26

## 2024-02-21 RX ORDER — CEPHALEXIN 500 MG/1
500 CAPSULE ORAL EVERY 12 HOURS SCHEDULED
Qty: 14 CAPSULE | Refills: 0 | Status: SHIPPED | OUTPATIENT
Start: 2024-02-21 | End: 2024-02-21 | Stop reason: ALTCHOICE
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
## 2024-02-21 NOTE — PATIENT INSTRUCTIONS
Bactrim prescribed - take as directed.     Take antibiotic as prescribed - take the entire course of medication even if you start feeling better. Future infections can be difficult to treat if you do not take all of the pills.    Urine culture sent, we will notify you if any changes need to be made to your medication.    You are encouraged to try over-the-counter Azo for symptom relief.     Stay hydrated with water to help flush out bacteria. Avoid bladder irritants such as citrus, caffeine, chocolate, and coffee.     Follow up with your PCP in 3-5 days.    Go to the ER if symptoms worsen.

## 2024-02-22 LAB — BACTERIA UR CULT: NORMAL

## 2024-03-01 ENCOUNTER — OFFICE VISIT (OUTPATIENT)
Dept: FAMILY MEDICINE CLINIC | Facility: CLINIC | Age: 59
End: 2024-03-01
Payer: COMMERCIAL

## 2024-03-01 VITALS
BODY MASS INDEX: 35.99 KG/M2 | TEMPERATURE: 98.4 F | WEIGHT: 195.6 LBS | HEART RATE: 60 BPM | OXYGEN SATURATION: 98 % | SYSTOLIC BLOOD PRESSURE: 112 MMHG | HEIGHT: 62 IN | DIASTOLIC BLOOD PRESSURE: 78 MMHG

## 2024-03-01 DIAGNOSIS — R10.9 LEFT FLANK PAIN: ICD-10-CM

## 2024-03-01 DIAGNOSIS — R30.9 PAIN WITH URINATION: Primary | ICD-10-CM

## 2024-03-01 DIAGNOSIS — R50.9 ELEVATED TEMPERATURE: ICD-10-CM

## 2024-03-01 DIAGNOSIS — R35.0 URINARY FREQUENCY: ICD-10-CM

## 2024-03-01 LAB
SL AMB  POCT GLUCOSE, UA: NORMAL
SL AMB LEUKOCYTE ESTERASE,UA: NORMAL
SL AMB POCT BILIRUBIN,UA: NORMAL
SL AMB POCT BLOOD,UA: NORMAL
SL AMB POCT CLARITY,UA: CLEAR
SL AMB POCT COLOR,UA: YELLOW
SL AMB POCT KETONES,UA: NORMAL
SL AMB POCT NITRITE,UA: NORMAL
SL AMB POCT PH,UA: 6
SL AMB POCT SPECIFIC GRAVITY,UA: 1.02
SL AMB POCT URINE PROTEIN: NORMAL
SL AMB POCT UROBILINOGEN: 0.2

## 2024-03-01 PROCEDURE — 81003 URINALYSIS AUTO W/O SCOPE: CPT | Performed by: FAMILY MEDICINE

## 2024-03-01 PROCEDURE — 99214 OFFICE O/P EST MOD 30 MIN: CPT | Performed by: FAMILY MEDICINE

## 2024-03-01 PROCEDURE — 87086 URINE CULTURE/COLONY COUNT: CPT | Performed by: FAMILY MEDICINE

## 2024-03-01 RX ORDER — TAMSULOSIN HYDROCHLORIDE 0.4 MG/1
0.4 CAPSULE ORAL
Qty: 14 CAPSULE | Refills: 0 | Status: SHIPPED | OUTPATIENT
Start: 2024-03-01

## 2024-03-01 RX ORDER — NITROFURANTOIN 25; 75 MG/1; MG/1
100 CAPSULE ORAL 2 TIMES DAILY
Qty: 10 CAPSULE | Refills: 0 | Status: SHIPPED | OUTPATIENT
Start: 2024-03-01 | End: 2024-03-06

## 2024-03-01 NOTE — ASSESSMENT & PLAN NOTE
Had f/u with endocrinology without concern for endocrine etiology; advised to consider autonomic dysfunction and referred to neurology

## 2024-03-01 NOTE — PROGRESS NOTES
`Assessment/Plan:     1. Pain with urination  Assessment & Plan:  Repeat urine testing; will change abx since did have some improvement of emptying bladder with abx and worsened again off; initial urine culture c/w mixed contaminants; concern for possible passage of stone and irritation given findings after urinating; advised check u/s and labs and will f/u with urology if urine culture negative and symptoms persist; last imaging negative for kidney stones    Orders:  -     POCT urine dip auto non-scope  -     Urine culture; Future  -     Urine culture  -     nitrofurantoin (MACROBID) 100 mg capsule; Take 1 capsule (100 mg total) by mouth 2 (two) times a day for 5 days  -     tamsulosin (FLOMAX) 0.4 mg; Take 1 capsule (0.4 mg total) by mouth daily with dinner  -      kidney and bladder; Future; Expected date: 03/01/2024  -     Uric acid; Future  -     Basic metabolic panel; Future  -     CBC and differential; Future    2. Urinary frequency  -     POCT urine dip auto non-scope  -     Urine culture; Future  -     Urine culture  -     Basic metabolic panel; Future    3. Elevated temperature  Assessment & Plan:  Had f/u with endocrinology without concern for endocrine etiology; advised to consider autonomic dysfunction and referred to neurology    Orders:  -     Ambulatory Referral to Neurology; Future  -     CBC and differential; Future    4. Left flank pain  -     Uric acid; Future          Subjective:      Patient ID: Linda Velazquez is a 58 y.o. female.    Patient states she started with vomiting and nausea on Sunday 2/18 and fever and was traveling then came home on Tuesday. She felt she was developing bladder issues prior to that. She states she felt bloated and sleeping with heating pad. Passed something in her urine and thought she passed a stone. It was a dark/reddish color in toilet and solid appearing. Patient states she is having difficulty with urination with hesitancy and dysuria. Went to Urgent care  "for concerns of UTI and was put on Bactrim. Did not notice change in symptoms. Has not had a fever since the beginning. No change in urinary symptoms. When she was on the antibiotic she was able to urinate better. Not urinating as much.         The following portions of the patient's history were reviewed and updated as appropriate: allergies, current medications, past family history, past medical history, past social history, past surgical history, and problem list.    Review of Systems   Constitutional:  Negative for chills and fever.   Genitourinary:  Positive for dysuria and frequency.         Objective:      /78 (BP Location: Left arm, Patient Position: Sitting, Cuff Size: Large)   Pulse 60   Temp 98.4 °F (36.9 °C) (Temporal)   Ht 5' 2\" (1.575 m)   Wt 88.7 kg (195 lb 9.6 oz)   LMP 05/08/2019   SpO2 98%   BMI 35.78 kg/m²          Physical Exam  Vitals reviewed.   Constitutional:       General: She is not in acute distress.     Appearance: Normal appearance. She is not ill-appearing, toxic-appearing or diaphoretic.   HENT:      Head: Normocephalic and atraumatic.   Eyes:      General: No scleral icterus.        Right eye: No discharge.         Left eye: No discharge.      Conjunctiva/sclera: Conjunctivae normal.   Cardiovascular:      Rate and Rhythm: Normal rate and regular rhythm.      Pulses: Normal pulses.      Heart sounds: Normal heart sounds. No murmur heard.     No gallop.   Pulmonary:      Effort: Pulmonary effort is normal. No respiratory distress.      Breath sounds: Normal breath sounds. No stridor. No wheezing, rhonchi or rales.   Musculoskeletal:      Right lower leg: No edema.      Left lower leg: No edema.   Neurological:      General: No focal deficit present.      Mental Status: She is alert and oriented to person, place, and time.   Psychiatric:         Mood and Affect: Mood normal.         Behavior: Behavior normal.         Thought Content: Thought content normal.         " Judgment: Judgment normal.

## 2024-03-01 NOTE — ASSESSMENT & PLAN NOTE
Repeat urine testing; will change abx since did have some improvement of emptying bladder with abx and worsened again off; initial urine culture c/w mixed contaminants; concern for possible passage of stone and irritation given findings after urinating; advised check u/s and labs and will f/u with urology if urine culture negative and symptoms persist; last imaging negative for kidney stones

## 2024-03-02 LAB — BACTERIA UR CULT: NORMAL

## 2024-03-05 ENCOUNTER — PATIENT MESSAGE (OUTPATIENT)
Dept: FAMILY MEDICINE CLINIC | Facility: CLINIC | Age: 59
End: 2024-03-05

## 2024-03-05 ENCOUNTER — TELEPHONE (OUTPATIENT)
Dept: FAMILY MEDICINE CLINIC | Facility: CLINIC | Age: 59
End: 2024-03-05

## 2024-03-05 DIAGNOSIS — R82.90 ABNORMAL URINE SEDIMENT: ICD-10-CM

## 2024-03-05 DIAGNOSIS — R30.9 PAIN WITH URINATION: Primary | ICD-10-CM

## 2024-03-05 NOTE — TELEPHONE ENCOUNTER
"Phone call from patient stating she passed something in her urine and is asking if we want to evaluate it or send it out to the lab. She describes it as a gray square \"mesh\" appearing substance.  She will take a picture and send via [employer] to Dr. Aguilar for further guidance.  "

## 2024-03-06 PROCEDURE — 88305 TISSUE EXAM BY PATHOLOGIST: CPT | Performed by: STUDENT IN AN ORGANIZED HEALTH CARE EDUCATION/TRAINING PROGRAM

## 2024-03-06 NOTE — PATIENT COMMUNICATION
I ordered a tissue exam on specimen to see if they are able to analyze. I'm not sure we will be able to do this looking at specimen. I do want her to follow up with urology though and will place a referral for her. Her urine culture was negative for infection.

## 2024-03-07 ENCOUNTER — TELEPHONE (OUTPATIENT)
Age: 59
End: 2024-03-07

## 2024-03-07 NOTE — TELEPHONE ENCOUNTER
Call placed to patient and spoke with her. Pt is scheduled on 4-3-2024 at 11:40am with AP for discussion

## 2024-03-07 NOTE — TELEPHONE ENCOUNTER
Per MD recommendations needs an appointment with an AP. Nothing available until May. Please assist in scheduling.

## 2024-03-07 NOTE — TELEPHONE ENCOUNTER
Pt under care of: Issac     Last Seen: 4/24/23 for gross hematuria     Pt calling due to:    Patient calling in stating she was referred back to urology from her PCP. She states she is having strange and abnormal sediment in her urine again along with painful urination. Had last UC on 3/1/24 which came back normal and she is still having this. They did place her on an antibiotic anyway because she was symptomatic. Please call patient to further triage and give patient next steps. Each office is scheduling out pretty far so not sure what time frame would be for this. Patient states this happened last year too and she had cysto.     Patient will also have blood work and US done unless urology physician feels other imaging is warranted.      Please schedule from referral in chart.     Pt can be reached at: [phone]    no

## 2024-03-07 NOTE — TELEPHONE ENCOUNTER
"Patient states she finished antibiotics and she is also on Flomax. Last night she passed two \"grey\" looking flesh type substance. Pictures are under the PCP messages- please review the pictures. She has mild left side kidney area pain. She does experience difficulty voiding at times. Denies any N/V, her temperature varies and had fevers last week. No chills at this time. She is hydrating well with water. She has an ultrasound to be done on Tuesday and blood work to be done tomorrow. Please advise of any further recommendations.   "

## 2024-03-08 ENCOUNTER — APPOINTMENT (OUTPATIENT)
Dept: LAB | Facility: CLINIC | Age: 59
End: 2024-03-08
Payer: COMMERCIAL

## 2024-03-08 DIAGNOSIS — R10.9 LEFT FLANK PAIN: ICD-10-CM

## 2024-03-08 DIAGNOSIS — R35.0 URINARY FREQUENCY: ICD-10-CM

## 2024-03-08 DIAGNOSIS — R50.9 ELEVATED TEMPERATURE: ICD-10-CM

## 2024-03-08 DIAGNOSIS — R30.9 PAIN WITH URINATION: ICD-10-CM

## 2024-03-08 LAB
ANION GAP SERPL CALCULATED.3IONS-SCNC: 8 MMOL/L
BASOPHILS # BLD AUTO: 0.04 THOUSANDS/ÂΜL (ref 0–0.1)
BASOPHILS NFR BLD AUTO: 1 % (ref 0–1)
BUN SERPL-MCNC: 17 MG/DL (ref 5–25)
CALCIUM SERPL-MCNC: 9 MG/DL (ref 8.4–10.2)
CHLORIDE SERPL-SCNC: 106 MMOL/L (ref 96–108)
CO2 SERPL-SCNC: 24 MMOL/L (ref 21–32)
CREAT SERPL-MCNC: 0.66 MG/DL (ref 0.6–1.3)
EOSINOPHIL # BLD AUTO: 0.09 THOUSAND/ÂΜL (ref 0–0.61)
EOSINOPHIL NFR BLD AUTO: 2 % (ref 0–6)
ERYTHROCYTE [DISTWIDTH] IN BLOOD BY AUTOMATED COUNT: 13.8 % (ref 11.6–15.1)
GFR SERPL CREATININE-BSD FRML MDRD: 96 ML/MIN/1.73SQ M
GLUCOSE P FAST SERPL-MCNC: 89 MG/DL (ref 65–99)
HCT VFR BLD AUTO: 38.5 % (ref 34.8–46.1)
HGB BLD-MCNC: 12.6 G/DL (ref 11.5–15.4)
IMM GRANULOCYTES # BLD AUTO: 0.02 THOUSAND/UL (ref 0–0.2)
IMM GRANULOCYTES NFR BLD AUTO: 0 % (ref 0–2)
LYMPHOCYTES # BLD AUTO: 1.64 THOUSANDS/ÂΜL (ref 0.6–4.47)
LYMPHOCYTES NFR BLD AUTO: 34 % (ref 14–44)
MCH RBC QN AUTO: 29.6 PG (ref 26.8–34.3)
MCHC RBC AUTO-ENTMCNC: 32.7 G/DL (ref 31.4–37.4)
MCV RBC AUTO: 91 FL (ref 82–98)
MONOCYTES # BLD AUTO: 0.47 THOUSAND/ÂΜL (ref 0.17–1.22)
MONOCYTES NFR BLD AUTO: 10 % (ref 4–12)
NEUTROPHILS # BLD AUTO: 2.51 THOUSANDS/ÂΜL (ref 1.85–7.62)
NEUTS SEG NFR BLD AUTO: 53 % (ref 43–75)
NRBC BLD AUTO-RTO: 0 /100 WBCS
PLATELET # BLD AUTO: 255 THOUSANDS/UL (ref 149–390)
PMV BLD AUTO: 12.4 FL (ref 8.9–12.7)
POTASSIUM SERPL-SCNC: 3.9 MMOL/L (ref 3.5–5.3)
RBC # BLD AUTO: 4.25 MILLION/UL (ref 3.81–5.12)
SODIUM SERPL-SCNC: 138 MMOL/L (ref 135–147)
URATE SERPL-MCNC: 3.8 MG/DL (ref 2–7.5)
WBC # BLD AUTO: 4.77 THOUSAND/UL (ref 4.31–10.16)

## 2024-03-08 PROCEDURE — 84550 ASSAY OF BLOOD/URIC ACID: CPT

## 2024-03-08 PROCEDURE — 80048 BASIC METABOLIC PNL TOTAL CA: CPT

## 2024-03-08 PROCEDURE — 85025 COMPLETE CBC W/AUTO DIFF WBC: CPT

## 2024-03-08 PROCEDURE — 36415 COLL VENOUS BLD VENIPUNCTURE: CPT

## 2024-03-11 PROCEDURE — 88305 TISSUE EXAM BY PATHOLOGIST: CPT | Performed by: STUDENT IN AN ORGANIZED HEALTH CARE EDUCATION/TRAINING PROGRAM

## 2024-03-12 ENCOUNTER — HOSPITAL ENCOUNTER (OUTPATIENT)
Dept: ULTRASOUND IMAGING | Facility: HOSPITAL | Age: 59
Discharge: HOME | End: 2024-03-12
Payer: COMMERCIAL

## 2024-03-12 DIAGNOSIS — R30.9 PAIN WITH URINATION: ICD-10-CM

## 2024-03-12 PROCEDURE — 76775 US EXAM ABDO BACK WALL LIM: CPT

## 2024-03-13 ENCOUNTER — DOCUMENTATION (OUTPATIENT)
Dept: FAMILY MEDICINE CLINIC | Facility: CLINIC | Age: 59
End: 2024-03-13

## 2024-03-13 DIAGNOSIS — R30.9 PAIN WITH URINATION: Primary | ICD-10-CM

## 2024-03-13 RX ORDER — FLUCONAZOLE 150 MG/1
150 TABLET ORAL ONCE
Qty: 1 TABLET | Refills: 0 | Status: SHIPPED | OUTPATIENT
Start: 2024-03-13 | End: 2024-03-13

## 2024-03-27 RX ORDER — FLUTICASONE PROPIONATE 50 MCG
1 SPRAY, SUSPENSION (ML) NASAL DAILY
COMMUNITY
Start: 2024-03-11

## 2024-03-27 RX ORDER — FLUCONAZOLE 150 MG/1
150 TABLET ORAL ONCE
COMMUNITY
Start: 2024-03-13

## 2024-05-02 ENCOUNTER — OFFICE VISIT (OUTPATIENT)
Dept: URGENT CARE | Facility: CLINIC | Age: 59
End: 2024-05-02
Payer: COMMERCIAL

## 2024-05-02 VITALS
DIASTOLIC BLOOD PRESSURE: 75 MMHG | TEMPERATURE: 99.6 F | OXYGEN SATURATION: 98 % | HEART RATE: 86 BPM | HEIGHT: 62 IN | RESPIRATION RATE: 20 BRPM | BODY MASS INDEX: 35.63 KG/M2 | SYSTOLIC BLOOD PRESSURE: 136 MMHG | WEIGHT: 193.6 LBS

## 2024-05-02 DIAGNOSIS — R68.89 FLU-LIKE SYMPTOMS: Primary | ICD-10-CM

## 2024-05-02 PROCEDURE — S9083 URGENT CARE CENTER GLOBAL: HCPCS | Performed by: NURSE PRACTITIONER

## 2024-05-02 PROCEDURE — G0382 LEV 3 HOSP TYPE B ED VISIT: HCPCS | Performed by: NURSE PRACTITIONER

## 2024-05-02 RX ORDER — OSELTAMIVIR PHOSPHATE 75 MG/1
75 CAPSULE ORAL 2 TIMES DAILY
Qty: 10 CAPSULE | Refills: 0 | Status: SHIPPED | OUTPATIENT
Start: 2024-05-02 | End: 2024-05-07

## 2024-05-02 NOTE — PROGRESS NOTES
[facility]        NAME: Linda Velazquez is a 58 y.o. female  : 1965    MRN: [id number]  DATE: May 2, 2024  TIME: 7:00 PM    Assessment and Plan   Flu-like symptoms [R68.89]  1. Flu-like symptoms  oseltamivir (TAMIFLU) 75 mg capsule        Recommend alternating Tylenol and Advil to double up for symptom management.  Will start Tamiflu.  Risks and benefits reviewed with patient.  Follow-up PCP.  Patient agreement plan.    Patient Instructions     Follow up with PCP in 3-5 days.  Proceed to  ER if symptoms worsen.    Chief Complaint     Chief Complaint   Patient presents with    Fever     Pt presents with fever, chills, myalgia BL LE, decreased appetite, and nausea/vomiting that started on Tuesday. Pt vomited mucus this morning but no other vomiting episodes. Tmax 102.9 tympanic this morning and lowest temp was 100.9 tympanic yesterday. Pt took Advil and Dayquil with no symptom relief.         History of Present Illness   Linda Velazquez presents to the clinic c/o    Fever (Pt presents with fever, chills, myalgia BL LE, decreased appetite, and nausea/vomiting that started on Tuesday. Pt vomited mucus this morning but no other vomiting episodes. Tmax 102.9 tympanic this morning and lowest temp was 100.9 tympanic yesterday. Pt took Advil and Dayquil with no symptom relief.)    Fever - 9 weeks to 74 years   This is a new problem. The current episode started in the past 7 days. The problem occurs constantly. The problem has been waxing and waning. The maximum temperature noted was 102 to 102.9 F. The temperature was taken using a tympanic thermometer. Associated symptoms include congestion, coughing, muscle aches, nausea, sleepiness and a sore throat. Pertinent negatives include no abdominal pain, chest pain, diarrhea, ear pain, headaches, rash, urinary pain, vomiting or wheezing.   Fever  Associated symptoms include congestion, coughing, a fever, nausea and a sore throat. Pertinent negatives include  no abdominal pain, chest pain, headaches, rash or vomiting.       Review of Systems   Review of Systems   Constitutional:  Positive for fever.   HENT:  Positive for congestion and sore throat. Negative for ear pain.    Respiratory:  Positive for cough. Negative for wheezing.    Cardiovascular:  Negative for chest pain.   Gastrointestinal:  Positive for nausea. Negative for abdominal pain, diarrhea and vomiting.   Genitourinary:  Negative for dysuria.   Skin:  Negative for rash.   Neurological:  Negative for headaches.   All other systems reviewed and are negative.        Current Medications     Long-Term Medications   Medication Sig Dispense Refill    EPINEPHrine (EPIPEN) 0.3 mg/0.3 mL SOAJ Inject 0.3 mL (0.3 mg total) into a muscle once for 1 dose 2 each 1    LORazepam (ATIVAN) 0.5 mg tablet Take 1 tablet (0.5 mg total) by mouth as needed (prn) 30 tablet 0    sertraline (ZOLOFT) 25 mg tablet Take 2 tablets (50 mg total) by mouth daily 180 tablet 0    SUMAtriptan (IMITREX) 100 mg tablet Take 1 tablet (100 mg total) by mouth every 2 (two) hours as needed for migraine May repeat in 2 hours if needed 18 tablet 0    tamsulosin (FLOMAX) 0.4 mg Take 1 capsule (0.4 mg total) by mouth daily with dinner 14 capsule 0       Current Allergies     Allergies as of 05/02/2024 - Reviewed 03/27/2024   Allergen Reaction Noted    Bee venom Anaphylaxis 08/11/2015    Eggs or egg-derived products - food allergy Anaphylaxis 04/29/2019    Fluzone [influenza virus vaccine] Anaphylaxis, Shortness Of Breath, Diarrhea, and Throat Swelling 10/19/2018    Iodine - food allergy Tachycardia 09/16/2002    Shellfish-derived products - food allergy Diarrhea and Vomiting 08/11/2015    Ciprofloxacin  06/11/2019            The following portions of the patient's history were reviewed and updated as appropriate: allergies, current medications, past family history, past medical history, past social history, past surgical history and problem  "list.    Objective   /75   Pulse 86   Temp 99.6 °F (37.6 °C)   Resp 20   Ht 5' 2\" (1.575 m)   Wt 87.8 kg (193 lb 9.6 oz)   LMP 05/08/2019   SpO2 98%   BMI 35.41 kg/m²        Physical Exam     Physical Exam  Vitals and nursing note reviewed.   Constitutional:       Appearance: Normal appearance. She is well-developed.   HENT:      Head: Normocephalic and atraumatic.      Right Ear: Hearing, tympanic membrane, ear canal and external ear normal.      Left Ear: Hearing, tympanic membrane, ear canal and external ear normal.      Nose: Nose normal.      Mouth/Throat:      Lips: Pink.      Mouth: Mucous membranes are moist.      Pharynx: Oropharynx is clear.   Eyes:      General: Lids are normal.      Conjunctiva/sclera: Conjunctivae normal.      Pupils: Pupils are equal, round, and reactive to light.   Cardiovascular:      Rate and Rhythm: Normal rate and regular rhythm.      Heart sounds: Normal heart sounds, S1 normal and S2 normal.   Pulmonary:      Effort: Pulmonary effort is normal.      Breath sounds: Normal breath sounds.   Abdominal:      General: Abdomen is flat. Bowel sounds are normal.      Palpations: Abdomen is soft.   Musculoskeletal:         General: Normal range of motion.      Cervical back: Full passive range of motion without pain, normal range of motion and neck supple.   Skin:     General: Skin is warm and dry.   Neurological:      General: No focal deficit present.      Mental Status: She is alert and oriented to person, place, and time.   Psychiatric:         Mood and Affect: Mood normal.         Speech: Speech normal.         Behavior: Behavior normal. Behavior is cooperative.         Thought Content: Thought content normal.         Judgment: Judgment normal.                     "

## 2024-05-03 ENCOUNTER — TELEPHONE (OUTPATIENT)
Age: 59
End: 2024-05-03

## 2024-05-03 DIAGNOSIS — R11.0 NAUSEA: Primary | ICD-10-CM

## 2024-05-03 RX ORDER — ONDANSETRON 4 MG/1
4 TABLET, ORALLY DISINTEGRATING ORAL EVERY 8 HOURS PRN
Qty: 15 TABLET | Refills: 0 | Status: SHIPPED | OUTPATIENT
Start: 2024-05-03

## 2024-05-03 NOTE — TELEPHONE ENCOUNTER
Patient contacted the office this afternoon stating that she was seen at [facility] in [place] yesterday. Was prescribed Tamiflu but still experiencing nausea, can't keep much down including water. States that she was prescribed a pill in the past that she would put under her tongue to help with this, she is asking if the provider is willing to prescribe at this time. Please advise.

## 2024-05-16 DIAGNOSIS — F32.9 REACTIVE DEPRESSION: ICD-10-CM

## 2024-05-16 RX ORDER — SERTRALINE HYDROCHLORIDE 25 MG/1
50 TABLET, FILM COATED ORAL DAILY
Qty: 180 TABLET | Refills: 1 | Status: SHIPPED | OUTPATIENT
Start: 2024-05-16

## 2024-08-26 ENCOUNTER — TRANSITIONAL CARE MANAGEMENT (OUTPATIENT)
Dept: FAMILY MEDICINE CLINIC | Facility: CLINIC | Age: 59
End: 2024-08-26

## 2024-08-30 ENCOUNTER — OFFICE VISIT (OUTPATIENT)
Dept: FAMILY MEDICINE CLINIC | Facility: CLINIC | Age: 59
End: 2024-08-30
Payer: COMMERCIAL

## 2024-08-30 VITALS
OXYGEN SATURATION: 99 % | WEIGHT: 193 LBS | TEMPERATURE: 97.5 F | SYSTOLIC BLOOD PRESSURE: 136 MMHG | HEART RATE: 71 BPM | BODY MASS INDEX: 35.51 KG/M2 | HEIGHT: 62 IN | DIASTOLIC BLOOD PRESSURE: 96 MMHG

## 2024-08-30 DIAGNOSIS — K56.609 SBO (SMALL BOWEL OBSTRUCTION) (HCC): Primary | ICD-10-CM

## 2024-08-30 DIAGNOSIS — R03.0 ELEVATED BLOOD PRESSURE READING: ICD-10-CM

## 2024-08-30 PROCEDURE — 99496 TRANSJ CARE MGMT HIGH F2F 7D: CPT | Performed by: FAMILY MEDICINE

## 2024-08-30 NOTE — ASSESSMENT & PLAN NOTE
Repeat BP improved but still elevated above goal; recheck in 3-4 weeks; f/u guidance given as previously controlled

## 2024-08-30 NOTE — PROGRESS NOTES
"Assessment/Plan:     1. SBO (small bowel obstruction) (HCC)  Assessment & Plan:  Exam benign; advised f/u with general surgery regarding medications as they seem to be upsetting her stomach; has f/u scheduled; reviewed diet modifications; f/u guidance given should sx return or worsen pt to go back to ER  2. Elevated blood pressure reading  Assessment & Plan:  Repeat BP improved but still elevated above goal; recheck in 3-4 weeks; f/u guidance given as previously controlled        Subjective:      Patient ID: Linda Velazquez is a 59 y.o. female.    Patient is here for follow up on hospital admission for SBO. Pt admitted 8/21-8/24. Patient was treated conservatively and sx improved for discharge. Did require surgery for SBO when she was 17 years old. Told when she had hysterectomy that she had a lot of adhesions. Pt admits she still feels not quit right. Some bloating of stomach. She states she has not felt great all week and a little swollen today. Last BM was this am.   Patient was put on Miralax daily and colace BID by general surgery but did not take this am. Thinks it may be upsetting her gut. She states stools had been loose and watery. Just drinking water and some coffee. She is eating but appetite is down. Eating chicken and mashed potatoes, very bland diet. No fevers. No vomiting.   Has follow up with surgery on 9/9.              Hospital Course  \"Linda Velazquez was admitted on 8/21/2024 to the General Surgery service with concerns for SBO. Initial imaging demonstrated fluid-filled and dilated small bowel and distended gallbladder. She was treated conservatively with bowel rest and fluid resuscitation. Small bowel follow through demonstrated no abnormalities. Pain was controlled with multimodal analgesia, which was IV and transitioned to PO. By day of discharge, the patient was ambulating, voiding spontaneously, tolerating the appropriate diet, and had pain well-controlled with PO medications. Linda ALVARADO " "Marcus was thus deemed fit for discharge. Appropriate deep vein thrombosis (DVT) prophylaxis was provided. No complications were encountered during her hospital course.     Please see the electronic medical record for further information.\"          The following portions of the patient's history were reviewed and updated as appropriate: allergies, current medications, past family history, past medical history, past social history, past surgical history, and problem list.    Review of Systems   Constitutional:  Negative for chills and fever.   Respiratory:  Negative for shortness of breath.    Cardiovascular:  Negative for chest pain and leg swelling.   Gastrointestinal:  Positive for abdominal distention and diarrhea. Negative for abdominal pain, blood in stool, constipation, nausea and vomiting.   Neurological:  Negative for dizziness and light-headedness.         Objective:      /96   Pulse 71   Temp 97.5 °F (36.4 °C)   Ht 5' 2\" (1.575 m)   Wt 87.5 kg (193 lb)   LMP 05/08/2019   SpO2 99%   BMI 35.30 kg/m²          Physical Exam  Vitals reviewed.   Constitutional:       General: She is not in acute distress.     Appearance: Normal appearance. She is not ill-appearing, toxic-appearing or diaphoretic.   HENT:      Head: Normocephalic and atraumatic.   Eyes:      General: No scleral icterus.        Right eye: No discharge.         Left eye: No discharge.      Conjunctiva/sclera: Conjunctivae normal.   Cardiovascular:      Rate and Rhythm: Normal rate and regular rhythm.      Pulses: Normal pulses.      Heart sounds: Normal heart sounds. No murmur heard.     No gallop.   Pulmonary:      Effort: Pulmonary effort is normal. No respiratory distress.      Breath sounds: Normal breath sounds. No stridor. No wheezing, rhonchi or rales.   Abdominal:      General: Bowel sounds are normal. There is no distension.      Palpations: Abdomen is soft.      Tenderness: There is no guarding or rebound.   Musculoskeletal: "      Right lower leg: No edema.      Left lower leg: No edema.   Neurological:      General: No focal deficit present.      Mental Status: She is alert and oriented to person, place, and time.   Psychiatric:         Mood and Affect: Mood normal.         Behavior: Behavior normal.         Thought Content: Thought content normal.         Judgment: Judgment normal.

## 2024-08-30 NOTE — ASSESSMENT & PLAN NOTE
Exam benign; advised f/u with general surgery regarding medications as they seem to be upsetting her stomach; has f/u scheduled; reviewed diet modifications; f/u guidance given should sx return or worsen pt to go back to ER

## 2024-09-09 DIAGNOSIS — J32.9 CHRONIC SINUSITIS, UNSPECIFIED LOCATION: Primary | ICD-10-CM

## 2024-09-10 RX ORDER — FLUTICASONE PROPIONATE 50 MCG
1 SPRAY, SUSPENSION (ML) NASAL DAILY
Qty: 16 G | Refills: 5 | Status: SHIPPED | OUTPATIENT
Start: 2024-09-10

## 2024-10-09 ENCOUNTER — OFFICE VISIT (OUTPATIENT)
Dept: FAMILY MEDICINE CLINIC | Facility: CLINIC | Age: 59
End: 2024-10-09
Payer: COMMERCIAL

## 2024-10-09 VITALS
SYSTOLIC BLOOD PRESSURE: 122 MMHG | WEIGHT: 196.2 LBS | HEIGHT: 62 IN | HEART RATE: 92 BPM | TEMPERATURE: 97.8 F | DIASTOLIC BLOOD PRESSURE: 80 MMHG | BODY MASS INDEX: 36.1 KG/M2 | OXYGEN SATURATION: 99 %

## 2024-10-09 DIAGNOSIS — Z00.00 ROUTINE ADULT HEALTH MAINTENANCE: Primary | ICD-10-CM

## 2024-10-09 DIAGNOSIS — E55.9 VITAMIN D DEFICIENCY: ICD-10-CM

## 2024-10-09 DIAGNOSIS — F32.A ANXIETY AND DEPRESSION: ICD-10-CM

## 2024-10-09 DIAGNOSIS — Z13.6 SCREENING FOR CARDIOVASCULAR CONDITION: ICD-10-CM

## 2024-10-09 DIAGNOSIS — F41.9 ANXIETY AND DEPRESSION: ICD-10-CM

## 2024-10-09 PROCEDURE — 99396 PREV VISIT EST AGE 40-64: CPT | Performed by: FAMILY MEDICINE

## 2024-10-09 NOTE — PROGRESS NOTES
Adult Annual Physical  Name: Linda Velazquez      : 1965      MRN: [id number]  Encounter Provider: Talia Aguilar DO  Encounter Date: 10/9/2024   Encounter department: [facility]    Assessment & Plan  Routine adult health maintenance  -reviewed diet and exercise recommendations; reviewed recommended vaccines, deferred today but will return for nurse visits; UTD on colonoscopy and mammogram; does not require pap secondary to hysterectomy; advised on living will; doing well since SBO; advised f/u with urology for sediment passing    Vitamin D deficiency  -takes multivitamin; last DEXA was WNL; check levels  Orders:    Vitamin D 25 hydroxy; Future    Anxiety and depression  -stable; c/w current tx    Orders:    CBC and differential; Future    TSH, 3rd generation with Free T4 reflex; Future    Comprehensive metabolic panel; Future    Screening for cardiovascular condition    Orders:    Lipid Panel with Direct LDL reflex; Future    Comprehensive metabolic panel; Future    Immunizations and preventive care screenings were discussed with patient today. Appropriate education was printed on patient's after visit summary.    Counseling:  Dental Health: discussed importance of regular tooth brushing, flossing, and dental visits.  Exercise: the importance of regular exercise/physical activity was discussed. Recommend exercise 3-5 times per week for at least 30 minutes.       Depression Screening and Follow-up Plan: Patient was screened for depression during today's encounter. They screened negative with a PHQ-9 score of 4.        History of Present Illness     Adult Annual Physical:  Patient presents for annual physical. Patient has not had any further abdominal pain since SBO.     Pt is doing well on [id number] mg daily.     Still has occasional passing of sediment through urine but no symptoms. No further fevers. Tx helped. Nothing as large as it was in March. .     Diet and Physical  "Activity:  - Diet/Nutrition: low fat diet. watchign diet to avoid further bowel obstructions; watching what type of vegetables  - Exercise: walking, 5-7 times a week on average and 30-60 minutes on average. limits walking to 30 minutes due to knee    Depression Screening:    - PHQ-9 Score: 4    General Health:  - Sleep: sleeps well. 6-7 hours of sleep  - Hearing: decreased hearing bilateral ears. can't hear certain tones  - Vision: goes for regular eye exams.  - Dental: regular dental visits.    /GYN Health:  - Follows with GYN: yes.   - Menopause: postmenopausal.     Advanced Care Planning:  - Has an advanced directive?: no    - Has a durable medical POA?: no    - ACP document given to patient?: yes      Review of Systems  Medical History Reviewed by provider this encounter:  Tobacco  Allergies  Meds  Problems  Med Hx  Surg Hx  Fam Hx         Objective     /80   Pulse 92   Temp 97.8 °F (36.6 °C)   Ht 5' 2\" (1.575 m)   Wt 89 kg (196 lb 3.2 oz)   LMP 05/08/2019   SpO2 99%   BMI 35.89 kg/m²     Physical Exam  Vitals reviewed.   Constitutional:       General: She is not in acute distress.     Appearance: Normal appearance. She is normal weight. She is not ill-appearing or toxic-appearing.   HENT:      Head: Normocephalic and atraumatic.      Right Ear: Tympanic membrane, ear canal and external ear normal. There is no impacted cerumen.      Left Ear: Tympanic membrane, ear canal and external ear normal. There is no impacted cerumen.      Nose: Nose normal.      Mouth/Throat:      Mouth: Mucous membranes are moist.      Pharynx: No oropharyngeal exudate or posterior oropharyngeal erythema.   Eyes:      General: No scleral icterus.        Left eye: No discharge.      Conjunctiva/sclera: Conjunctivae normal.      Pupils: Pupils are equal, round, and reactive to light.   Neck:      Thyroid: No thyroid mass, thyromegaly or thyroid tenderness.   Cardiovascular:      Rate and Rhythm: Normal rate and " regular rhythm.      Heart sounds: Normal heart sounds. No murmur heard.  Pulmonary:      Effort: Pulmonary effort is normal. No respiratory distress.      Breath sounds: Normal breath sounds. No wheezing, rhonchi or rales.   Abdominal:      General: Abdomen is flat.      Palpations: There is no mass.      Tenderness: There is no abdominal tenderness. There is no guarding.      Hernia: No hernia is present.   Musculoskeletal:         General: No swelling.      Cervical back: Neck supple. No muscular tenderness.   Lymphadenopathy:      Cervical: No cervical adenopathy.   Skin:     Findings: No rash.   Neurological:      Mental Status: She is alert and oriented to person, place, and time.   Psychiatric:         Mood and Affect: Mood normal.         Behavior: Behavior normal.         Thought Content: Thought content normal.         Judgment: Judgment normal.

## 2024-10-09 NOTE — ASSESSMENT & PLAN NOTE
-reviewed diet and exercise recommendations; reviewed recommended vaccines, deferred today but will return for nurse visits; UTD on colonoscopy and mammogram; does not require pap secondary to hysterectomy; advised on living will; doing well since SBO; advised f/u with urology for sediment passing

## 2024-10-09 NOTE — ASSESSMENT & PLAN NOTE
-stable; c/w current tx    Orders:    CBC and differential; Future    TSH, 3rd generation with Free T4 reflex; Future    Comprehensive metabolic panel; Future

## 2024-11-12 ENCOUNTER — IMMUNIZATIONS (OUTPATIENT)
Dept: FAMILY MEDICINE CLINIC | Facility: CLINIC | Age: 59
End: 2024-11-12
Payer: COMMERCIAL

## 2024-11-12 DIAGNOSIS — F32.9 REACTIVE DEPRESSION: ICD-10-CM

## 2024-11-12 DIAGNOSIS — Z23 ENCOUNTER FOR IMMUNIZATION: Primary | ICD-10-CM

## 2024-11-12 PROCEDURE — 90673 RIV3 VACCINE NO PRESERV IM: CPT

## 2024-11-12 PROCEDURE — 90471 IMMUNIZATION ADMIN: CPT

## 2024-11-13 RX ORDER — SERTRALINE HYDROCHLORIDE 25 MG/1
50 TABLET, FILM COATED ORAL DAILY
Qty: 180 TABLET | Refills: 1 | Status: SHIPPED | OUTPATIENT
Start: 2024-11-13

## 2024-12-12 NOTE — CARE ANYWHERE EVISITS
Visit Summary for Ryan Haywood - Gender: Female - Date of Birth: [id number]  Date: [id number] - Duration: 16 minutes  Patient: Ryan Haywood  Provider: Kylee Groves PA-C    Patient Contact Information  Address  [id number] [place] [address]; [place] [id number]      Visit Topics  Fever [Added By: Self - 2023-10-24]  Cold [Added By: Self - 2023-10-24]  Coughing [Added By: Self - 2023-10-24]  Earache [Added By: Self - 2023-10-24]    Triage Questions   What is your current physical address in the event of a medical emergency? Answer []  Are you allergic to any medications? Answer []  Are you now or could you be pregnant? Answer []  Do you have any immune system compromise or chronic lung   disease? Answer []  Do you have any vulnerable family members in the home (infant, pregnant, cancer, elderly)? Answer []     Conversation Transcripts  [0A][0A] [Notification] You are connected with Kylee Groves PA-C, Urgent Care Specialist.[0A][Notification] Ryan Haywood is located in [place]. [0A][Notification] Ryan Haywood has shared health history. Lanny Navarro .[0A]    Diagnosis  Cough  Fever, unspecified    Procedures  Value: [id number] Code: CPT-4 UNLISTED E&M SERVICE    Medications Prescribed    No prescriptions ordered    Electronically signed by: Lesvia Fernandez(NPI [id number]) Writer spoke with Tamar PT. Explained Dr. Rizo does not have any specific protocol, standard protocol would be appropriate. Taamr does have access to other providers' protocols and will progress patient accordingly. Tamar does not patient has been using surgical arm to propel her wheelchair. Will reinforce avoiding this. All questions answered.

## 2025-01-03 ENCOUNTER — ANNUAL EXAM (OUTPATIENT)
Dept: GYNECOLOGY | Facility: CLINIC | Age: 60
End: 2025-01-03
Payer: COMMERCIAL

## 2025-01-03 VITALS
WEIGHT: 197 LBS | HEIGHT: 62 IN | HEART RATE: 89 BPM | DIASTOLIC BLOOD PRESSURE: 74 MMHG | BODY MASS INDEX: 36.25 KG/M2 | SYSTOLIC BLOOD PRESSURE: 124 MMHG

## 2025-01-03 DIAGNOSIS — Z01.419 ENCOUNTER FOR GYNECOLOGICAL EXAMINATION WITHOUT ABNORMAL FINDING: Primary | ICD-10-CM

## 2025-01-03 DIAGNOSIS — N95.2 ATROPHIC VAGINITIS: ICD-10-CM

## 2025-01-03 PROCEDURE — S0612 ANNUAL GYNECOLOGICAL EXAMINA: HCPCS | Performed by: OBSTETRICS & GYNECOLOGY

## 2025-01-03 RX ORDER — ESTRADIOL 0.1 MG/G
CREAM VAGINAL
Qty: 42.5 G | Refills: 3 | Status: SHIPPED | OUTPATIENT
Start: 2025-01-03

## 2025-01-03 NOTE — PROGRESS NOTES
Name: Linda Velazquez      : 1965      MRN: [id number]  Encounter Provider: Eduar Post DO  Encounter Date: 1/3/2025   Encounter department: [facility]  :  Assessment & Plan  Encounter for gynecological examination without abnormal finding         Atrophic vaginitis    Orders:    estradiol (ESTRACE) 0.1 mg/g vaginal cream; 1 g vaginally  for 3 weeks then once weekly        History of Present Illness   HPI  Linda Velazquez is a 59 y.o. female who presents for annual examination.  She is status post [facility] BS with lysis of adhesions in May 2019.  She recent was recently hospitalized for small bowel obstruction in 2024.  Patient denies any vaginal discharge or bleeding.  She is experiencing vaginal dryness and dyspareunia.  Denies any dysuria, hematuria urgency urinary incontinence.  No GI complaints.    DEXA scan 2023.  Normal.    Colonoscopy May 2022.  Polyps identified.  Advised repeat in 5 years.      Review of Systems   Constitutional: Negative.    HENT:  Negative for sore throat and trouble swallowing.    Gastrointestinal: Negative.    Genitourinary:  Positive for dyspareunia.     Past Medical History   Past Medical History:   Diagnosis Date    Abnormal bleeding in menstrual cycle     Adenomyosis     Allergic     Seasonal and mold, dust mites    Allergic reaction     LAST ASSESSED: 14    Allergic rhinitis     LAST ASSESSED: 14    Anxiety     Arthritis 2019    Knee    Burn     Left upper, inner arm--from cooking. Almost healed    Depression     Diffuse cystic mastopathy     Diffuse cystic mastopathy     Endometriosis     Fibroid uterus     Headache(784.0)     HL (hearing loss) 2018    Trouble hearing certain tones    [place] (hard of hearing)     Slightly    HPV (human papilloma virus) infection 10/2018    Irregular heart beat     Irregular menses 2017    Irritable bowel syndrome     Kidney stone 3/28/2023     But had been passing flesh like particles since August 2022.    Migraines     MVP (mitral valve prolapse)     Ovarian cyst 03/2019    Pelvic pain     PONV (postoperative nausea and vomiting)     Snores     Stress incontinence     Occasional     Past Surgical History:   Procedure Laterality Date    ABDOMINAL SURGERY      laparotomy secondary to SBO age 17    APPENDECTOMY      COLONOSCOPY      DILATION AND CURETTAGE OF UTERUS      EGD      ENDOMETRIAL ABLATION      HYSTERECTOMY  05/13/2019    HYSTEROSCOPY  2012    I had 2 completed in 2012    KNEE ARTHROSCOPY Right     NASAL SEPTUM SURGERY      PA LAPS TOTAL HYSTERECT 250 GM/< W/RMVL TUBE/OVARY N/A 05/13/2019    Procedure: LAP TOTAL HYSTERECTOMY, B/L SALPINGECTOMY, EXTENSIVE ADHESIOLYSIS, CYSTOSCOPY;  Surgeon: Eduar Post DO;  Location: [facility];  Service: Gynecology    SMALL INTESTINE SURGERY  1982    Small Bowel Obstruction    WISDOM TOOTH EXTRACTION       Family History   Problem Relation Age of Onset    Valvular heart disease Mother     Heart murmur Mother     Anxiety disorder Mother     Migraines Mother     Arthritis Mother     Heart attack Maternal Grandmother     Heart failure Maternal Grandmother     Hypertension Paternal Grandfather     Cancer Maternal Grandfather         Prostate Cancer    No Known Problems Daughter       reports that she quit smoking about 29 years ago. Her smoking use included cigarettes. She started smoking about 40 years ago. She has a 2.5 pack-year smoking history. She has never used smokeless tobacco. She reports current alcohol use of about 2.0 standard drinks of alcohol per week. She reports that she does not use drugs.  Current Outpatient Medications on File Prior to Visit   Medication Sig Dispense Refill    EPINEPHrine (EPIPEN) 0.3 mg/0.3 mL SOAJ Inject 0.3 mL (0.3 mg total) into a muscle once for 1 dose 2 each 1    fluconazole (DIFLUCAN) 150 mg tablet Take 150 mg by mouth once (Patient not taking: Reported on 10/9/2024)       fluticasone (FLONASE) 50 mcg/act nasal spray USE 1 SPRAY IN EACH NOSTRIL DAILY 16 g 5    LORazepam (ATIVAN) 0.5 mg tablet Take 1 tablet (0.5 mg total) by mouth as needed (prn) 30 tablet 0    ondansetron (ZOFRAN-ODT) 4 mg disintegrating tablet Take 1 tablet (4 mg total) by mouth every 8 (eight) hours as needed for nausea or vomiting 15 tablet 0    sertraline (ZOLOFT) 25 mg tablet TAKE 2 TABLETS DAILY 180 tablet 1    SUMAtriptan (IMITREX) 100 mg tablet Take 1 tablet (100 mg total) by mouth every 2 (two) hours as needed for migraine May repeat in 2 hours if needed 18 tablet 0     No current facility-administered medications on file prior to visit.     Allergies   Allergen Reactions    Bee Venom Anaphylaxis    Eggs Or Egg-Derived Products - Food Allergy Anaphylaxis     Egg whites- tested at allergist    Fluzone [Influenza Virus Vaccine] Anaphylaxis, Shortness Of Breath, Diarrhea and Throat Swelling     10/18/18 given at employer    Iodine - Food Allergy Tachycardia     IV contrast dye caused tachycardia    Shellfish-Derived Products - Food Allergy Diarrhea and Vomiting    Shrimp Extract Allergy Skin Test - Food Allergy Vomiting    Ciprofloxacin Rash      Current Outpatient Medications on File Prior to Visit   Medication Sig Dispense Refill    EPINEPHrine (EPIPEN) 0.3 mg/0.3 mL SOAJ Inject 0.3 mL (0.3 mg total) into a muscle once for 1 dose 2 each 1    fluconazole (DIFLUCAN) 150 mg tablet Take 150 mg by mouth once (Patient not taking: Reported on 10/9/2024)      fluticasone (FLONASE) 50 mcg/act nasal spray USE 1 SPRAY IN EACH NOSTRIL DAILY 16 g 5    LORazepam (ATIVAN) 0.5 mg tablet Take 1 tablet (0.5 mg total) by mouth as needed (prn) 30 tablet 0    ondansetron (ZOFRAN-ODT) 4 mg disintegrating tablet Take 1 tablet (4 mg total) by mouth every 8 (eight) hours as needed for nausea or vomiting 15 tablet 0    sertraline (ZOLOFT) 25 mg tablet TAKE 2 TABLETS DAILY 180 tablet 1    SUMAtriptan (IMITREX) 100 mg tablet Take 1  "tablet (100 mg total) by mouth every 2 (two) hours as needed for migraine May repeat in 2 hours if needed 18 tablet 0     No current facility-administered medications on file prior to visit.      Social History     Tobacco Use    Smoking status: Former     Current packs/day: 0.00     Average packs/day: 0.3 packs/day for 10.0 years (2.5 ttl pk-yrs)     Types: Cigarettes     Start date:      Quit date: 1995     Years since quittin.3    Smokeless tobacco: Never    Tobacco comments:     light smoker / 23 yrs ago   Vaping Use    Vaping status: Never Used   Substance and Sexual Activity    Alcohol use: Yes     Alcohol/week: 2.0 standard drinks of alcohol     Types: 2 Glasses of wine per week     Comment: social    Drug use: No    Sexual activity: Not Currently     Partners: Male     Birth control/protection: Post-menopausal, None        Objective   /74   Pulse 89   Ht 5' 2\" (1.575 m)   Wt 89.4 kg (197 lb)   LMP 2019   BMI 36.03 kg/m²      Physical Exam  Vitals reviewed.   Cardiovascular:      Rate and Rhythm: Normal rate and regular rhythm.      Pulses: Normal pulses.      Heart sounds: Normal heart sounds. No murmur heard.  Pulmonary:      Effort: Pulmonary effort is normal. No respiratory distress.      Breath sounds: Normal breath sounds.   Chest:   Breasts:     Right: No swelling, bleeding, inverted nipple, mass, nipple discharge, skin change or tenderness.      Left: No swelling, bleeding, inverted nipple, mass, nipple discharge, skin change or tenderness.   Abdominal:      General: There is no distension.      Palpations: Abdomen is soft. There is no mass.      Tenderness: There is no abdominal tenderness. There is no guarding or rebound.      Hernia: No hernia is present. There is no hernia in the left inguinal area or right inguinal area.   Genitourinary:     General: Normal vulva.      Labia:         Right: No rash, tenderness or lesion.         Left: No rash, tenderness or lesion.  "      Vagina: Normal.      Uterus: Absent.       Adnexa:         Right: No mass, tenderness or fullness.          Left: No mass, tenderness or fullness.     Musculoskeletal:      Cervical back: Normal range of motion and neck supple. No tenderness.   Lymphadenopathy:      Cervical: No cervical adenopathy.      Upper Body:      Right upper body: No supraclavicular, axillary or pectoral adenopathy.      Left upper body: No supraclavicular, axillary or pectoral adenopathy.      Lower Body: No right inguinal adenopathy. No left inguinal adenopathy.   Neurological:      Mental Status: She is alert.

## 2025-01-04 ENCOUNTER — APPOINTMENT (OUTPATIENT)
Dept: LAB | Facility: CLINIC | Age: 60
End: 2025-01-04
Payer: COMMERCIAL

## 2025-01-04 DIAGNOSIS — Z13.6 SCREENING FOR CARDIOVASCULAR CONDITION: ICD-10-CM

## 2025-01-04 DIAGNOSIS — F41.9 ANXIETY AND DEPRESSION: ICD-10-CM

## 2025-01-04 DIAGNOSIS — F32.A ANXIETY AND DEPRESSION: ICD-10-CM

## 2025-01-04 DIAGNOSIS — E55.9 VITAMIN D DEFICIENCY: ICD-10-CM

## 2025-01-04 LAB
25(OH)D3 SERPL-MCNC: 21.8 NG/ML (ref 30–100)
ALBUMIN SERPL BCG-MCNC: 4.1 G/DL (ref 3.5–5)
ALP SERPL-CCNC: 55 U/L (ref 34–104)
ALT SERPL W P-5'-P-CCNC: 19 U/L (ref 7–52)
ANION GAP SERPL CALCULATED.3IONS-SCNC: 7 MMOL/L (ref 4–13)
AST SERPL W P-5'-P-CCNC: 17 U/L (ref 13–39)
BASOPHILS # BLD AUTO: 0.03 THOUSANDS/ΜL (ref 0–0.1)
BASOPHILS NFR BLD AUTO: 1 % (ref 0–1)
BILIRUB SERPL-MCNC: 0.91 MG/DL (ref 0.2–1)
BUN SERPL-MCNC: 20 MG/DL (ref 5–25)
CALCIUM SERPL-MCNC: 9.2 MG/DL (ref 8.4–10.2)
CHLORIDE SERPL-SCNC: 103 MMOL/L (ref 96–108)
CHOLEST SERPL-MCNC: 207 MG/DL (ref ?–200)
CO2 SERPL-SCNC: 27 MMOL/L (ref 21–32)
CREAT SERPL-MCNC: 0.65 MG/DL (ref 0.6–1.3)
EOSINOPHIL # BLD AUTO: 0.09 THOUSAND/ΜL (ref 0–0.61)
EOSINOPHIL NFR BLD AUTO: 2 % (ref 0–6)
ERYTHROCYTE [DISTWIDTH] IN BLOOD BY AUTOMATED COUNT: 14.1 % (ref 11.6–15.1)
GFR SERPL CREATININE-BSD FRML MDRD: 96 ML/MIN/1.73SQ M
GLUCOSE P FAST SERPL-MCNC: 93 MG/DL (ref 65–99)
HCT VFR BLD AUTO: 39.7 % (ref 34.8–46.1)
HDLC SERPL-MCNC: 86 MG/DL
HGB BLD-MCNC: 13.2 G/DL (ref 11.5–15.4)
IMM GRANULOCYTES # BLD AUTO: 0.01 THOUSAND/UL (ref 0–0.2)
IMM GRANULOCYTES NFR BLD AUTO: 0 % (ref 0–2)
LDLC SERPL CALC-MCNC: 107 MG/DL (ref 0–100)
LYMPHOCYTES # BLD AUTO: 1.59 THOUSANDS/ΜL (ref 0.6–4.47)
LYMPHOCYTES NFR BLD AUTO: 30 % (ref 14–44)
MCH RBC QN AUTO: 29.7 PG (ref 26.8–34.3)
MCHC RBC AUTO-ENTMCNC: 33.2 G/DL (ref 31.4–37.4)
MCV RBC AUTO: 89 FL (ref 82–98)
MONOCYTES # BLD AUTO: 0.48 THOUSAND/ΜL (ref 0.17–1.22)
MONOCYTES NFR BLD AUTO: 9 % (ref 4–12)
NEUTROPHILS # BLD AUTO: 3.09 THOUSANDS/ΜL (ref 1.85–7.62)
NEUTS SEG NFR BLD AUTO: 58 % (ref 43–75)
NRBC BLD AUTO-RTO: 0 /100 WBCS
PLATELET # BLD AUTO: 277 THOUSANDS/UL (ref 149–390)
PMV BLD AUTO: 11.9 FL (ref 8.9–12.7)
POTASSIUM SERPL-SCNC: 4 MMOL/L (ref 3.5–5.3)
PROT SERPL-MCNC: 7 G/DL (ref 6.4–8.4)
RBC # BLD AUTO: 4.44 MILLION/UL (ref 3.81–5.12)
SODIUM SERPL-SCNC: 137 MMOL/L (ref 135–147)
TRIGL SERPL-MCNC: 72 MG/DL (ref ?–150)
TSH SERPL DL<=0.05 MIU/L-ACNC: 1.65 UIU/ML (ref 0.45–4.5)
WBC # BLD AUTO: 5.29 THOUSAND/UL (ref 4.31–10.16)

## 2025-01-04 PROCEDURE — 80061 LIPID PANEL: CPT

## 2025-01-04 PROCEDURE — 85025 COMPLETE CBC W/AUTO DIFF WBC: CPT

## 2025-01-04 PROCEDURE — 82306 VITAMIN D 25 HYDROXY: CPT

## 2025-01-04 PROCEDURE — 80053 COMPREHEN METABOLIC PANEL: CPT

## 2025-01-04 PROCEDURE — 84443 ASSAY THYROID STIM HORMONE: CPT

## 2025-01-04 PROCEDURE — 36415 COLL VENOUS BLD VENIPUNCTURE: CPT

## 2025-01-23 ENCOUNTER — TELEPHONE (OUTPATIENT)
Dept: FAMILY MEDICINE CLINIC | Facility: CLINIC | Age: 60
End: 2025-01-23

## 2025-01-27 ENCOUNTER — RESULTS FOLLOW-UP (OUTPATIENT)
Dept: FAMILY MEDICINE CLINIC | Facility: CLINIC | Age: 60
End: 2025-01-27

## 2025-05-12 DIAGNOSIS — F32.9 REACTIVE DEPRESSION: ICD-10-CM

## 2025-05-12 RX ORDER — SERTRALINE HYDROCHLORIDE 25 MG/1
50 TABLET, FILM COATED ORAL DAILY
Qty: 180 TABLET | Refills: 3 | Status: SHIPPED | OUTPATIENT
Start: 2025-05-12

## 2025-05-27 ENCOUNTER — OFFICE VISIT (OUTPATIENT)
Dept: FAMILY MEDICINE CLINIC | Facility: CLINIC | Age: 60
End: 2025-05-27
Payer: COMMERCIAL

## 2025-05-27 VITALS
OXYGEN SATURATION: 99 % | HEART RATE: 79 BPM | SYSTOLIC BLOOD PRESSURE: 118 MMHG | WEIGHT: 203 LBS | DIASTOLIC BLOOD PRESSURE: 86 MMHG | BODY MASS INDEX: 37.13 KG/M2 | TEMPERATURE: 98.3 F

## 2025-05-27 DIAGNOSIS — K56.609 SBO (SMALL BOWEL OBSTRUCTION) (HCC): ICD-10-CM

## 2025-05-27 DIAGNOSIS — F41.9 ANXIETY: ICD-10-CM

## 2025-05-27 DIAGNOSIS — E66.812 OBESITY, CLASS II, BMI 35-39.9: ICD-10-CM

## 2025-05-27 DIAGNOSIS — G43.009 MIGRAINE WITHOUT AURA AND WITHOUT STATUS MIGRAINOSUS, NOT INTRACTABLE: Primary | ICD-10-CM

## 2025-05-27 PROCEDURE — 99214 OFFICE O/P EST MOD 30 MIN: CPT | Performed by: FAMILY MEDICINE

## 2025-05-27 RX ORDER — LEVOCETIRIZINE DIHYDROCHLORIDE 5 MG/1
2.5 TABLET, FILM COATED ORAL EVERY EVENING
COMMUNITY

## 2025-05-27 NOTE — PROGRESS NOTES
Name: Linda Velazquez      : 1965      MRN: [id number]  Encounter Provider: Eduar Azar DO  Encounter Date: 2025   Encounter department: [facility]    Assessment & Plan  Migraine without aura and without status migrainosus, not intractable  Migraines fairly uncommon for her at this point with good response to sumatriptan when she does have a headache       SBO (small bowel obstruction) (HCC)  History of 2 small bowel obstructions in her life 1 at age 17 1 at age 59.  Moderates her diet to avoid complications       Anxiety  Well-controlled on sertraline 50 mg daily       Obesity, Class II, BMI 35-39.9  Prior Authorization Clinical Questions for Weight Management Pharmacotherapy    1. Does the patient have a contrainidcation to medication prescribed for weight management?: No  2. Does the patient have a diagnosis of obesity, confirmed by a BMI greater than or equal to 30 kg/m^2?: Yes  3. Does the patient have a BMI of greater than or equal to 27 kg/m^2 with at least one weight-related comorbidity/risk factor/complication (e.g. diabetes, dyslipidemia, coronary artery disease)?: Yes  4. Weight-related co-morbidities/risk factors: dyslipidemia  6. ZEPBOUND GEMA Indication: Does patient have documented GEMA diagnosed via sleep study (insurance will require copy of sleep study results for approval)?: No  7. Has the patient been on a weight loss regimen of low-calorie diet, increased physical activity, and lifestyle modifications for a minimum of 6 months?: Yes  8. Has the patient completed a comprehensive weight loss program (ie, Weight Watchers, Noom, Bariatrics, other david on phone)? If so, what?: No  9. Does the patient have a history of type 2 diabetes?: No  10. Has the member tried and failed other weight loss medication within the past 12 months?: No  11. Will the member use requested medication in combination with another GLP agonist or weight loss drug?: No  12. Is the  medication a controlled substance?: No     Baseline weight (in pounds): 203 lbs  Current weight (in pounds): 203 lbs  Weight loss percentage: 0%     Discussed GLP-1 prescription for weight loss.  Due to patient's history of 2 prior bowel obstructions and multiple abdominal surgeries, will review potential contraindications before prescribing medication.              History of Present Illness     Patient presents for follow-up of chronic medical problems.  She has a history of migraine headaches, anxiety and depression and hyperlipidemia.  She is also concerned about her inability to lose weight.      Review of Systems   Constitutional: Negative.    Respiratory: Negative.     Cardiovascular: Negative.    Gastrointestinal: Negative.    Genitourinary: Negative.    Musculoskeletal: Negative.    Psychiatric/Behavioral: Negative.       Past Medical History[1]  Past Surgical History[2]  Family History[3]  Social History[4]  Medications[5]  Allergies   Allergen Reactions   • Bee Venom Anaphylaxis   • Eggs Or Egg-Derived Products - Food Allergy Anaphylaxis     Egg whites- tested at allergist   • Fluzone [Influenza Virus Vaccine] Anaphylaxis, Shortness Of Breath, Diarrhea and Throat Swelling     10/18/18 given at employer   • Iodine - Food Allergy Tachycardia     IV contrast dye caused tachycardia   • Shellfish-Derived Products - Food Allergy Diarrhea and Vomiting   • Shrimp Extract Allergy Skin Test - Food Allergy Vomiting   • Ciprofloxacin Rash     Immunization History   Administered Date(s) Administered   • COVID-19 MODERNA VACC 0.5 ML IM 04/22/2021, 05/27/2021   • INFLUENZA 01/01/2007, 11/01/2015, 10/18/2018, 10/23/2020, 10/30/2020, 11/04/2021   • Influenza Quadrivalent, 6-35 Months IM 11/01/2015   • Influenza Recombinant Preservative Free Im 11/12/2024   • Influenza, recombinant, quadrivalent,injectable, preservative free 11/04/2021   • Influenza, seasonal, injectable 11/01/2016   • Tdap 07/02/2008, 08/07/2020   •  Zoster Vaccine Recombinant 09/09/2021, 01/24/2022     Objective   /86 (BP Location: Left arm, Patient Position: Sitting, Cuff Size: Adult)   Pulse 79   Temp 98.3 °F (36.8 °C)   Wt 92.1 kg (203 lb)   LMP 05/08/2019   SpO2 99%   BMI 37.13 kg/m²     Physical Exam  Vitals and nursing note reviewed.   Constitutional:       General: She is not in acute distress.     Appearance: Normal appearance.   HENT:      Head: Normocephalic and atraumatic.     Eyes:      Pupils: Pupils are equal, round, and reactive to light.       Cardiovascular:      Rate and Rhythm: Normal rate and regular rhythm.      Pulses: Normal pulses.      Heart sounds: Normal heart sounds.   Pulmonary:      Effort: Pulmonary effort is normal.      Breath sounds: Normal breath sounds.     Musculoskeletal:         General: Normal range of motion.      Cervical back: Normal range of motion.     Skin:     General: Skin is warm and dry.     Neurological:      General: No focal deficit present.      Mental Status: She is alert and oriented to person, place, and time. Mental status is at baseline.     Psychiatric:         Mood and Affect: Mood normal.         Behavior: Behavior normal.         Thought Content: Thought content normal.         Judgment: Judgment normal.                [1]  Past Medical History:  Diagnosis Date   • Abnormal bleeding in menstrual cycle    • Adenomyosis    • Allergic 1998    Seasonal and mold, dust mites   • Allergic reaction     LAST ASSESSED: 11/20/14   • Allergic rhinitis     LAST ASSESSED: 11/20/14   • Anxiety    • Arthritis 2019    Knee   • Burn     Left upper, inner arm--from cooking. Almost healed   • Depression    • Diffuse cystic mastopathy    • Diffuse cystic mastopathy    • Endometriosis    • Fibroid uterus    • Headache(784.0) 2006   • HL (hearing loss) 2018    Trouble hearing certain tones   • [place] (hard of hearing)     Slightly   • HPV (human papilloma virus) infection 10/2018   • Irregular heart beat    •  Irregular menses    • Irritable bowel syndrome    • Kidney stone 3/28/2023    But had been passing flesh like particles since 2022.   • Migraines    • MVP (mitral valve prolapse)    • Ovarian cyst 2019   • Pelvic pain    • PONV (postoperative nausea and vomiting)    • Snores    • Stress incontinence     Occasional   [2]  Past Surgical History:  Procedure Laterality Date   • ABDOMINAL SURGERY      laparotomy secondary to SBO age 17   • APPENDECTOMY     • COLONOSCOPY     • DILATION AND CURETTAGE OF UTERUS     • EGD     • ENDOMETRIAL ABLATION     • HYSTERECTOMY  2019   • HYSTEROSCOPY      I had 2 completed in    • KNEE ARTHROSCOPY Right    • NASAL SEPTUM SURGERY     • CA LAPS TOTAL HYSTERECT 250 GM/< W/RMVL TUBE/OVARY N/A 2019    Procedure: LAP TOTAL HYSTERECTOMY, B/L SALPINGECTOMY, EXTENSIVE ADHESIOLYSIS, CYSTOSCOPY;  Surgeon: Eduar Post DO;  Location: [facility];  Service: Gynecology   • SMALL INTESTINE SURGERY  1982    Small Bowel Obstruction   • WISDOM TOOTH EXTRACTION     [3]  Family History  Problem Relation Name Age of Onset   • Valvular heart disease Mother Eli Rios    • Heart murmur Mother Eli Rios    • Anxiety disorder Mother Eli Rios    • Migraines Mother Eli Rios    • Arthritis Mother Eli Rios    • Heart attack Maternal Grandmother Cookie Nava    • Heart failure Maternal Grandmother Cookie Nava    • Hypertension Paternal Grandfather Dong Carter    • Cancer Maternal Grandfather Tip Nava         Prostate Cancer   • No Known Problems Daughter     [4]  Social History  Tobacco Use   • Smoking status: Former     Current packs/day: 0.00     Average packs/day: 0.3 packs/day for 10.0 years (2.5 ttl pk-yrs)     Types: Cigarettes     Start date:      Quit date: 1995     Years since quittin.7   • Smokeless tobacco: Never   • Tobacco comments:     light smoker / 23 yrs ago   Vaping Use   • Vaping status: Never Used   Substance and Sexual Activity    • Alcohol use: Yes     Alcohol/week: 2.0 standard drinks of alcohol     Types: 2 Glasses of wine per week     Comment: social   • Drug use: No   • Sexual activity: Not Currently     Partners: Male     Birth control/protection: Post-menopausal, None   [5]  Current Outpatient Medications on File Prior to Visit   Medication Sig   • EPINEPHrine (EPIPEN) 0.3 mg/0.3 mL SOAJ Inject 0.3 mL (0.3 mg total) into a muscle once for 1 dose   • estradiol (ESTRACE) 0.1 mg/g vaginal cream 1 g vaginally Monday Wednesday Friday for 3 weeks then once weekly   • fluconazole (DIFLUCAN) 150 mg tablet Take 150 mg by mouth once (Patient not taking: Reported on 10/9/2024)   • fluticasone (FLONASE) 50 mcg/act nasal spray USE 1 SPRAY IN EACH NOSTRIL DAILY   • levocetirizine (Xyzal Allergy 24HR) 5 MG tablet Take 2.5 mg by mouth every evening   • LORazepam (ATIVAN) 0.5 mg tablet Take 1 tablet (0.5 mg total) by mouth as needed (prn)   • ondansetron (ZOFRAN-ODT) 4 mg disintegrating tablet Take 1 tablet (4 mg total) by mouth every 8 (eight) hours as needed for nausea or vomiting   • sertraline (ZOLOFT) 25 mg tablet TAKE 2 TABLETS DAILY   • SUMAtriptan (IMITREX) 100 mg tablet Take 1 tablet (100 mg total) by mouth every 2 (two) hours as needed for migraine May repeat in 2 hours if needed

## 2025-05-27 NOTE — ASSESSMENT & PLAN NOTE
Migraines fairly uncommon for her at this point with good response to sumatriptan when she does have a headache

## 2025-05-27 NOTE — ASSESSMENT & PLAN NOTE
Prior Authorization Clinical Questions for Weight Management Pharmacotherapy    1. Does the patient have a contrainidcation to medication prescribed for weight management?: No  2. Does the patient have a diagnosis of obesity, confirmed by a BMI greater than or equal to 30 kg/m^2?: Yes  3. Does the patient have a BMI of greater than or equal to 27 kg/m^2 with at least one weight-related comorbidity/risk factor/complication (e.g. diabetes, dyslipidemia, coronary artery disease)?: Yes  4. Weight-related co-morbidities/risk factors: dyslipidemia  6. ZEPBOUND GEMA Indication: Does patient have documented GEMA diagnosed via sleep study (insurance will require copy of sleep study results for approval)?: No  7. Has the patient been on a weight loss regimen of low-calorie diet, increased physical activity, and lifestyle modifications for a minimum of 6 months?: Yes  8. Has the patient completed a comprehensive weight loss program (ie, Weight Watchers, Noom, Bariatrics, other david on phone)? If so, what?: No  9. Does the patient have a history of type 2 diabetes?: No  10. Has the member tried and failed other weight loss medication within the past 12 months?: No  11. Will the member use requested medication in combination with another GLP agonist or weight loss drug?: No  12. Is the medication a controlled substance?: No     Baseline weight (in pounds): 203 lbs  Current weight (in pounds): 203 lbs  Weight loss percentage: 0%     Discussed GLP-1 prescription for weight loss.  Due to patient's history of 2 prior bowel obstructions and multiple abdominal surgeries, will review potential contraindications before prescribing medication.

## 2025-05-27 NOTE — ASSESSMENT & PLAN NOTE
History of 2 small bowel obstructions in her life 1 at age 17 1 at age 59.  Moderates her diet to avoid complications

## 2025-05-30 ENCOUNTER — TELEPHONE (OUTPATIENT)
Age: 60
End: 2025-05-30

## 2025-05-30 DIAGNOSIS — E66.01 CLASS 2 SEVERE OBESITY DUE TO EXCESS CALORIES WITH SERIOUS COMORBIDITY AND BODY MASS INDEX (BMI) OF 37.0 TO 37.9 IN ADULT (HCC): Primary | ICD-10-CM

## 2025-05-30 DIAGNOSIS — E66.812 CLASS 2 SEVERE OBESITY DUE TO EXCESS CALORIES WITH SERIOUS COMORBIDITY AND BODY MASS INDEX (BMI) OF 37.0 TO 37.9 IN ADULT (HCC): Primary | ICD-10-CM

## 2025-05-30 RX ORDER — TIRZEPATIDE 2.5 MG/.5ML
2.5 INJECTION, SOLUTION SUBCUTANEOUS WEEKLY
Qty: 2 ML | Refills: 0 | Status: SHIPPED | OUTPATIENT
Start: 2025-05-30 | End: 2025-06-27

## 2025-05-30 NOTE — TELEPHONE ENCOUNTER
PA for Zepbound 2.5mg/0.5mL SUBMITTED to [employer]    via    []CMM-KEY:   [x][employer]-Case ID # [id number]   []Availity-Auth ID # NDC #   []Faxed to plan   []Other website   []Phone call Case ID #     [x]PA sent as URGENT    All office notes, labs and other pertaining documents and studies sent. Clinical questions answered. Awaiting determination from insurance company.     Turnaround time for your insurance to make a decision on your Prior Authorization can take 7-21 business days.

## 2025-05-30 NOTE — TELEPHONE ENCOUNTER
PA for Zepbound 2.5mg/0.5mL APPROVED     Date(s) approved 04/30/2025-01/25/2026    Case #[id number]     Patient advised by          []MyChart Message  [x]Phone call   []LMOM  []L/M to call office as no active Communication consent on file  []Unable to leave detailed message as VM not approved on Communication consent       Pharmacy advised by    [x]Fax  []Phone call  []Secure Chat     Approval letter scanned into Media No Waiting for letter

## 2025-07-31 ENCOUNTER — PATIENT MESSAGE (OUTPATIENT)
Dept: FAMILY MEDICINE CLINIC | Facility: CLINIC | Age: 60
End: 2025-07-31

## 2025-08-22 DIAGNOSIS — E66.01 CLASS 2 SEVERE OBESITY DUE TO EXCESS CALORIES WITH SERIOUS COMORBIDITY AND BODY MASS INDEX (BMI) OF 37.0 TO 37.9 IN ADULT (HCC): Primary | ICD-10-CM

## 2025-08-22 DIAGNOSIS — E66.812 CLASS 2 SEVERE OBESITY DUE TO EXCESS CALORIES WITH SERIOUS COMORBIDITY AND BODY MASS INDEX (BMI) OF 37.0 TO 37.9 IN ADULT (HCC): Primary | ICD-10-CM

## 2025-08-22 RX ORDER — TIRZEPATIDE 5 MG/.5ML
5 INJECTION, SOLUTION SUBCUTANEOUS WEEKLY
Qty: 2 ML | Refills: 0 | Status: SHIPPED | OUTPATIENT
Start: 2025-08-22

## (undated) DEVICE — SCD SEQUENTIAL COMPRESSION COMFORT SLEEVE MEDIUM KNEE LENGTH: Brand: KENDALL SCD

## (undated) DEVICE — TROCAR: Brand: KII FIOS FIRST ENTRY

## (undated) DEVICE — TROCAR: Brand: KII SLEEVE

## (undated) DEVICE — CHLORAPREP HI-LITE 26ML ORANGE

## (undated) DEVICE — UTERINE MANIPULATOR RUMI 6.7 X 6 CM

## (undated) DEVICE — INSUFFLATION NEEDLE TO ESTABLISH PNEUMOPERITONEUM.: Brand: INSUFFLATION NEEDLE

## (undated) DEVICE — [HIGH FLOW INSUFFLATOR,  DO NOT USE IF PACKAGE IS DAMAGED,  KEEP DRY,  KEEP AWAY FROM SUNLIGHT,  PROTECT FROM HEAT AND RADIOACTIVE SOURCES.]: Brand: PNEUMOSURE

## (undated) DEVICE — GLOVE INDICATOR PI UNDERGLOVE SZ 8 BLUE

## (undated) DEVICE — SYRINGE 50ML LL

## (undated) DEVICE — PENCIL ELECTROSURG E-Z CLEAN -0035H

## (undated) DEVICE — MAYO STAND COVER: Brand: CONVERTORS

## (undated) DEVICE — SUT STRATAFIX SPIRAL 2-0 CT-1 30 CM SXPP1B410

## (undated) DEVICE — LIGASURE LAP SLR/DIV MARYLAND 5MM

## (undated) DEVICE — 3000CC GUARDIAN II: Brand: GUARDIAN

## (undated) DEVICE — DRAPE SURGIKIT SADDLE BAG LAP

## (undated) DEVICE — GLOVE PI ULTRA TOUCH SZ.7.5

## (undated) DEVICE — BETHLEHEM UNIVERSAL GYN LAP PK: Brand: CARDINAL HEALTH

## (undated) DEVICE — BLUE HEAT SCOPE WARMER

## (undated) DEVICE — IV EXTENSION TUBING 33 IN

## (undated) DEVICE — INTENDED FOR TISSUE SEPARATION, AND OTHER PROCEDURES THAT REQUIRE A SHARP SURGICAL BLADE TO PUNCTURE OR CUT.: Brand: BARD-PARKER SAFETY BLADES SIZE 11, STERILE

## (undated) DEVICE — OCCLUDER COLPO-PNEUMO

## (undated) DEVICE — ELECTRODE LAP J HOOK E-Z CLEAN 33CM-0021

## (undated) DEVICE — SUT PLAIN 3-0 PS-1 27 IN 1640H

## (undated) DEVICE — PREMIUM DRY TRAY LF: Brand: MEDLINE INDUSTRIES, INC.

## (undated) DEVICE — TRAY FOLEY 16FR URIMETER SILICONE SURESTEP

## (undated) DEVICE — LAPAROSCOPIC SMOKE EVAC TUBING

## (undated) DEVICE — PLASTIC ADHESIVE BANDAGE: Brand: CURITY

## (undated) DEVICE — SUT VICRYL 0 CT-1 36 IN J946H

## (undated) DEVICE — IRRIG ENDO FLO TUBING